# Patient Record
Sex: FEMALE | Race: WHITE | NOT HISPANIC OR LATINO | Employment: OTHER | URBAN - METROPOLITAN AREA
[De-identification: names, ages, dates, MRNs, and addresses within clinical notes are randomized per-mention and may not be internally consistent; named-entity substitution may affect disease eponyms.]

---

## 2021-07-29 RX ORDER — HYDROCHLOROTHIAZIDE 12.5 MG/1
12.5 CAPSULE ORAL DAILY
COMMUNITY
End: 2024-06-17 | Stop reason: HOSPADM

## 2021-07-29 RX ORDER — IBUPROFEN 200 MG
1 CAPSULE ORAL 2 TIMES DAILY
COMMUNITY
End: 2021-07-30 | Stop reason: ALTCHOICE

## 2021-07-29 RX ORDER — ATENOLOL 25 MG/1
12.5 TABLET ORAL DAILY
COMMUNITY
End: 2023-07-14 | Stop reason: ALTCHOICE

## 2021-07-29 RX ORDER — OMEGA-3-ACID ETHYL ESTERS 1 G/1
2 CAPSULE, LIQUID FILLED ORAL 2 TIMES DAILY
COMMUNITY
End: 2024-06-12 | Stop reason: ALTCHOICE

## 2021-07-29 RX ORDER — EZETIMIBE 10 MG/1
10 TABLET ORAL DAILY
COMMUNITY
End: 2023-07-14 | Stop reason: ALTCHOICE

## 2021-07-30 ENCOUNTER — OFFICE VISIT (OUTPATIENT)
Dept: CARDIOLOGY | Facility: CLINIC | Age: 61
End: 2021-07-30
Payer: OTHER GOVERNMENT

## 2021-07-30 ENCOUNTER — ANCILLARY PROCEDURE (OUTPATIENT)
Dept: CARDIOLOGY | Facility: CLINIC | Age: 61
End: 2021-07-30
Payer: OTHER GOVERNMENT

## 2021-07-30 VITALS
WEIGHT: 220 LBS | SYSTOLIC BLOOD PRESSURE: 140 MMHG | DIASTOLIC BLOOD PRESSURE: 86 MMHG | HEIGHT: 66 IN | BODY MASS INDEX: 35.36 KG/M2

## 2021-07-30 VITALS
DIASTOLIC BLOOD PRESSURE: 80 MMHG | WEIGHT: 223.5 LBS | HEIGHT: 66 IN | RESPIRATION RATE: 18 BRPM | HEART RATE: 46 BPM | OXYGEN SATURATION: 96 % | BODY MASS INDEX: 35.92 KG/M2 | SYSTOLIC BLOOD PRESSURE: 148 MMHG

## 2021-07-30 DIAGNOSIS — E78.00 PURE HYPERCHOLESTEROLEMIA: Primary | ICD-10-CM

## 2021-07-30 DIAGNOSIS — Q23.81 BICUSPID AORTIC VALVE: ICD-10-CM

## 2021-07-30 DIAGNOSIS — G47.33 OBSTRUCTIVE SLEEP APNEA SYNDROME: ICD-10-CM

## 2021-07-30 DIAGNOSIS — I10 ESSENTIAL HYPERTENSION: ICD-10-CM

## 2021-07-30 DIAGNOSIS — I35.0 NONRHEUMATIC AORTIC VALVE STENOSIS: ICD-10-CM

## 2021-07-30 LAB
AORTIC ARCH: 3.3 CM
ASCENDING AORTA: 3.96 CM
AV LVOT PEAK GRADIENT: 3.7 MMHG
AV MEAN GRADIENT: 10.25 MMHG
AV PEAK GRADIENT: 17.31 MMHG
BSA FOR ECHO PROCEDURE: 2.16 M2
DOP CALC AO PEAK VEL: 2.08 M/S
DOP CALC AO VTI: 51.3 CM
DOP CALC LVOT DIAMETER: 2.33 CM
DOP CALC LVOT STROKE VOLUME: 107 CM3
DOP CALC MV VTI: 37.54 CM
DOP CALCLVOT PEAK VEL VTI: 25.2 CM
E/A RATIO: 1.3
E/E' RATIO (AVERAGE): 14.2
E/E' RATIO: 16.7
EJECTION FRACTION: 63 %
ERAP: 5 MMHG
INTERVENTRICULAR SEPTUM: 1.1 CM (ref 0.6–1.1)
IVC PROX: 1.24 CM
LA AREA A4C SYSTOLE: 59.9 CM3
LEFT ATRIUM SIZE: 2.98 CM
LEFT ATRIUM VOLUME INDEX: 33 ML/M2
LEFT ATRIUM VOLUME: 68.7 CM3
LEFT INTERNAL DIMENSION IN SYSTOLE: 2.6 CM (ref 3.17–4.79)
LEFT VENTRICLE DIASTOLIC VOLUME: 110 CM3
LEFT VENTRICLE SYSTOLIC VOLUME: 41 CM3
LEFT VENTRICULAR INTERNAL DIMENSION IN DIASTOLE: 4.2 CM (ref 5.41–7.51)
LVAD-AP2: 33.9 CM2
MV DEC SLOPE: 3660 MM/S2
MV DT: 182 MS
MV MEAN GRADIENT: 1 MMHG
MV PEAK A VEL: 73 CM/S
MV PEAK E VEL: 94.3 CM/S
MV PEAK GRADIENT: 4 MMHG
MV STENOSIS PRESSURE HALF TIME: 76 MS
MV VALVE AREA P 1/2 METHOD: 2.89 CM2
MV VMAX: 96 CM/S
MVA (VTI): 2.86 CM2
POSTERIOR WALL: 1 CM (ref 0.6–1.1)
PULM VEIN S/D RATIO: 1.6
PV PEAK D VEL: 45 CM/S
PV PEAK GRADIENT: 2.56 MMHG
PV PEAK S VEL: 70 CM/S
PV VMAX: 0.8 M/S
RA AREA: 14.3 CM2
RH CV ECHO AV VALVE AREA VEL: 2 CM2
RH CV ECHO AV VALVE AREA VTI: 2.1 CM2
RH LVOT PEAK VELOCITY REST: 1 M/S
RIGHT VENTRICULAR INTERNAL DIMENSION IN DIASTOLE: 3.7 CM
RV AP4 BASE: 3 CM
S': 6.1 CM/S
SINUS: 2.9 CM
STJ: 3 CM
TDI: 5.6 CM/S
TDILATERAL: 8.1 CM/S
TR MAX PG: 16.97 MMHG
TRICUSPID ANNULAR PLANE SYSTOLIC EXCURSION: 2.6 CM
TRICUSPID VALVE PEAK REGURGITATION VELOCITY: 2.06 M/S
TV REST PULMONARY ARTERY PRESSURE: 22 MMHG
Z-SCORE OF LEFT VENTRICULAR DIMENSION IN END DIASTOLE: -4.16
Z-SCORE OF LEFT VENTRICULAR DIMENSION IN END SYSTOLE: -3.2

## 2021-07-30 PROCEDURE — 99204 OFFICE O/P NEW MOD 45 MIN: CPT | Mod: 25 | Performed by: INTERNAL MEDICINE

## 2021-07-30 PROCEDURE — 93306 TTE W/DOPPLER COMPLETE: CPT | Performed by: INTERNAL MEDICINE

## 2021-07-30 RX ORDER — CHOLECALCIFEROL (VITAMIN D3) 25 MCG
1000 TABLET ORAL DAILY
COMMUNITY
End: 2023-07-14 | Stop reason: ALTCHOICE

## 2021-07-30 ASSESSMENT — ENCOUNTER SYMPTOMS
SNORING: 0
LIGHT-HEADEDNESS: 0
IRREGULAR HEARTBEAT: 0
SHORTNESS OF BREATH: 0
DIZZINESS: 0
PALPITATIONS: 0

## 2021-07-30 ASSESSMENT — PAIN SCALES - GENERAL: PAINLEVEL: 0-NO PAIN

## 2021-07-30 NOTE — LETTER
[facility]  [address] [place] [address]  [phone]  Dept: [phone]  07/30/21      Payton Ayala DO  [address] [facility] [place] [address]        To: Payton Ayala DO      Thank you for referring your patient, Jamir Acevedo, to receive care in my office. I have enclosed a summary of the care provided to Jamir on 07/30/21.    Please contact me with any questions you may have regarding the visit.    Sincerely,         Pascual Pullima MD  [address] [place] [address]  [phone]    CC: No Recipients

## 2021-07-30 NOTE — PROGRESS NOTES
[facility]     CARDIOLOGY OUTPATIENT CONSULT NOTE       Chief Complaint   Patient presents with   • Hypertension   • Bicuspid Aortic Valve       HPI patient referred to me for bicuspid aortic valve.  Subjective    Patient ID: Jamir Acevedo is a 61 y.o. female.  Patient is a 61-year-old female with a history of bicuspid aortic valve and hypertension presents to me to establish cardiology care.  She said that she had a transesophageal echocardiogram 2 times and she was told she has a bicuspid aortic valve but nothing concerning.  Patient can do her normal activities without having any chest pain or shortness breath more than normal.  She denies any dizziness or passing out spells.  She denies any orthopnea or PND.  She denies any swelling in her lower extremities.  Patient do complains of tiredness and fatigue all day long.  As the day progresses her tiredness gets worse.  She does snore somewhat.  Patient has been regularly checking her blood pressure and is mostly less than 130/80 mmHg.    CARDIAC PROBLEM LIST:  No specialty comments available.     Past Medical History:   Diagnosis Date   • Bicuspid aortic valve    • Carpal tunnel syndrome    • Cervicalgia    • Chronic bronchitis (CMS/HCC) (HCC)    • History of palpitations    • HLD (hyperlipidemia)    • HTN (hypertension)    • Multiple thyroid nodules    • Nonalcoholic fatty liver disease      Past Surgical History:   Procedure Laterality Date   • BREAST BIOPSY Right 01/2020   • CARPAL TUNNEL RELEASE Bilateral    • COLONOSCOPY  02/2010   • HYSTERECTOMY     • TONSILLECTOMY     • UMBILICAL HERNIA REPAIR         Allergies as of 07/30/2021 - Reviewed 07/30/2021   Allergen Reaction Noted   • [place] Anaphylaxis 07/29/2021   • Neomy-polymyxinb-bacitracin-hc  07/29/2021     Current Outpatient Medications   Medication Sig Dispense Refill   • cholecalciferol, vitamin D3, (cholecalciferol) 25 mcg (1,000 unit) tablet Take 1,000 Units by  "mouth daily     • atenoloL (TENORMIN) 25 mg tablet Take 12.5 mg by mouth daily      • ezetimibe (ZETIA) 10 mg tablet Take 10 mg by mouth daily     • hydroCHLOROthiazide (MICROZIDE) 12.5 mg capsule Take 12.5 mg by mouth daily     • omega-3 acid ethyl esters (LOVAZA) 1 gram capsule Take 2 g by mouth 2 (two) times a day       No current facility-administered medications for this visit.       Family History   Problem Relation Age of Onset   • Stroke Mother    • Lung cancer Mother    • Heart disease Father    • Heart attack Father    • Heart disease Maternal Grandmother    • Cancer Maternal Grandfather    • Stroke Paternal Grandmother    • Heart attack Paternal Grandfather      Social History     Tobacco Use   • Smoking status: Never Smoker   • Smokeless tobacco: Never Used   Vaping Use   • Vaping Use: Never used   Substance Use Topics   • Alcohol use: Not Currently   • Drug use: Never       Review of Systems  Review of Systems   Cardiovascular: Positive for dyspnea on exertion. Negative for chest pain, irregular heartbeat, leg swelling/pain and palpitations.   Respiratory: Negative for shortness of breath, snoring and sleep apnea.    Neurological: Negative for dizziness and light-headedness.       Objective   Vitals:    07/30/21 1130   BP: 148/80   Pulse: 46   Resp: 18   SpO2: 96%   Height: 1.676 m (5' 6\")   Weight: 101.4 kg (223 lb 8 oz)   PainSc: 0-No pain   Patient Position: Sitting     Weight: 101.4 kg (223 lb 8 oz)    Physical Exam  General examination: Patient sitting comfortably in chair, not in any acute distress  HEENT: Atraumatic normocephalic, normal jugular venous distention  Cardiovascular: Apical impulse normally placed, S1-S2 normal, mild systolic murmur appreciated, no gallops, regular rate and rhythm  Respiratory: Bilateral breath sounds are equal and clear, no adventitious sounds heard, no wheezing and no crackles  Abdomen: No visible lumps seen, no visible pulsation, soft, nontender, bowel sounds " present, no organomegaly  Extremities: No pedal edema, no ulcers  Pulses: Radial pulses 2+ and equal in both upper extremities, dorsal pedis and posterior tibial 2+ and equal in both lower extremities, no carotid bruit  Skin: Warm and pink, no obvious lesions seen  Neurology: Patient awake alert and oriented, power equal in for all 4 extremities, no gross neurological deficit found    Data Review:   Sodium   Date Value Ref Range Status   06/23/2021 142 mmol/L Final     Potassium   Date Value Ref Range Status   06/23/2021 3.7 mmol/L Final     Chloride   Date Value Ref Range Status   06/23/2021 105 mmol/L Final     CO2   Date Value Ref Range Status   06/23/2021 28 mmol/L Final     BUN   Date Value Ref Range Status   06/23/2021 23 mg/dL Final     Creatinine   Date Value Ref Range Status   06/23/2021 1.00 mg/dL Final     Glucose   Date Value Ref Range Status   06/23/2021 96 mg/dL Final     Calcium   Date Value Ref Range Status   06/23/2021 10.6 mg/dL Final     No results found for: CKTOTAL, CKMB, CKMBINDEX, TROPONINI, BNP, POCBNP  Lipids:    Lab Results   Component Value Date    CHOL 222 05/18/2021     Lab Results   Component Value Date    HDL 46 05/18/2021     Lab Results   Component Value Date    LDLCALC 140 05/18/2021     Lab Results   Component Value Date    TRIG 104 05/18/2021        TSH: No results found for: TSH  Magnesium: No results found for: MG  Protime-INR: No results found for: PT, INR  A1c: No results found for: HGBA1C    Electrocardiogram: 7/30/2021  Sinus bradycardia with a heart rate of 44 bpm    Assessment/Plan   Diagnoses and all orders for this visit:    Pure hypercholesterolemia  -     US Carotid duplex bilateral; Future    Nonrheumatic aortic valve stenosis  -     Ambulatory referral to Cardiology    Bicuspid aortic valve  -     Ambulatory referral to Cardiology  -     ECG 12 lead -Normal, Today    Obstructive sleep apnea syndrome  -     Home sleep test; Future    Essential  hypertension      61-year-old female with a past medical history of bicuspid aortic valve, hypertension and hyperlipidemia referred to me to establish cardiology care.  He has a known history of bicuspid aortic valve.  I reviewed her echocardiogram from today and she does have a bicuspid valve with mild aortic stenosis.  Trace aortic regurgitation.  Grade 2 diastolic dysfunction.  Recommend the patient all first-degree relatives should be checked for bicuspid aortic valve.  Patient has no anginal symptoms.  No symptoms or signs of heart failure.  No syncopal events.  Her blood pressure based on what she told me is well controlled I recommend continue hydrochlorothiazide.  Patient heart rate is low and I would like to stop atenolol however patient stated that she used to have a lot of palpitations in the past and atenolol is helping her.  I will decrease atenolol to 12.5 mg daily.  Recommend keep checking the blood pressure and pulse.  Patient do have some good symptoms of obstructive sleep apnea and I recommend getting a home sleep study.  Patient cholesterol levels are elevated and she did try a statin in the past.  She states that she gained a lot of weight because of that and also she developed nonalcoholic steatohepatitis because of that she did not take anymore statin.  She is currently on Zetia and her cholesterol numbers has been better.  Recommend eating healthy diet mostly vegetables and continue Zetia patient to get a lot of diarrhea because of vegetables..  Because of risk factors we will get a carotid artery Doppler.    Follow-up in 6 months.      Sincerely,      Electronically signed by: Pascual Pulliam MD  7/30/2021  12:28 PM

## 2021-08-17 ENCOUNTER — OFFICE VISIT (OUTPATIENT)
Dept: URGENT CARE | Facility: CLINIC | Age: 61
End: 2021-08-17
Payer: OTHER GOVERNMENT

## 2021-08-17 ENCOUNTER — HOSPITAL ENCOUNTER (OUTPATIENT)
Dept: RADIOLOGY | Facility: HOSPITAL | Age: 61
Discharge: HOME | End: 2021-08-17
Payer: OTHER GOVERNMENT

## 2021-08-17 VITALS
OXYGEN SATURATION: 96 % | TEMPERATURE: 98 F | WEIGHT: 223 LBS | BODY MASS INDEX: 35.99 KG/M2 | SYSTOLIC BLOOD PRESSURE: 142 MMHG | DIASTOLIC BLOOD PRESSURE: 80 MMHG | HEART RATE: 60 BPM

## 2021-08-17 DIAGNOSIS — M79.671 FOOT PAIN, RIGHT: Primary | ICD-10-CM

## 2021-08-17 PROCEDURE — 73630 X-RAY EXAM OF FOOT: CPT | Mod: RT

## 2021-08-17 PROCEDURE — 99212 OFFICE O/P EST SF 10 MIN: CPT | Performed by: PHYSICIAN ASSISTANT

## 2021-08-17 RX ORDER — CALCIUM CARB/VITAMIN D3/VIT K1 500-500-40
1 TABLET,CHEWABLE ORAL DAILY
COMMUNITY
Start: 2020-12-31 | End: 2021-08-17 | Stop reason: ALTCHOICE

## 2021-08-17 NOTE — PROGRESS NOTES
Subjective      Jamir Acevedo is a 61 y.o. female who presents for right foot pain.    Jamir is here today with concern for right-sided toe pain. She states that ~2 weeks ago, she ran her little toe into a recliner. She states it was the tip of her toe; it ran straight in. She states that she yomaira-taped her toes for a few days. She had bruising at the base of her toe. She states that she continues to have swelling; she also has pain in her foot at the base of her toe. She has good sensation in the foot. She wonders if she has a fracture in her foot.          The following have been reviewed and updated as appropriate in this visit:         Review of Systems    Objective   /80 (BP Location: Right arm, Patient Position: Sitting, Cuff Size: Long Adult)   Pulse 60   Temp 36.7 °C (98 °F) (Temporal)   Wt 101.2 kg (223 lb)   SpO2 96%   BMI 35.99 kg/m²     Physical Exam  Vitals and nursing note reviewed.   Constitutional:       Appearance: She is well-developed.   HENT:      Head: Normocephalic and atraumatic.   Eyes:      General:         Right eye: No discharge.         Left eye: No discharge.   Pulmonary:      Effort: Pulmonary effort is normal.   Musculoskeletal:      Right foot: Normal range of motion and normal capillary refill. Swelling (at 5th MTP, proximal 5th toe) and bony tenderness present. No deformity. Normal pulse.      Left foot: Normal range of motion. No swelling or deformity.   Neurological:      Mental Status: She is alert and oriented to person, place, and time.   Psychiatric:         Speech: Speech normal.         Behavior: Behavior normal.         Thought Content: Thought content normal.         Judgment: Judgment normal.         Assessment/Plan   Diagnoses and all orders for this visit:    Foot pain, right  -     X-ray foot 3 or more views right    Jamir stubbed her right 5th toe two weeks ago with residual swelling and tenderness at her R 5th MTP joint. Her x-ray today does not show  fracture. I offered a boot and she was comfortable continuing to watch at home. I advised elevation and ice with rest for a few days and asked her to follow up if she doesn't improve; we would consider a boot and podiatry referral. She was comfortable with this plan and will follow up as needed with further questions/concerns.    ESTEBAN Huertas

## 2021-09-08 ENCOUNTER — ANCILLARY PROCEDURE (OUTPATIENT)
Dept: CARDIOLOGY | Facility: CLINIC | Age: 61
End: 2021-09-08
Payer: OTHER GOVERNMENT

## 2021-09-08 DIAGNOSIS — E78.00 PURE HYPERCHOLESTEROLEMIA: ICD-10-CM

## 2021-09-08 PROCEDURE — 93880 EXTRACRANIAL BILAT STUDY: CPT | Performed by: INTERNAL MEDICINE

## 2021-09-09 LAB
LEFT CCA DIST DIAS: 24 CM/S
LEFT CCA DIST SYS: 75 CM/S
LEFT CCA MID DIAS: 31 CM/S
LEFT CCA MID SYS: 80 CM/S
LEFT CCA PROX DIAS: 30 CM/S
LEFT CCA PROX SYS: 99 CM/S
LEFT ECA SYS: 88 CM/S
LEFT ICA DIST DIAS: 32 CM/S
LEFT ICA DIST SYS: 87 CM/S
LEFT ICA MID DIAS: 37 CM/S
LEFT ICA MID SYS: 87 CM/S
LEFT ICA PROX DIAS: 25 CM/S
LEFT ICA PROX SYS: 71 CM/S
LEFT ICA/CCA SYS: 1.2
LEFT VERTEBRAL DIAS: 16 CM/S
LEFT VERTEBRAL SYS: 55 CM/S
RH LEFT CAROTID BULB EDV: 25 CM/S
RH LEFT CAROTID BULB PSV: 58 CM/S
RH RIGHT CAROTID BULB EDV: 17
RH RIGHT CAROTID BULB PSV: 48
RIGHT CCA DIST DIAS: 27 CM/S
RIGHT CCA DIST SYS: 74 CM/S
RIGHT CCA MID DIAS: 18 CM/S
RIGHT CCA MID SYS: 69 CM/S
RIGHT CCA PROX DIAS: 27 CM/S
RIGHT CCA PROX SYS: 67 CM/S
RIGHT ECA SYS: 87 CM/S
RIGHT ICA DIST DIAS: 30 CM/S
RIGHT ICA DIST SYS: 95 CM/S
RIGHT ICA MID DIAS: 30 CM/S
RIGHT ICA MID SYS: 71 CM/S
RIGHT ICA PROX DIAS: 27 CM/S
RIGHT ICA PROX SYS: 60 CM/S
RIGHT SUBCLAVIAN SYS: 1.3
RIGHT VERTEBRAL DIAS: 19 CM/S
RIGHT VERTEBRAL SYS: 51 CM/S

## 2021-10-14 ENCOUNTER — ANCILLARY PROCEDURE (OUTPATIENT)
Dept: SLEEP MEDICINE | Facility: HOSPITAL | Age: 61
End: 2021-10-14
Payer: OTHER GOVERNMENT

## 2021-10-14 VITALS — HEIGHT: 66 IN | BODY MASS INDEX: 35.84 KG/M2 | WEIGHT: 223 LBS

## 2021-10-14 PROCEDURE — 95806 SLEEP STUDY UNATT&RESP EFFT: CPT | Mod: 26 | Performed by: PSYCHIATRY & NEUROLOGY

## 2021-10-14 PROCEDURE — 95806 SLEEP STUDY UNATT&RESP EFFT: CPT

## 2021-10-14 NOTE — PATIENT INSTRUCTIONS
Patient is here with her  to  HST equipment. Patient watched Sleep Disorders video. With assistance patient was instructed on how to apply HST equipment by self-application. Application instructions, patient morning questionnaire, and activity log sent with patient. Patient instructed to call the Sleep Center with any concerns during the study. Patient will do the study tonight and will return equipment tomorrow.    AUREA Recio 10/14/21

## 2021-10-21 DIAGNOSIS — R06.83 SNORING: Primary | ICD-10-CM

## 2021-10-25 ENCOUNTER — TRANSCRIBE ORDERS (OUTPATIENT)
Dept: CARDIOLOGY | Facility: CLINIC | Age: 61
End: 2021-10-25

## 2021-10-25 ENCOUNTER — TELEPHONE (OUTPATIENT)
Dept: SLEEP MEDICINE | Facility: HOSPITAL | Age: 61
End: 2021-10-25

## 2021-10-25 DIAGNOSIS — G47.33 OBSTRUCTIVE SLEEP APNEA SYNDROME: Primary | ICD-10-CM

## 2022-03-24 ENCOUNTER — OFFICE VISIT (OUTPATIENT)
Dept: URGENT CARE | Facility: CLINIC | Age: 62
End: 2022-03-24
Payer: OTHER GOVERNMENT

## 2022-03-24 ENCOUNTER — HOSPITAL ENCOUNTER (OUTPATIENT)
Dept: RADIOLOGY | Facility: HOSPITAL | Age: 62
Discharge: HOME | End: 2022-03-24
Payer: OTHER GOVERNMENT

## 2022-03-24 VITALS
OXYGEN SATURATION: 96 % | HEART RATE: 71 BPM | TEMPERATURE: 98.5 F | DIASTOLIC BLOOD PRESSURE: 88 MMHG | RESPIRATION RATE: 16 BRPM | SYSTOLIC BLOOD PRESSURE: 144 MMHG

## 2022-03-24 DIAGNOSIS — M79.672 LEFT FOOT PAIN: ICD-10-CM

## 2022-03-24 DIAGNOSIS — S99.922A FOOT TRAUMA, LEFT, INITIAL ENCOUNTER: Primary | ICD-10-CM

## 2022-03-24 PROCEDURE — 73630 X-RAY EXAM OF FOOT: CPT | Mod: LT

## 2022-03-24 PROCEDURE — 99212 OFFICE O/P EST SF 10 MIN: CPT | Performed by: PHYSICIAN ASSISTANT

## 2022-03-24 NOTE — PROGRESS NOTES
"Subjective      Jamir Acevedo is a 61 y.o. female who presents for left foot pain.    Jamir states that she tripped and jammed her left two ~1 hour ago. She states that she caught herself and then her head bounced on the side of the care. She doesn't desire evaluation for her head today. She denies headache, dizziness, or confusion.           The following have been reviewed and updated as appropriate in this visit:          Review of Systems    Objective   /88 (BP Location: Left arm, Patient Position: Sitting, Cuff Size: Large Long Adult)   Pulse 71   Temp 36.9 °C (98.5 °F) (Temporal)   Resp 16   SpO2 96%     Physical Exam  Vitals and nursing note reviewed.   Constitutional:       Appearance: She is well-developed.   HENT:      Head: Normocephalic and atraumatic.   Eyes:      General:         Right eye: No discharge.         Left eye: No discharge.   Pulmonary:      Effort: Pulmonary effort is normal.   Musculoskeletal:      Left foot: Normal range of motion and normal capillary refill. Bony tenderness present. No swelling.        Feet:    Feet:      Left foot:      Skin integrity: No ulcer or dry skin.      Toenail Condition: Left toenails are normal.   Neurological:      Mental Status: She is alert and oriented to person, place, and time.   Psychiatric:         Speech: Speech normal.         Behavior: Behavior normal.         Thought Content: Thought content normal.         Judgment: Judgment normal.         Assessment/Plan   Diagnoses and all orders for this visit:    Foot trauma, left, initial encounter  -     X-ray foot 3 or more views left    Left foot pain    Jamir stubbed her left toe today in her driveway. She has some mild irritation at the tip of her toe and tenderness through the MTP joint. X-ray is reassuring and shows \"mild degenerative changes without fracture.\" I advised rest, ice, tylenol, and Voltaren gel for symptomatic relief and follow-up if she fails to start improving in the next " week. Jamir was comfortable with this plan.    ESTEBAN Huertas

## 2022-05-29 ENCOUNTER — OFFICE VISIT (OUTPATIENT)
Age: 62
End: 2022-05-29

## 2022-05-29 VITALS
OXYGEN SATURATION: 97 % | HEART RATE: 64 BPM | BODY MASS INDEX: 36.64 KG/M2 | DIASTOLIC BLOOD PRESSURE: 94 MMHG | WEIGHT: 228 LBS | TEMPERATURE: 98.7 F | SYSTOLIC BLOOD PRESSURE: 138 MMHG | HEIGHT: 66 IN | RESPIRATION RATE: 14 BRPM

## 2022-05-29 DIAGNOSIS — R21 TARGET RASH: ICD-10-CM

## 2022-05-29 DIAGNOSIS — S80.862A TICK BITE OF LEFT LOWER LEG, INITIAL ENCOUNTER: Primary | ICD-10-CM
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
## 2022-05-29 RX ORDER — HYDROCHLOROTHIAZIDE 12.5 MG/1
12.5 CAPSULE, GELATIN COATED ORAL DAILY
COMMUNITY

## 2022-05-29 RX ORDER — DOXYCYCLINE 100 MG/1
100 TABLET ORAL 2 TIMES DAILY
Qty: 20 TABLET | Refills: 0 | Status: SHIPPED | OUTPATIENT
Start: 2022-05-29 | End: 2022-06-08

## 2022-05-29 NOTE — PROGRESS NOTES
[address]  [address] [facility] [address] [address]  Dept: [phone]  Dept Fax: [phone]  Loc: [phone]    Yarelis Ramon is a 60 y.o. female who presents today for her medical conditions/complaintsas noted below. Yarelis Ramon is c/o of Insect Bite (Tick bite from 3 weeks ago, a red [place] has formed on left lower leg, appeared today.  )        HPI:     HPI  Ermma presents today with red [place] rash to lower left leg that popped up today. In the same area 3 weeks ago she had a tick bite. She removed the tick and believes she removed all of it. The area is kind of itchy. Only slightly tender when touched. She has had no fever and no other areas of rash or other type of rash. Maybe a slight headache. She is from out of town. Past Medical History:   Diagnosis Date    Chronic bronchitis ([address]      Past Surgical History:   Procedure Laterality Date    HERNIA REPAIR      PARTIAL HYSTERECTOMY         No family history on file. Social History     Tobacco Use    Smoking status: Never Smoker    Smokeless tobacco: Never Used   Substance Use Topics    Alcohol use: Yes     Comment: occ      Current Outpatient Medications   Medication Sig Dispense Refill    hydroCHLOROthiazide (MICROZIDE) 12.5 MG capsule Take 12.5 mg by mouth daily      doxycycline monohydrate (ADOXA) 100 MG tablet Take 1 tablet by mouth 2 times daily for 10 days 20 tablet 0     No current facility-administered medications for this visit.      Allergies   Allergen Reactions    Bacitracin-Neomycin-Polymyxin Swelling     Lips swell        Health Maintenance   Topic Date Due    COVID-19 Vaccine (1) Never done    Depression Screen  Never done    HIV screen  Never done    Hepatitis C screen  Never done    Cervical cancer screen  Never done    Lipids  Never done    Colorectal Cancer Screen  Never done    Breast cancer screen  Never done    Shingles vaccine (1 of 2) Never done    Flu vaccine (Season Ended) 09/01/2022    DTaP/Tdap/Td vaccine (3 - Td or Tdap) 12/23/2029    Hepatitis A vaccine  Aged Out    Hepatitis B vaccine  Aged Out    Hib vaccine  Aged Out    Meningococcal (ACWY) vaccine  Aged Out    Pneumococcal 0-64 years Vaccine  Aged Out       Subjective:     Review of Systems   Constitutional: Negative for chills and fever. Skin: Positive for rash (circular \"target like\" rash lower left leg). Neurological: Positive for headaches. All other systems reviewed and are negative.      :Objective      Physical Exam  Vitals and nursing note reviewed. Constitutional:       General: She is not in acute distress. Appearance: Normal appearance. She is well-developed. She is not ill-appearing or diaphoretic. HENT:      Head: Normocephalic and atraumatic. Eyes:      Conjunctiva/sclera: Conjunctivae normal.      Pupils: Pupils are equal, round, and reactive to light. Pulmonary:      Effort: Pulmonary effort is normal. No respiratory distress. Musculoskeletal:      Cervical back: Normal range of motion. Skin:     General: Skin is warm and dry. Findings: Rash present. Neurological:      Mental Status: She is alert and oriented to person, place, and time. Psychiatric:         Mood and Affect: Mood normal.         Behavior: Behavior normal.       BP (!) 138/94   Pulse 64   Temp 98.7 °F (37.1 °C)   Resp 14   Ht 5' 6\" (1.676 m)   Wt 228 lb (103.4 kg)   SpO2 97%   BMI 36.80 kg/m²     :Assessment       Diagnosis Orders   1. Tick bite of left lower leg, initial encounter  B. Burgdorferi Ab, IgG by Western Blot    B. burgdorferi Ab, IgM by Western Blot    doxycycline monohydrate (ADOXA) 100 MG tablet   2. Target rash  B. Burgdorferi Ab, IgG by Western Blot    B. burgdorferi Ab, IgM by Western Blot       :Plan    Suspect cellulitis or skin infection cause of area of concern. Due to tick bite will test for lyme and start pt on doxy.  Pt agreeable with plan and voiced understanding. 1. Doxycyline as prescribed - be cautious and do not be in sunlight for prolonged periods of time. Cover up and wear sunscreen. 2. Blood work for lyme disease - we will call you with results   3. If patient is not improving or developing any new/worsening symptoms then return to clinic as needed or go to ER. Patient is to follow up with PCP as needed. Orders Placed This Encounter   Procedures    B. Burgdorferi Ab, IgG by Western Blot    B. burgdorferi Ab, IgM by Western Blot       No follow-ups on file. Orders Placed This Encounter   Medications    doxycycline monohydrate (ADOXA) 100 MG tablet     Sig: Take 1 tablet by mouth 2 times daily for 10 days     Dispense:  20 tablet     Refill:  0       Patient given educational materials- see patient instructions. Discussed use, benefit, and side effects of prescribedmedications. All patient questions answered. Pt voiced understanding. Patient Instructions       Patient Education        Tick Bite: Care Instructions  Overview     Ticks are small spiderlike animals. They bite to fasten themselves onto yourskin and feed on your blood. Ticks can carry diseases. But most ticks do not carry diseases, and most tickbites do not cause serious health problems. Some people may have an allergic reaction to a tick bite. This reaction may be mild, with symptoms like itching and swelling. In rare cases, a severe allergicreaction may occur. Most of the time, all you need to do for a tick bite is relieve any symptomsyou may have. Follow-up care is a key part of your treatment and safety. Be sure to make and go to all appointments, and call your doctor if you are having problems. It's also a good idea to know your test results and keep alist of the medicines you take. How can you care for yourself at home?  Put ice or a cold pack on the bite for 15 to 20 minutes once an hour. Put a thin cloth between the ice and your skin.    Try an over-the-counter medicine to relieve itching, redness, swelling, and pain. Be safe with medicines. Read and follow all instructions on the label. ? Take an antihistamine medicine to help relieve itching, redness, and swelling. ? Use a spray of local anesthetic that contains benzocaine, such as Solarcaine. It may help relieve pain. If your skin reacts to the spray, stop using it. ? Put calamine lotion on the skin. It may help relieve itching. To avoid tick bites   Avoid ticks:  ? Learn where ticks are found in your community, and stay away from those areas if possible. ? Cover as much of your body as possible when you work or play in grassy or wooded areas. ? Use insect repellents, such as products containing DEET. You can spray them on your skin. ? Take steps to control ticks on your property if you live in an area where Lyme disease occurs. Clear leaves, brush, tall grasses, woodpiles, and stone fences from around your house and the edges of your yard or garden. This may help get rid of ticks.  When you come in from outdoors, check your body for ticks, including your groin, head, and underarms. The ticks may be about the size of a sesame seed. If no one else can help you check for ticks on your scalp, comb your hair with a fine-tooth comb.  If you find a tick, remove it quickly. Use tweezers to grasp the tick as close to its mouth (the part in your skin) as possible. Slowly pull the tick straight outdo not twist or yankuntil its mouth releases from your skin. If part of the tick stays in the skin, leave it alone. It will likely come out on its own in a few days.  Ticks can come into your house on clothing, outdoor gear, and pets. These ticks can fall off and attach to you. ? Check your clothing and outdoor gear. Remove any ticks you find. Then put your clothing in a clothes dryer on high heat for about 4 minutes to kill any ticks that might remain. ?  Check your pets for ticks after they have been outdoors. When should you call for help? Call 911 anytime you think you may need emergency care. For example, call if:     You have symptoms of a severe allergic reaction. These may include:  ? Sudden raised, red areas (hives) all over your body. ? Swelling of the throat, mouth, lips, or tongue. ? Trouble breathing. ? Passing out (losing consciousness). Or you may feel very lightheaded or suddenly feel weak, confused, or restless. Call your doctor now or seek immediate medical care if:     You have signs of infection, such as:  ? Increased pain, swelling, warmth, or redness around the bite. ? Red streaks leading from the bite. ? Pus draining from the bite. ? A fever. Watch closely for changes in your health, and be sure to contact your doctor if:     You develop a new rash.      You have joint pain.      You are very tired.      You have flu-like symptoms.      You have symptoms for more than 1 week. Where can you learn more? Go to [email or web address]://[email or web address] org and sign in to your [employer] account. Enter D544 in the Transfer To box to learn more about \"Tick Bite: Care Instructions. \"     If you do not have an account, please click on the \"Sign Up Now\" link. Current as of: July 1, 2021               Content Version: 13.2  © 1201-4372 [employer]. Care instructions adapted under license by [facility] ([facility]). If you have questions about a medical condition or this instruction, always ask your healthcare professional. [place] [address] any warranty or liability for your use of this information. 1. Doxycyline as prescribed - be cautious and do not be in sunlight for prolonged periods of time. Cover up and wear sunscreen. 2. Blood work for lyme disease - we will call you with results   3. If patient is not improving or developing any new/worsening symptoms then return to clinic as needed or go to ER.   Patient is to follow up with PCP as needed.            Electronically signed by SHIRA Whitman on 5/29/2022 at 3:12 PM

## 2022-05-29 NOTE — PATIENT INSTRUCTIONS
Patient Education        Tick Bite: Care Instructions  Overview     Ticks are small spiderlike animals. They bite to fasten themselves onto yourskin and feed on your blood. Ticks can carry diseases. But most ticks do not carry diseases, and most tickbites do not cause serious health problems. Some people may have an allergic reaction to a tick bite. This reaction may be mild, with symptoms like itching and swelling. In rare cases, a severe allergicreaction may occur. Most of the time, all you need to do for a tick bite is relieve any symptomsyou may have. Follow-up care is a key part of your treatment and safety. Be sure to make and go to all appointments, and call your doctor if you are having problems. It's also a good idea to know your test results and keep alist of the medicines you take. How can you care for yourself at home?  Put ice or a cold pack on the bite for 15 to 20 minutes once an hour. Put a thin cloth between the ice and your skin.  Try an over-the-counter medicine to relieve itching, redness, swelling, and pain. Be safe with medicines. Read and follow all instructions on the label. ? Take an antihistamine medicine to help relieve itching, redness, and swelling. ? Use a spray of local anesthetic that contains benzocaine, such as Solarcaine. It may help relieve pain. If your skin reacts to the spray, stop using it. ? Put calamine lotion on the skin. It may help relieve itching. To avoid tick bites   Avoid ticks:  ? Learn where ticks are found in your community, and stay away from those areas if possible. ? Cover as much of your body as possible when you work or play in grassy or wooded areas. ? Use insect repellents, such as products containing DEET. You can spray them on your skin. ? Take steps to control ticks on your property if you live in an area where Lyme disease occurs.  Clear leaves, brush, tall grasses, woodpiles, and stone fences from around your house and the edges of your yard or garden. This may help get rid of ticks.  When you come in from outdoors, check your body for ticks, including your groin, head, and underarms. The ticks may be about the size of a sesame seed. If no one else can help you check for ticks on your scalp, comb your hair with a fine-tooth comb.  If you find a tick, remove it quickly. Use tweezers to grasp the tick as close to its mouth (the part in your skin) as possible. Slowly pull the tick straight outdo not twist or yankuntil its mouth releases from your skin. If part of the tick stays in the skin, leave it alone. It will likely come out on its own in a few days.  Ticks can come into your house on clothing, outdoor gear, and pets. These ticks can fall off and attach to you. ? Check your clothing and outdoor gear. Remove any ticks you find. Then put your clothing in a clothes dryer on high heat for about 4 minutes to kill any ticks that might remain. ? Check your pets for ticks after they have been outdoors. When should you call for help? Call 911 anytime you think you may need emergency care. For example, call if:     You have symptoms of a severe allergic reaction. These may include:  ? Sudden raised, red areas (hives) all over your body. ? Swelling of the throat, mouth, lips, or tongue. ? Trouble breathing. ? Passing out (losing consciousness). Or you may feel very lightheaded or suddenly feel weak, confused, or restless. Call your doctor now or seek immediate medical care if:     You have signs of infection, such as:  ? Increased pain, swelling, warmth, or redness around the bite. ? Red streaks leading from the bite. ? Pus draining from the bite. ? A fever. Watch closely for changes in your health, and be sure to contact your doctor if:     You develop a new rash.      You have joint pain.      You are very tired.      You have flu-like symptoms.      You have symptoms for more than 1 week. Where can you learn more?   Go to [email or web address] and sign in to your [employer] account. Enter V027 in the [facility] box to learn more about \"Tick Bite: Care Instructions. \"     If you do not have an account, please click on the \"Sign Up Now\" link. Current as of: July 1, 2021               Content Version: 13.2  © 2911-0078 Healthwise, [employer] Care instructions adapted under license by [facility] ([facility]). If you have questions about a medical condition or this instruction, always ask your healthcare professional. [place] [address] any warranty or liability for your use of this information. 1. Doxycyline as prescribed - be cautious and do not be in sunlight for prolonged periods of time. Cover up and wear sunscreen. 2. Blood work for lyme disease - we will call you with results   3. If patient is not improving or developing any new/worsening symptoms then return to clinic as needed or go to ER. Patient is to follow up with PCP as needed.

## 2022-05-31 ENCOUNTER — TELEPHONE (OUTPATIENT)
Age: 62
End: 2022-05-31

## 2022-05-31 LAB
LYME (B. BURGDORFERI) AB IGG WB: NEGATIVE
LYME AB IGM BY WB:: NEGATIVE

## 2022-06-01 NOTE — TELEPHONE ENCOUNTER
Spoke with patient and informed her to continue to take the doxycycline.   Patient verbalized understanding

## 2022-06-01 NOTE — TELEPHONE ENCOUNTER
Patient was called with test results and asked if she needed to continue to take the meds given during visit?  Please advise

## 2023-07-10 PROCEDURE — 93306 TTE W/DOPPLER COMPLETE: CPT | Mod: 26 | Performed by: INTERNAL MEDICINE

## 2023-07-12 ASSESSMENT — ENCOUNTER SYMPTOMS
UNUSUAL HAIR DISTRIBUTION: 0
LOSS OF BALANCE: 1
WEIGHT LOSS: 0
DOUBLE VISION: 0
WEIGHT GAIN: 0
PERSISTENT INFECTIONS: 0
SYNCOPE: 0
ODYNOPHAGIA: 0
VISUAL HALOS: 0
NEAR-SYNCOPE: 0
DECREASED APPETITE: 0
SEIZURES: 0
NUMBNESS: 0
DIARRHEA: 0
HEMATOCHEZIA: 0
SNORING: 0
TREMORS: 1
DYSURIA: 0
LIGHT-HEADEDNESS: 1
NAUSEA: 0
SLEEP DISTURBANCES DUE TO BREATHING: 1
SUSPICIOUS LESIONS: 0
BACK PAIN: 1
SENSORY CHANGE: 0
SPUTUM PRODUCTION: 1
HEADACHES: 1
COLOR CHANGE: 0
DEPRESSION: 0
BLACKOUTS: 0
FOCAL WEAKNESS: 0
ALTERED MENTAL STATUS: 0
HEMATURIA: 0
PALPITATIONS: 0
SHORTNESS OF BREATH: 1
TROUBLE SWALLOWING: 0
JOINT SWELLING: 0
IRREGULAR HEARTBEAT: 1
NERVOUS/ANXIOUS: 0
POLYDIPSIA: 1
DIAPHORESIS: 0
MYALGIAS: 0
DECREASED LIBIDO: 1
HEMOPTYSIS: 0
SORE THROAT: 0
PND: 0
FALLS: 0
ORTHOPNEA: 0
MEMORY LOSS: 0
FEVER: 0
VOMITING: 0
POOR WOUND HEALING: 0
VISUAL CHANGE: 0
DIZZINESS: 1
WEAKNESS: 0
NIGHT SWEATS: 0
HALLUCINATIONS: 0
BRUISES/BLEEDS EASILY: 1
CHILLS: 0
CLAUDICATION: 0

## 2023-07-12 NOTE — PROGRESS NOTES
Cardiology Outpatient Follow-up Note    Subjective    Patient ID: Jamir Acevedo is a 63 y.o. female.    Chief Complaint   Patient presents with    Hypertension    bicuspid aortic valve    Hyperlipidemia        HPI  Patient is a very pleasant 63-year-old female.  She is followed by Dr. Pulliam.  She has a history of bicuspid aortic valve, palpitations, hyperlipidemia, hypertension, as well as EMERY.    She had a echocardiogram completed on 7/7/2023.  This did demonstrate a moderately dilated ascending aorta at 4.7 cm.  Mild aortic valve stenosis and bicuspid aortic valve with trace regurgitation.  Grade 1 diastolic dysfunction with normal LVEF.  Previous measurements of ascending aorta at 3.96 in 2021.  She also had a CT completed at the same time on 7/7/2023 at [facility] for coronary calcium score.  This also demonstrated an thoracic aneurysm of approximately 4.2 cm.  Total calcium score 142, 122 of the LAD.    Reviewed blood pressure logs.  Most of her blood pressures are greater than 130, less than 150 systolic.  She started red yeast rice on Monday for her cholesterol.  She had labs on 6/27/2023.  LDL was 184.  Which was increased from prior.  She is going to try dietary changes, exercising, as well as the red yeast rice prior to going on cholesterol-lowering agents.  She does have a history of EMERY so therefore statin medication contraindicated.    Cardiology Problem List:      No specialty comments available.    Past Medical History:   Diagnosis Date    Bicuspid aortic valve     Carpal tunnel syndrome     Cervicalgia     Chronic bronchitis (CMS/HCC)     History of palpitations     HLD (hyperlipidemia)     HTN (hypertension)     Multiple thyroid nodules     Nonalcoholic fatty liver disease        Past Surgical History:   Procedure Laterality Date    BREAST BIOPSY Right 01/2020    CARPAL TUNNEL RELEASE Bilateral     COLONOSCOPY  02/2010    HYSTERECTOMY      TONSILLECTOMY      UMBILICAL HERNIA REPAIR          Allergies as of 07/14/2023 - Reviewed 07/14/2023   Allergen Reaction Noted    [place] Anaphylaxis 07/29/2021    Neomy-polymyxinb-bacitracin-hc  07/29/2021       Current Outpatient Medications   Medication Sig Dispense Refill    RED YEAST RICE ORAL Take 1 tablet by mouth 2 (two) times a day      hydroCHLOROthiazide (MICROZIDE) 12.5 mg capsule Take 1 capsule (12.5 mg total) by mouth daily      omega-3 acid ethyl esters (LOVAZA) 1 gram capsule Take 2 capsules (2 g total) by mouth 2 (two) times a day       No current facility-administered medications for this visit.       Family History   Problem Relation Age of Onset    Stroke Mother     Lung cancer Mother     Heart disease Father     Heart attack Father     Heart disease Maternal Grandmother     Cancer Maternal Grandfather     Stroke Paternal Grandmother     Heart attack Paternal Grandfather        Social History     Tobacco Use    Smoking status: Never    Smokeless tobacco: Never   Vaping Use    Vaping Use: Never used   Substance Use Topics    Alcohol use: Not Currently    Drug use: Never       The following have been reviewed and updated as appropriate in this visit  Tobacco  Allergies  Meds  Problems  Med Hx  Surg Hx  Fam Hx      .    Review of Systems   Constitutional: Negative for chills, decreased appetite, diaphoresis, fever, malaise/fatigue, night sweats, weight gain and weight loss.   HENT:  Negative for congestion, hearing loss, nosebleeds, odynophagia and sore throat.    Eyes:  Negative for double vision and visual halos.   Cardiovascular:  Positive for irregular heartbeat. Negative for chest pain, claudication, cyanosis, leg swelling, near-syncope, orthopnea, palpitations, paroxysmal nocturnal dyspnea and syncope.   Respiratory:  Positive for shortness of breath, sleep disturbances due to breathing and sputum production. Negative for hemoptysis and snoring.    Endocrine: Positive for polydipsia. Negative for polyuria.   Hematologic/Lymphatic:  Bruises/bleeds easily.   Skin:  Negative for color change, poor wound healing, rash, suspicious lesions and unusual hair distribution.   Musculoskeletal:  Positive for back pain. Negative for falls, joint pain, joint swelling, muscle weakness and myalgias.   Gastrointestinal:  Negative for diarrhea, hematochezia, melena, nausea and vomiting.   Genitourinary:  Positive for decreased libido and nocturia. Negative for dysuria, hematuria, incomplete emptying and urgency.   Neurological:  Positive for dizziness, headaches, light-headedness, loss of balance and tremors. Negative for focal weakness, numbness, seizures, sensory change and weakness.   Psychiatric/Behavioral:  Negative for altered mental status, depression, hallucinations and memory loss. The patient is not nervous/anxious.    Allergic/Immunologic: Positive for environmental allergies. Negative for HIV exposure and persistent infections.       Objective     Vitals:    07/14/23 1145   BP: 120/78   Pulse: 83   SpO2: 95%     Weight:   Weights (Current Encounter Only) (last 180 days)       Date/Time Weight    07/14/23 1145 102.5 kg (226 lb)             Physical Exam    Constitutional: Well built, nourished, no acute distress.   HEENT: Head is normocephalic and atraumatic. Sclera anicteric.   Neck: Supple. No carotid bruits.  No JVD.  Lungs: Effort normal. Breath sounds are clear without wheezes or rales. No respiratory distress.   Heart: S1-S2, Regular rate and rhythm without murmur.   Extremities: Without edema. Radial pulses 2+.  Musculoskeletal: Normal ROM.    Neurologic: No focal or gross deficits.  Psychiartic: cooperative, alert and orientated X 3.  Skin: warm, dry, intact.      Data Review:     Sodium   Date Value Ref Range Status   06/23/2021 142 mmol/L Final     Potassium   Date Value Ref Range Status   06/23/2021 3.7 mmol/L Final     Chloride   Date Value Ref Range Status   06/23/2021 105 mmol/L Final     CO2   Date Value Ref Range Status   06/23/2021  28 mmol/L Final     BUN   Date Value Ref Range Status   06/23/2021 23 mg/dL Final     Creatinine   Date Value Ref Range Status   06/23/2021 1.00 mg/dL Final     Glucose   Date Value Ref Range Status   06/23/2021 96 mg/dL Final     Calcium   Date Value Ref Range Status   06/23/2021 10.6 mg/dL Final      No results found for: CKMBINDEX, TROPHS, BNP, POCBNP   No results found for: WBC, HGB, HCT, MCV, PLT   Cholesterol   Date Value Ref Range Status   05/18/2021 222 mg/dL Final     Triglycerides   Date Value Ref Range Status   05/18/2021 104 mg/dL Final     HDL   Date Value Ref Range Status   05/18/2021 46 mg/dL Final     LDL Calculated   Date Value Ref Range Status   05/18/2021 140 mg/dL Final        Electrocardiogram: 7/14/2023  Sinus rhythm, heart rate 61 bpm.      Assessment/Plan     1. Bicuspid aortic valve  Bicuspid aortic valve with trace regurgitation, mild aortic stenosis.  No significant change from prior echocardiogram in 2021.  Continue to monitor.  NYHA class I    2. Thoracic aortic aneurysm without rupture, unspecified part (CMS/[facility])  Last measurement in 2021, 3.96.  Measurements on echocardiogram on 7/7/2023, 4.7 cm.  CT scan which will be scanned in for review, 4.2 cm.  Patient did provide me a copy of this at today's evaluation  I will discuss with primary cardiologist Dr. Pulliam on best surveillance imaging options.  Recommend aggressive blood pressure control to prevent further growth as noted below, routine cardiovascular exercise without significant weight lifting.  Light weight lifting is encouraged.  At a minimum, follow-up with primary cardiologist Dr. Pulliam in 1 year.  - Ambulatory referral to Cardiology    3. Essential hypertension  Reviewed blood pressure logs with patient.  Hydrochlorothiazide 12.5 mg daily.  I did state that the blood pressure goal for the patient should ideally be less than 120/80.  She was agreeable with this.  She will continue to work on dietary changes as well as exercise  to lower her blood pressure over the next month.  Per patient request, if a new blood pressure agent does need to be added, she would like to have a few options and know the risk, benefits, and side effects of the proposed medications.  She has been on atenolol without any side effects in the past.  - ECG 12 lead -Normal, Today       There are no Patient Instructions on file for this visit.    Return in about 1 year (around 7/14/2024) for Recheck - In Office; Dr. Pulliam.    The patient verbalized correct understanding and agreement to today's instructions and plan.  The patient verbalized that all questions have been addressed.    Tran Das CNP      A voice recognition program was used to aid in documentation of this record. Sometimes words are not printed exactly as they were spoken. While efforts were made to carefully edit and correct any inaccuracies, some errors may be present; please take these into context. Please contact the provider if errors are identified.    Answers submitted by the patient for this visit:  Cardiology ROS questionnaire (Submitted on 7/12/2023)  trouble swallowing: No  Acid reflux: No  Vision changes: No  Shortness of breath on exertion: Yes  Menopause: Yes  Sleep apnea: No  Thyroid problems: No  Are you diabetic?: No  Low blood count: No  Clotting problem: No  Major bleeding: No  blackouts: No  Open sores: No  Mood swings: No

## 2023-07-14 ENCOUNTER — OFFICE VISIT (OUTPATIENT)
Dept: CARDIOLOGY | Facility: CLINIC | Age: 63
End: 2023-07-14
Payer: OTHER GOVERNMENT

## 2023-07-14 VITALS
BODY MASS INDEX: 36.32 KG/M2 | DIASTOLIC BLOOD PRESSURE: 78 MMHG | OXYGEN SATURATION: 95 % | SYSTOLIC BLOOD PRESSURE: 120 MMHG | HEART RATE: 83 BPM | HEIGHT: 66 IN | WEIGHT: 226 LBS

## 2023-07-14 DIAGNOSIS — I71.20 THORACIC AORTIC ANEURYSM WITHOUT RUPTURE, UNSPECIFIED PART (CMS/HCC): ICD-10-CM

## 2023-07-14 DIAGNOSIS — I10 ESSENTIAL HYPERTENSION: ICD-10-CM

## 2023-07-14 DIAGNOSIS — Q23.81 BICUSPID AORTIC VALVE: Primary | ICD-10-CM

## 2023-07-14 PROCEDURE — 99214 OFFICE O/P EST MOD 30 MIN: CPT | Performed by: NURSE PRACTITIONER

## 2023-07-14 NOTE — LETTER
[facility]  [address] [place] [address]  [phone]  Dept: [phone]  07/14/23      Jacinta Sheffield MD  [address] [place] [address]        To: Jacinta Sheffield MD      Thank you for referring your patient, Jamir Acevedo, to receive care in my office. I have enclosed a summary of the care provided to Jamir on 07/14/23.    Please contact me with any questions you may have regarding the visit.    Sincerely,         Tran Das, CNP  [address] [place] [address]  [phone]    CC: No Recipients

## 2023-07-14 NOTE — Clinical Note
"Dr. Pulliam and Natali, please see attached visit note.  Dr. Pulliam, I would like recommendations of next surveillance imaging for this patient in regards to TAA. Enlargement since 2021, however, discrepancy in CT vs echocardiogram. Both done at [facility] so report only, no images.  Patient does express hesitation with Dr pamela and that is why she states she has not been back. Told her more blood pressure control is needed. She wanted your recommendation to what \"few agents\" would be recommended and then would take to PCP for evaluation. Trying diet first.  Thank you.  I told her I would call her next week."

## 2023-07-14 NOTE — LETTER
[facility]  [address] [facility] [place] [address]  [phone]  Dept: [phone]    July 14, 2023     Jacinta Sheffield MD  [address] [place] [address]    Patient: Jamir Acevedo   YOB: 1960   Date of Visit: 7/14/2023       Dear Dr. Aline Sheffield:    Thank you for referring Jamir Acevedo to me for evaluation. Below are my notes for this consultation.    If you have questions, please do not hesitate to call me. I look forward to following your patient along with you.         Sincerely,        Tran Das CNP        CC: No Tran Moya CNP  7/14/2023  3:46 PM  Sign when Signing Visit      Cardiology Outpatient Follow-up Note    Subjective   Patient ID: Jamir Acevedo is a 63 y.o. female.    Chief Complaint   Patient presents with   • Hypertension   • bicuspid aortic valve   • Hyperlipidemia        HPI  Patient is a very pleasant 63-year-old female.  She is followed by Dr. Pulliam.  She has a history of bicuspid aortic valve, palpitations, hyperlipidemia, hypertension, as well as EMERY.    She had a echocardiogram completed on 7/7/2023.  This did demonstrate a moderately dilated ascending aorta at 4.7 cm.  Mild aortic valve stenosis and bicuspid aortic valve with trace regurgitation.  Grade 1 diastolic dysfunction with normal LVEF.  Previous measurements of ascending aorta at 3.96 in 2021.  She also had a CT completed at the same time on 7/7/2023 at [facility] for coronary calcium score.  This also demonstrated an thoracic aneurysm of approximately 4.2 cm.  Total calcium score 142, 122 of the LAD.    Reviewed blood pressure logs.  Most of her blood pressures are greater than 130, less than 150 systolic.  She started red yeast rice on Monday for her cholesterol.  She had labs on 6/27/2023.  LDL was 184.  Which was increased from prior.  She is going to try dietary changes, exercising, as well as the red yeast  rice prior to going on cholesterol-lowering agents.  She does have a history of EMERY so therefore statin medication contraindicated.    Cardiology Problem List:      No specialty comments available.    Past Medical History:   Diagnosis Date   • Bicuspid aortic valve    • Carpal tunnel syndrome    • Cervicalgia    • Chronic bronchitis (CMS/HCC)    • History of palpitations    • HLD (hyperlipidemia)    • HTN (hypertension)    • Multiple thyroid nodules    • Nonalcoholic fatty liver disease        Past Surgical History:   Procedure Laterality Date   • BREAST BIOPSY Right 01/2020   • CARPAL TUNNEL RELEASE Bilateral    • COLONOSCOPY  02/2010   • HYSTERECTOMY     • TONSILLECTOMY     • UMBILICAL HERNIA REPAIR         Allergies as of 07/14/2023 - Reviewed 07/14/2023   Allergen Reaction Noted   • Wake Anaphylaxis 07/29/2021   • Neomy-polymyxinb-bacitracin-hc  07/29/2021       Current Outpatient Medications   Medication Sig Dispense Refill   • RED YEAST RICE ORAL Take 1 tablet by mouth 2 (two) times a day     • hydroCHLOROthiazide (MICROZIDE) 12.5 mg capsule Take 1 capsule (12.5 mg total) by mouth daily     • omega-3 acid ethyl esters (LOVAZA) 1 gram capsule Take 2 capsules (2 g total) by mouth 2 (two) times a day       No current facility-administered medications for this visit.       Family History   Problem Relation Age of Onset   • Stroke Mother    • Lung cancer Mother    • Heart disease Father    • Heart attack Father    • Heart disease Maternal Grandmother    • Cancer Maternal Grandfather    • Stroke Paternal Grandmother    • Heart attack Paternal Grandfather        Social History     Tobacco Use   • Smoking status: Never   • Smokeless tobacco: Never   Vaping Use   • Vaping Use: Never used   Substance Use Topics   • Alcohol use: Not Currently   • Drug use: Never       The following have been reviewed and updated as appropriate in this visit  Tobacco  Allergies  Meds  Problems  Med Hx  Surg Hx  Fam Hx       .    Review of Systems   Constitutional: Negative for chills, decreased appetite, diaphoresis, fever, malaise/fatigue, night sweats, weight gain and weight loss.   HENT:  Negative for congestion, hearing loss, nosebleeds, odynophagia and sore throat.    Eyes:  Negative for double vision and visual halos.   Cardiovascular:  Positive for irregular heartbeat. Negative for chest pain, claudication, cyanosis, leg swelling, near-syncope, orthopnea, palpitations, paroxysmal nocturnal dyspnea and syncope.   Respiratory:  Positive for shortness of breath, sleep disturbances due to breathing and sputum production. Negative for hemoptysis and snoring.    Endocrine: Positive for polydipsia. Negative for polyuria.   Hematologic/Lymphatic: Bruises/bleeds easily.   Skin:  Negative for color change, poor wound healing, rash, suspicious lesions and unusual hair distribution.   Musculoskeletal:  Positive for back pain. Negative for falls, joint pain, joint swelling, muscle weakness and myalgias.   Gastrointestinal:  Negative for diarrhea, hematochezia, melena, nausea and vomiting.   Genitourinary:  Positive for decreased libido and nocturia. Negative for dysuria, hematuria, incomplete emptying and urgency.   Neurological:  Positive for dizziness, headaches, light-headedness, loss of balance and tremors. Negative for focal weakness, numbness, seizures, sensory change and weakness.   Psychiatric/Behavioral:  Negative for altered mental status, depression, hallucinations and memory loss. The patient is not nervous/anxious.    Allergic/Immunologic: Positive for environmental allergies. Negative for HIV exposure and persistent infections.       Objective    Vitals:    07/14/23 1145   BP: 120/78   Pulse: 83   SpO2: 95%     Weight:   Weights (Current Encounter Only) (last 180 days)       Date/Time Weight    07/14/23 1145 102.5 kg (226 lb)             Physical Exam    Constitutional: Well built, nourished, no acute distress.   HEENT: Head is  normocephalic and atraumatic. Sclera anicteric.   Neck: Supple. No carotid bruits.  No JVD.  Lungs: Effort normal. Breath sounds are clear without wheezes or rales. No respiratory distress.   Heart: S1-S2, Regular rate and rhythm without murmur.   Extremities: Without edema. Radial pulses 2+.  Musculoskeletal: Normal ROM.    Neurologic: No focal or gross deficits.  Psychiartic: cooperative, alert and orientated X 3.  Skin: warm, dry, intact.      Data Review:     Sodium   Date Value Ref Range Status   06/23/2021 142 mmol/L Final     Potassium   Date Value Ref Range Status   06/23/2021 3.7 mmol/L Final     Chloride   Date Value Ref Range Status   06/23/2021 105 mmol/L Final     CO2   Date Value Ref Range Status   06/23/2021 28 mmol/L Final     BUN   Date Value Ref Range Status   06/23/2021 23 mg/dL Final     Creatinine   Date Value Ref Range Status   06/23/2021 1.00 mg/dL Final     Glucose   Date Value Ref Range Status   06/23/2021 96 mg/dL Final     Calcium   Date Value Ref Range Status   06/23/2021 10.6 mg/dL Final      No results found for: CKMBINDEX, TROPHS, BNP, POCBNP   No results found for: WBC, HGB, HCT, MCV, PLT   Cholesterol   Date Value Ref Range Status   05/18/2021 222 mg/dL Final     Triglycerides   Date Value Ref Range Status   05/18/2021 104 mg/dL Final     HDL   Date Value Ref Range Status   05/18/2021 46 mg/dL Final     LDL Calculated   Date Value Ref Range Status   05/18/2021 140 mg/dL Final        Electrocardiogram: 7/14/2023  Sinus rhythm, heart rate 61 bpm.      Assessment/Plan    1. Bicuspid aortic valve  Bicuspid aortic valve with trace regurgitation, mild aortic stenosis.  No significant change from prior echocardiogram in 2021.  Continue to monitor.  NYHA class I    2. Thoracic aortic aneurysm without rupture, unspecified part (CMS/HCC)  Last measurement in 2021, 3.96.  Measurements on echocardiogram on 7/7/2023, 4.7 cm.  CT scan which will be scanned in for review, 4.2 cm.  Patient did  provide me a copy of this at today's evaluation  I will discuss with primary cardiologist Dr. Pulliam on best surveillance imaging options.  Recommend aggressive blood pressure control to prevent further growth as noted below, routine cardiovascular exercise without significant weight lifting.  Light weight lifting is encouraged.  At a minimum, follow-up with primary cardiologist Dr. Pulliam in 1 year.  - Ambulatory referral to Cardiology    3. Essential hypertension  Reviewed blood pressure logs with patient.  Hydrochlorothiazide 12.5 mg daily.  I did state that the blood pressure goal for the patient should ideally be less than 120/80.  She was agreeable with this.  She will continue to work on dietary changes as well as exercise to lower her blood pressure over the next month.  Per patient request, if a new blood pressure agent does need to be added, she would like to have a few options and know the risk, benefits, and side effects of the proposed medications.  She has been on atenolol without any side effects in the past.  - ECG 12 lead -Normal, Today       There are no Patient Instructions on file for this visit.    Return in about 1 year (around 7/14/2024) for Recheck - In Office; Dr. Pulliam.    The patient verbalized correct understanding and agreement to today's instructions and plan.  The patient verbalized that all questions have been addressed.    Tran Das CNP      A voice recognition program was used to aid in documentation of this record. Sometimes words are not printed exactly as they were spoken. While efforts were made to carefully edit and correct any inaccuracies, some errors may be present; please take these into context. Please contact the provider if errors are identified.    Answers submitted by the patient for this visit:  Cardiology ROS questionnaire (Submitted on 7/12/2023)  trouble swallowing: No  Acid reflux: No  Vision changes: No  Shortness of breath on exertion: Yes  Menopause:  Yes  Sleep apnea: No  Thyroid problems: No  Are you diabetic?: No  Low blood count: No  Clotting problem: No  Major bleeding: No  blackouts: No  Open sores: No  Mood swings: No

## 2023-07-17 PROCEDURE — 93000 ELECTROCARDIOGRAM COMPLETE: CPT | Performed by: INTERNAL MEDICINE

## 2023-07-19 ENCOUNTER — TELEPHONE (OUTPATIENT)
Dept: CARDIOLOGY | Facility: CLINIC | Age: 63
End: 2023-07-19
Payer: OTHER GOVERNMENT

## 2023-07-19 DIAGNOSIS — I71.20 THORACIC AORTIC ANEURYSM WITHOUT RUPTURE, UNSPECIFIED PART (CMS/HCC): Primary | ICD-10-CM

## 2023-07-19 DIAGNOSIS — I10 ESSENTIAL HYPERTENSION: ICD-10-CM

## 2023-07-19 RX ORDER — ATENOLOL 25 MG/1
25 TABLET ORAL DAILY
Qty: 90 TABLET | Refills: 0 | Status: SHIPPED | OUTPATIENT
Start: 2023-07-19 | End: 2023-09-29 | Stop reason: SDUPTHER

## 2023-07-19 NOTE — TELEPHONE ENCOUNTER
"----- Message from Tran Das CNP sent at 7/17/2023 11:25 AM MDT -----  Can you please call [facility] with the recommendations?  Recommend CT chest for TTA monitoring no contrast needed in 6 months.  Recommend beta blocker therapy.  Dr. Pulliam had suggested metoprolol, a medication similar to atenolol.  Since you have been on atenolol on the past without concern, certainly would recommend restarting atenolol at 25 mg per day since we know how you tolerate that.    ThanksTran    ----- Message -----  From: Pascual Pulliam MD  Sent: 7/17/2023  11:14 AM MDT  To: Natali Roque RN; Tran Das CNP    I recommend we should patient repeat her CAT scan in 6 months.  There is discrepancy between echocardiogram and CAT scan findings but CAT scan is more reliable so we will stick with a CAT scan for now until we know the size has been stable for next 1 to 2 years.  I also recommend starting her on metoprolol if blood pressure and heart rate permits.  ----- Message -----  From: Tran Das CNP  Sent: 7/14/2023   3:46 PM MDT  To: Pascual Pulliam MD; Natali Roque, RN    Dr. Pulliam and Natali, please see attached visit note.  Dr. Pulliam, I would like recommendations of next surveillance imaging for this patient in regards to TAA. Enlargement since 2021, however, discrepancy in CT vs echocardiogram. Both done at [facility] so report only, no images.  Patient does express hesitation with Dr santiago and that is why she states she has not been back. Told her more blood pressure control is needed. She wanted your recommendation to what \"few agents\" would be recommended and then would take to PCP for evaluation. Trying diet first.  Thank you.  I told her I would call her next week.      "

## 2023-07-19 NOTE — TELEPHONE ENCOUNTER
Called patient and advised of recommendations from Tran Das CNP and Dr. Pulliam.  Patient is in agreement with this plan and change in medications.  Orders placed for repeat CT without contrast of chest to be done in 6 months placed and orders placed for patient to start atenolol 25 mg daily.  Advised patient to continue monitoring blood pressure and heart rate and call back to clinic if she has significant drop in either of these or does not feel well starting this medication.  Patient verbalized understanding and was in agreement with this plan.  All questions were answered and patient has no further questions or concerns at this time.

## 2023-09-29 DIAGNOSIS — I10 ESSENTIAL HYPERTENSION: ICD-10-CM

## 2023-09-29 DIAGNOSIS — I71.20 THORACIC AORTIC ANEURYSM WITHOUT RUPTURE, UNSPECIFIED PART (CMS/HCC): ICD-10-CM

## 2023-09-29 RX ORDER — ATENOLOL 25 MG/1
25 TABLET ORAL DAILY
Qty: 90 TABLET | Refills: 1 | Status: SHIPPED | OUTPATIENT
Start: 2023-09-29 | End: 2024-02-13 | Stop reason: ALTCHOICE

## 2023-09-29 NOTE — TELEPHONE ENCOUNTER
Patient called in and requested refill for atenolol 25mg daily.     Medication refilled per patient request.     Patient states that she is tolerating this medication well and has no concerns with that at this time.

## 2024-01-18 ENCOUNTER — PATIENT MESSAGE (OUTPATIENT)
Dept: CARDIOLOGY | Facility: CLINIC | Age: 64
End: 2024-01-18
Payer: OTHER GOVERNMENT

## 2024-01-22 ENCOUNTER — TELEPHONE (OUTPATIENT)
Dept: CARDIOLOGY | Facility: CLINIC | Age: 64
End: 2024-01-22
Payer: OTHER GOVERNMENT

## 2024-01-22 DIAGNOSIS — I71.20 THORACIC AORTIC ANEURYSM WITHOUT RUPTURE, UNSPECIFIED PART (CMS/HCC): Primary | ICD-10-CM

## 2024-01-22 NOTE — TELEPHONE ENCOUNTER
You are scheduled for CT on 1/23/2024 at 2:50 PM.  You will need to arrive at the [place] entrance at  2:35 PM.  You may not have anything to eat or drink after 10:50 AM.  Please drink extra water prior to 10:50 AM.     Jamir verbalized understanding.  She is very concerned that there may be other ingredients in her contrast.  I called Manuel in CT and he will provide her with a sheet of information.

## 2024-01-22 NOTE — TELEPHONE ENCOUNTER
Received VO from Tran Das CNP to change CT chest from without contrast to WITH contrast. This was completed and nurse scheduling team notified. MH RN

## 2024-01-23 NOTE — TELEPHONE ENCOUNTER
Due to concerns about a referral, I called her mom and rescheduled her CT chest to 2/13/2024 at 8:20 AM.  She is aware that she will need to arrive at 8:05 AM.  She is also aware that she is to have nothing to eat or drink for 4 hours prior to her CT scan.

## 2024-01-25 NOTE — TELEPHONE ENCOUNTER
Tran Das CNP 1/25/2024 1:35 PM MST    Please schedule follow-up with Dr. Pulliam for this patient after CT scan on 2/13/2024, thanks.    Tran Das CNP   ----- Message -----  From: Ludy Gonzalez MA  Sent: 1/25/2024 1:13 PM MST  To: Tran Das CNP  Subject: FW: Labs done, need CT scan appt       ----- Message -----  From: Jamir Acevedo  Sent: 1/24/2024 6:34 PM MST  To: *  Subject: Labs done, need CT scan appt     Hi, no I am not interested in these injectable medications. I've looked into what these do and the side effects. If you want to discuss further, we can do that after my CT scan on 2/13/24. I assume I will have a follow-up appointment with you or Dr. Pulliam.Thank you,  Jamir Acevedo

## 2024-02-09 ENCOUNTER — TELEPHONE (OUTPATIENT)
Dept: CARDIOLOGY | Facility: CLINIC | Age: 64
End: 2024-02-09
Payer: OTHER GOVERNMENT

## 2024-02-09 DIAGNOSIS — I71.20 THORACIC AORTIC ANEURYSM WITHOUT RUPTURE, UNSPECIFIED PART (CMS/HCC): Primary | ICD-10-CM

## 2024-02-09 NOTE — TELEPHONE ENCOUNTER
See below, order changed.     Tran Das CNP Hess, Melissa, RN Ya          Previous Messages       ----- Message -----  From: Kailee Razo RN  Sent: 2/8/2024  11:20 AM MST  To: Tran Das CNP  Subject: Change order                                    We have a CT Chest W ordered for Ermma looking at the aorta. Can we get that changed to CT Angio Chest please? The CTA order is the rads recommended scan for this diagnosis.    Above is from radiology, can I change the order please?

## 2024-02-12 ASSESSMENT — ENCOUNTER SYMPTOMS
ORTHOPNEA: 0
PERSISTENT INFECTIONS: 0
SPUTUM PRODUCTION: 0
FOCAL WEAKNESS: 0
SLEEP DISTURBANCES DUE TO BREATHING: 0
POOR WOUND HEALING: 0
BRUISES/BLEEDS EASILY: 0
ALTERED MENTAL STATUS: 0
BACK PAIN: 1
COLOR CHANGE: 0
FEVER: 0
FALLS: 0
MEMORY LOSS: 0
VISUAL CHANGE: 0
JOINT SWELLING: 0
LIGHT-HEADEDNESS: 1
HEADACHES: 1
WEIGHT GAIN: 0
DOUBLE VISION: 0
HALLUCINATIONS: 0
DYSURIA: 0
NERVOUS/ANXIOUS: 0
SENSORY CHANGE: 0
HEMATOCHEZIA: 0
ODYNOPHAGIA: 0
MYALGIAS: 0
DECREASED LIBIDO: 1
IRREGULAR HEARTBEAT: 0
NUMBNESS: 0
SHORTNESS OF BREATH: 1
POLYDIPSIA: 1
VOMITING: 0
HEMATURIA: 0
SUSPICIOUS LESIONS: 0
NAUSEA: 0
SORE THROAT: 0
CHILLS: 0
TROUBLE SWALLOWING: 0
TREMORS: 1
HEMOPTYSIS: 0
DIZZINESS: 1
WEIGHT LOSS: 0
DEPRESSION: 0
NIGHT SWEATS: 0
SYNCOPE: 0
SEIZURES: 0
CLAUDICATION: 0
SNORING: 0
PND: 0
DIARRHEA: 1
LOSS OF BALANCE: 1
BLACKOUTS: 0
VISUAL HALOS: 0
PALPITATIONS: 0
UNUSUAL HAIR DISTRIBUTION: 0
WEAKNESS: 0
DIAPHORESIS: 0
DECREASED APPETITE: 0
NEAR-SYNCOPE: 0

## 2024-02-13 ENCOUNTER — TELEPHONE (OUTPATIENT)
Dept: CARDIOLOGY | Facility: CLINIC | Age: 64
End: 2024-02-13
Payer: OTHER GOVERNMENT

## 2024-02-13 ENCOUNTER — OFFICE VISIT (OUTPATIENT)
Dept: CARDIOLOGY | Facility: CLINIC | Age: 64
End: 2024-02-13
Payer: OTHER GOVERNMENT

## 2024-02-13 ENCOUNTER — HOSPITAL ENCOUNTER (OUTPATIENT)
Dept: CT IMAGING | Facility: HOSPITAL | Age: 64
Discharge: HOME | End: 2024-02-13
Payer: OTHER GOVERNMENT

## 2024-02-13 VITALS
RESPIRATION RATE: 16 BRPM | HEART RATE: 47 BPM | HEIGHT: 66 IN | BODY MASS INDEX: 35.68 KG/M2 | DIASTOLIC BLOOD PRESSURE: 76 MMHG | SYSTOLIC BLOOD PRESSURE: 124 MMHG | WEIGHT: 222 LBS | OXYGEN SATURATION: 95 %

## 2024-02-13 DIAGNOSIS — I35.0 NONRHEUMATIC AORTIC VALVE STENOSIS: ICD-10-CM

## 2024-02-13 DIAGNOSIS — I71.20 THORACIC AORTIC ANEURYSM WITHOUT RUPTURE, UNSPECIFIED PART (CMS/HCC): ICD-10-CM

## 2024-02-13 DIAGNOSIS — E78.00 PURE HYPERCHOLESTEROLEMIA: ICD-10-CM

## 2024-02-13 DIAGNOSIS — Q23.81 BICUSPID AORTIC VALVE: ICD-10-CM

## 2024-02-13 DIAGNOSIS — I10 ESSENTIAL HYPERTENSION: Primary | ICD-10-CM

## 2024-02-13 DIAGNOSIS — I51.89 LEFT ATRIAL MASS: Primary | ICD-10-CM

## 2024-02-13 PROCEDURE — 2550000100 HC RX 255: Mod: JZ | Performed by: NURSE PRACTITIONER

## 2024-02-13 PROCEDURE — 71275 CT ANGIOGRAPHY CHEST: CPT

## 2024-02-13 PROCEDURE — 99214 OFFICE O/P EST MOD 30 MIN: CPT | Performed by: INTERNAL MEDICINE

## 2024-02-13 PROCEDURE — 93000 ELECTROCARDIOGRAM COMPLETE: CPT | Performed by: INTERNAL MEDICINE

## 2024-02-13 RX ORDER — IOPAMIDOL 755 MG/ML
80 INJECTION, SOLUTION INTRAVASCULAR ONCE
Status: COMPLETED | OUTPATIENT
Start: 2024-02-13 | End: 2024-02-13

## 2024-02-13 RX ORDER — ASPIRIN 81 MG/1
81 TABLET ORAL DAILY
COMMUNITY

## 2024-02-13 RX ADMIN — IOPAMIDOL 80 ML: 755 INJECTION, SOLUTION INTRAVENOUS at 08:45

## 2024-02-13 ASSESSMENT — ENCOUNTER SYMPTOMS
ODYNOPHAGIA: 0
BRUISES/BLEEDS EASILY: 0
HEMOPTYSIS: 0
FOCAL WEAKNESS: 0
SYNCOPE: 0
NAUSEA: 0
NEAR-SYNCOPE: 0
NIGHT SWEATS: 0
UNUSUAL HAIR DISTRIBUTION: 0
SLEEP DISTURBANCES DUE TO BREATHING: 0
SHORTNESS OF BREATH: 1
PERSISTENT INFECTIONS: 0
DECREASED APPETITE: 0
DECREASED LIBIDO: 1
DIARRHEA: 1
NERVOUS/ANXIOUS: 0
DOUBLE VISION: 0
HEADACHES: 1
HEMATURIA: 0
DIAPHORESIS: 0
LIGHT-HEADEDNESS: 1
MEMORY LOSS: 0
BLACKOUTS: 0
NUMBNESS: 0
ALTERED MENTAL STATUS: 0
WEIGHT GAIN: 0
HEMATOCHEZIA: 0
DIZZINESS: 1
BACK PAIN: 1
COLOR CHANGE: 0
PND: 0
HYPERTENSION: 1
SEIZURES: 0
VOMITING: 0
CHILLS: 0
SORE THROAT: 0
VISUAL HALOS: 0
JOINT SWELLING: 0
TROUBLE SWALLOWING: 0
WEIGHT LOSS: 0
SUSPICIOUS LESIONS: 0
POOR WOUND HEALING: 0
SNORING: 0
SENSORY CHANGE: 0
ORTHOPNEA: 0
IRREGULAR HEARTBEAT: 0
VISUAL CHANGE: 0
WEAKNESS: 0
CLAUDICATION: 0
TREMORS: 1
MYALGIAS: 0
FALLS: 0
FEVER: 0
HALLUCINATIONS: 0
DYSURIA: 0
LOSS OF BALANCE: 1
SPUTUM PRODUCTION: 0
DEPRESSION: 0
POLYDIPSIA: 1
PALPITATIONS: 0

## 2024-02-13 NOTE — PROGRESS NOTES
[facility]     CARDIOLOGY OUTPATIENT CONSULT NOTE       Chief Complaint   Patient presents with    Hypertension    Hyperlipidemia    Valve Disorder    Thoracic aortic aneurysm       Hypertension  Associated symptoms include headaches, malaise/fatigue and shortness of breath (chronic bronchitis). Pertinent negatives include no chest pain, orthopnea, palpitations or PND. There is no history of sleep apnea or a thyroid problem.   Hyperlipidemia  Associated symptoms include shortness of breath (chronic bronchitis). Pertinent negatives include no chest pain, focal weakness or myalgias.     Subjective patient presents to me for follow-up   Patient ID: Jamir Acevedo is a 63 y.o. female.  63-year-old female with a past medical history of bicuspid aortic valve, mild aortic stenosis, ascending aortic aneurysm of size 4.5 cm, hypertension and hyperlipidemia present for follow-up.  Patient states that she just feels very tired and fatigued.  She thinks this is since the atenolol was started.  She can do her normal day-to-day activities without having any chest pain or shortness of breath more than normal.  No passing out spells.  No orthopnea or PND.  She is having gallbladder surgery and she is going to go for the surgery in the near future.  She was started for sleep apnea about 2 years back that came out to be negative.    CARDIAC PROBLEM LIST:  No specialty comments available.     Past Medical History:   Diagnosis Date    Allergic 1987 - seasonal    Back pain 9/6/2021    Bicuspid aortic valve     Carpal tunnel syndrome     Cervicalgia     Chronic bronchitis (CMS/HCC)     COPD (chronic obstructive pulmonary disease) (CMS/HCC) 2015 started ?    Gallstones     History of palpitations     HLD (hyperlipidemia)     HTN (hypertension)     Multiple thyroid nodules     Nonalcoholic fatty liver disease     Obesity 2008 to current    Pneumonia 1992 - 1996    Varicella 1966     Past Surgical  History:   Procedure Laterality Date    BI STEREOTACTIC BREAST BIOPSY RIGHT Right 07/18/2023    BI STEREOTACTIC BREAST BIOPSY RIGHT 7/18/2023 TORSTEN RAD EXT FILM    BREAST BIOPSY Right 01/2020    CARPAL TUNNEL RELEASE Bilateral     COLONOSCOPY  02/2010    HYSTERECTOMY      TONSILLECTOMY      TONSILLECTOMY  1967    TYMPANOSTOMY      UMBILICAL HERNIA REPAIR         Allergies as of 02/13/2024 - Reviewed 02/13/2024   Allergen Reaction Noted    [place] Anaphylaxis 07/29/2021    Neomy-polymyxinb-bacitracin-hc  07/29/2021     Current Outpatient Medications   Medication Sig Dispense Refill    aspirin 81 mg EC tablet Take 1 tablet (81 mg total) by mouth daily      RED YEAST RICE ORAL Take 1 tablet by mouth 2 (two) times a day      hydroCHLOROthiazide (MICROZIDE) 12.5 mg capsule Take 1 capsule (12.5 mg total) by mouth daily      omega-3 acid ethyl esters (LOVAZA) 1 gram capsule Take 2 capsules (2 g total) by mouth 2 (two) times a day       No current facility-administered medications for this visit.       Family History   Problem Relation Age of Onset    Stroke Mother     Lung cancer Mother     Cancer Mother     COPD Mother     Heart disease Father     Heart attack Father     Heart disease Maternal Grandmother     Cancer Maternal Grandfather     Diabetes Maternal Grandfather     Stroke Paternal Grandmother     Heart attack Paternal Grandfather     Heart disease Paternal Grandfather      Social History     Tobacco Use    Smoking status: Never    Smokeless tobacco: Never   Vaping Use    Vaping Use: Never used   Substance Use Topics    Alcohol use: Not Currently     Comment: Maybe 1 per month of either wine or beer, very seldom    Drug use: Never       Review of Systems  Review of Systems   Constitutional: Positive for malaise/fatigue. Negative for chills, decreased appetite, diaphoresis, fever, night sweats, weight gain and weight loss.   HENT:  Negative for congestion, hearing loss, nosebleeds, odynophagia, sore throat and  "trouble swallowing.    Eyes:  Negative for double vision, visual halos and visual changes.   Cardiovascular:  Positive for dyspnea on exertion. Negative for chest pain, claudication, cyanosis, irregular heartbeat, leg swelling/pain, near-syncope, orthopnea, palpitations, PND and syncope/fainting.   Respiratory:  Positive for shortness of breath (chronic bronchitis). Negative for hemoptysis-coughing up blood, sleep disturbances due to breathing, snoring, sputum production and sleep apnea.    Endocrine: Positive for polydipsia. Negative for diabetic, polyuria and thyroid problem.   Hematologic/Lymphatic: Negative for clotting problem, major bleeding and cytopenia. Does not bruise/bleed easily.   Skin:  Negative for color change, poor wound healing, rash, suspicious lesions, unusual hair distribution and open sores.   Musculoskeletal:  Positive for back pain and muscle weakness. Negative for falls, joint pain, joint swelling and myalgias/muscle aches.   Gastrointestinal:  Positive for diarrhea. Negative for trouble swallowing, hematochezia, nausea, vomiting, acid reflux and melena.   Genitourinary:  Positive for decreased libido, nocturia and menopause. Negative for dysuria, hematuria, incomplete emptying and urgency.   Neurological:  Positive for dizziness (increased since atenolol), headaches, light-headedness, loss of balance and tremors. Negative for focal weakness, numbness, seizures, sensory change, weakness and blackouts.   Psychiatric/Behavioral:  Negative for altered mental status, depression, hallucinations, memory loss and mood swings. The patient is not nervous/anxious.    Allergic/Immunologic: Positive for environmental allergies. Negative for HIV exposure and persistent infections.         Objective   Vitals:    02/13/24 1039 02/13/24 1043 02/13/24 1047   Orthostatic BP:  120/60 120/58   BP: 124/76     Pulse: 47     Resp: 16     SpO2: 95%     Height: 1.676 m (5' 6\")     Weight: 100.7 kg (222 lb)   " "  Orthostatic Pulse:  49 48   Patient Position: Sitting Sitting Standing     Weight: 100.7 kg (222 lb)    Physical Exam  General examination: Patient sitting comfortably in chair, not in any acute distress  HEENT: Atraumatic normocephalic, normal jugular venous distention  Cardiovascular: Apical impulse normally placed, S1-S2 normal, aortic click heard systolic murmur appreciated, no gallops, regular rate and rhythm  Respiratory: Bilateral breath sounds are equal and clear, no adventitious sounds heard, no wheezing and no crackles  Extremities: No pedal edema, no ulcers  Pulses: Radial pulses 2+ and equal in both upper extremities, dorsal pedis and posterior tibial 2+ and equal in both lower extremities, no carotid bruit  Skin: Warm and pink, no obvious lesions seen  Neurology: Patient awake alert and oriented, power equal in for all 4 extremities, no gross neurological deficit found    Data Review:   Sodium   Date Value Ref Range Status   06/23/2021 142 mmol/L Final     Potassium   Date Value Ref Range Status   06/23/2021 3.7 mmol/L Final     Chloride   Date Value Ref Range Status   06/23/2021 105 mmol/L Final     CO2   Date Value Ref Range Status   06/23/2021 28 mmol/L Final     BUN   Date Value Ref Range Status   06/23/2021 23 mg/dL Final     Creatinine   Date Value Ref Range Status   06/23/2021 1.00 mg/dL Final     Glucose   Date Value Ref Range Status   06/23/2021 96 mg/dL Final     Calcium   Date Value Ref Range Status   06/23/2021 10.6 mg/dL Final     No results found for: \"CKTOTAL\", \"CKMB\", \"CKMBINDEX\", \"TROPONINI\", \"BNP\", \"POCBNP\"  Lipids:    Lab Results   Component Value Date    CHOL 222 05/18/2021     Lab Results   Component Value Date    HDL 46 05/18/2021     Lab Results   Component Value Date    LDLCALC 140 05/18/2021     Lab Results   Component Value Date    TRIG 104 05/18/2021        TSH: No results found for: \"TSH\"  Magnesium: No results found for: \"MG\"  Protime-INR: No results found for: \"PT\", " "\"INR\"  A1c: No results found for: \"HGBA1C\"    Electrocardiogram: 2/13/2024  Sinus bradycardia with a heart rate of 47 bpm    Assessment/Plan   Diagnoses and all orders for this visit:    Essential hypertension  -     ECG 12 lead -Normal, Today    Bicuspid aortic valve    Nonrheumatic aortic valve stenosis    Pure hypercholesterolemia    Thoracic aortic aneurysm without rupture, unspecified part (CMS/[facility])      63-year-old male with past medical history of hypertension, hyperlipidemia, bicuspid aortic valve with mild aortic stenosis and ascending aortic aneurysm present for follow-up.  Patient main complaint is tiredness and fatigue.  She was tested negative for sleep apnea.  She thinks her symptoms got worse after starting the atenolol.  She is also pretty bradycardic with a heart rate of 47 bpm.  I recommend stopping the atenolol and see if her symptoms improve.  Also her blood   Patient has bicuspid aortic valve and she has mild aortic stenosis.  Her last echo was in July 2023.  She also has 4.5 cm ascending aortic aneurysm.  Ascending aorta was 3.96 cm in 2021 which has significantly increased.CAT scan also showed an echodensity in the left atrium near the septum.  It could be just atrial septal aneurysm versus a mass.  I recommend getting a transesophageal echocardiogram to further evaluate the echodensity.  Patient needs a repeat CAT scan in 6 months to assess the size of ascending aortic aneurysm.  Discussed the patient about getting a transesophageal echocardiogram to evaluate left atrial mass.  Patient agreed for the procedure.  She did have this procedure 10 years back as well.  Follow-up in 6 months.      Sincerely,      Electronically signed by: Pascual Pulliam MD  2/13/2024  11:15 AM    "

## 2024-02-13 NOTE — TELEPHONE ENCOUNTER
Date of 320 10 AM provider time to time for KENDRICK with team.  She is available, sent message to scheduling to arrange with cardiac services.  Patient is aware will be calling back with instructions unfinalized time.  Message sent to ortho team as patient has

## 2024-02-13 NOTE — H&P (VIEW-ONLY)
[facility] Heart and Vascular [facility]     CARDIOLOGY OUTPATIENT CONSULT NOTE       Chief Complaint   Patient presents with    Hypertension    Hyperlipidemia    Valve Disorder    Thoracic aortic aneurysm       Hypertension  Associated symptoms include headaches, malaise/fatigue and shortness of breath (chronic bronchitis). Pertinent negatives include no chest pain, orthopnea, palpitations or PND. There is no history of sleep apnea or a thyroid problem.   Hyperlipidemia  Associated symptoms include shortness of breath (chronic bronchitis). Pertinent negatives include no chest pain, focal weakness or myalgias.     Subjective patient presents to me for follow-up   Patient ID: Jamir Acevedo is a 63 y.o. female.  63-year-old female with a past medical history of bicuspid aortic valve, mild aortic stenosis, ascending aortic aneurysm of size 4.5 cm, hypertension and hyperlipidemia present for follow-up.  Patient states that she just feels very tired and fatigued.  She thinks this is since the atenolol was started.  She can do her normal day-to-day activities without having any chest pain or shortness of breath more than normal.  No passing out spells.  No orthopnea or PND.  She is having gallbladder surgery and she is going to go for the surgery in the near future.  She was started for sleep apnea about 2 years back that came out to be negative.    CARDIAC PROBLEM LIST:  No specialty comments available.     Past Medical History:   Diagnosis Date    Allergic 1987 - seasonal    Back pain 9/6/2021    Bicuspid aortic valve     Carpal tunnel syndrome     Cervicalgia     Chronic bronchitis (CMS/HCC)     COPD (chronic obstructive pulmonary disease) (CMS/HCC) 2015 started ?    Gallstones     History of palpitations     HLD (hyperlipidemia)     HTN (hypertension)     Multiple thyroid nodules     Nonalcoholic fatty liver disease     Obesity 2008 to current    Pneumonia 1992 - 1996    Varicella 1966     Past Surgical  History:   Procedure Laterality Date    BI STEREOTACTIC BREAST BIOPSY RIGHT Right 07/18/2023    BI STEREOTACTIC BREAST BIOPSY RIGHT 7/18/2023 TORSTEN RAD EXT FILM    BREAST BIOPSY Right 01/2020    CARPAL TUNNEL RELEASE Bilateral     COLONOSCOPY  02/2010    HYSTERECTOMY      TONSILLECTOMY      TONSILLECTOMY  1967    TYMPANOSTOMY      UMBILICAL HERNIA REPAIR         Allergies as of 02/13/2024 - Reviewed 02/13/2024   Allergen Reaction Noted    [place] Anaphylaxis 07/29/2021    Neomy-polymyxinb-bacitracin-hc  07/29/2021     Current Outpatient Medications   Medication Sig Dispense Refill    aspirin 81 mg EC tablet Take 1 tablet (81 mg total) by mouth daily      RED YEAST RICE ORAL Take 1 tablet by mouth 2 (two) times a day      hydroCHLOROthiazide (MICROZIDE) 12.5 mg capsule Take 1 capsule (12.5 mg total) by mouth daily      omega-3 acid ethyl esters (LOVAZA) 1 gram capsule Take 2 capsules (2 g total) by mouth 2 (two) times a day       No current facility-administered medications for this visit.       Family History   Problem Relation Age of Onset    Stroke Mother     Lung cancer Mother     Cancer Mother     COPD Mother     Heart disease Father     Heart attack Father     Heart disease Maternal Grandmother     Cancer Maternal Grandfather     Diabetes Maternal Grandfather     Stroke Paternal Grandmother     Heart attack Paternal Grandfather     Heart disease Paternal Grandfather      Social History     Tobacco Use    Smoking status: Never    Smokeless tobacco: Never   Vaping Use    Vaping Use: Never used   Substance Use Topics    Alcohol use: Not Currently     Comment: Maybe 1 per month of either wine or beer, very seldom    Drug use: Never       Review of Systems  Review of Systems   Constitutional: Positive for malaise/fatigue. Negative for chills, decreased appetite, diaphoresis, fever, night sweats, weight gain and weight loss.   HENT:  Negative for congestion, hearing loss, nosebleeds, odynophagia, sore throat and  "trouble swallowing.    Eyes:  Negative for double vision, visual halos and visual changes.   Cardiovascular:  Positive for dyspnea on exertion. Negative for chest pain, claudication, cyanosis, irregular heartbeat, leg swelling/pain, near-syncope, orthopnea, palpitations, PND and syncope/fainting.   Respiratory:  Positive for shortness of breath (chronic bronchitis). Negative for hemoptysis-coughing up blood, sleep disturbances due to breathing, snoring, sputum production and sleep apnea.    Endocrine: Positive for polydipsia. Negative for diabetic, polyuria and thyroid problem.   Hematologic/Lymphatic: Negative for clotting problem, major bleeding and cytopenia. Does not bruise/bleed easily.   Skin:  Negative for color change, poor wound healing, rash, suspicious lesions, unusual hair distribution and open sores.   Musculoskeletal:  Positive for back pain and muscle weakness. Negative for falls, joint pain, joint swelling and myalgias/muscle aches.   Gastrointestinal:  Positive for diarrhea. Negative for trouble swallowing, hematochezia, nausea, vomiting, acid reflux and melena.   Genitourinary:  Positive for decreased libido, nocturia and menopause. Negative for dysuria, hematuria, incomplete emptying and urgency.   Neurological:  Positive for dizziness (increased since atenolol), headaches, light-headedness, loss of balance and tremors. Negative for focal weakness, numbness, seizures, sensory change, weakness and blackouts.   Psychiatric/Behavioral:  Negative for altered mental status, depression, hallucinations, memory loss and mood swings. The patient is not nervous/anxious.    Allergic/Immunologic: Positive for environmental allergies. Negative for HIV exposure and persistent infections.         Objective   Vitals:    02/13/24 1039 02/13/24 1043 02/13/24 1047   Orthostatic BP:  120/60 120/58   BP: 124/76     Pulse: 47     Resp: 16     SpO2: 95%     Height: 1.676 m (5' 6\")     Weight: 100.7 kg (222 lb)   " "  Orthostatic Pulse:  49 48   Patient Position: Sitting Sitting Standing     Weight: 100.7 kg (222 lb)    Physical Exam  General examination: Patient sitting comfortably in chair, not in any acute distress  HEENT: Atraumatic normocephalic, normal jugular venous distention  Cardiovascular: Apical impulse normally placed, S1-S2 normal, aortic click heard systolic murmur appreciated, no gallops, regular rate and rhythm  Respiratory: Bilateral breath sounds are equal and clear, no adventitious sounds heard, no wheezing and no crackles  Extremities: No pedal edema, no ulcers  Pulses: Radial pulses 2+ and equal in both upper extremities, dorsal pedis and posterior tibial 2+ and equal in both lower extremities, no carotid bruit  Skin: Warm and pink, no obvious lesions seen  Neurology: Patient awake alert and oriented, power equal in for all 4 extremities, no gross neurological deficit found    Data Review:   Sodium   Date Value Ref Range Status   06/23/2021 142 mmol/L Final     Potassium   Date Value Ref Range Status   06/23/2021 3.7 mmol/L Final     Chloride   Date Value Ref Range Status   06/23/2021 105 mmol/L Final     CO2   Date Value Ref Range Status   06/23/2021 28 mmol/L Final     BUN   Date Value Ref Range Status   06/23/2021 23 mg/dL Final     Creatinine   Date Value Ref Range Status   06/23/2021 1.00 mg/dL Final     Glucose   Date Value Ref Range Status   06/23/2021 96 mg/dL Final     Calcium   Date Value Ref Range Status   06/23/2021 10.6 mg/dL Final     No results found for: \"CKTOTAL\", \"CKMB\", \"CKMBINDEX\", \"TROPONINI\", \"BNP\", \"POCBNP\"  Lipids:    Lab Results   Component Value Date    CHOL 222 05/18/2021     Lab Results   Component Value Date    HDL 46 05/18/2021     Lab Results   Component Value Date    LDLCALC 140 05/18/2021     Lab Results   Component Value Date    TRIG 104 05/18/2021        TSH: No results found for: \"TSH\"  Magnesium: No results found for: \"MG\"  Protime-INR: No results found for: \"PT\", " "\"INR\"  A1c: No results found for: \"HGBA1C\"    Electrocardiogram: 2/13/2024  Sinus bradycardia with a heart rate of 47 bpm    Assessment/Plan   Diagnoses and all orders for this visit:    Essential hypertension  -     ECG 12 lead -Normal, Today    Bicuspid aortic valve    Nonrheumatic aortic valve stenosis    Pure hypercholesterolemia    Thoracic aortic aneurysm without rupture, unspecified part (CMS/[facility])      63-year-old male with past medical history of hypertension, hyperlipidemia, bicuspid aortic valve with mild aortic stenosis and ascending aortic aneurysm present for follow-up.  Patient main complaint is tiredness and fatigue.  She was tested negative for sleep apnea.  She thinks her symptoms got worse after starting the atenolol.  She is also pretty bradycardic with a heart rate of 47 bpm.  I recommend stopping the atenolol and see if her symptoms improve.  Also her blood   Patient has bicuspid aortic valve and she has mild aortic stenosis.  Her last echo was in July 2023.  She also has 4.5 cm ascending aortic aneurysm.  Ascending aorta was 3.96 cm in 2021 which has significantly increased.CAT scan also showed an echodensity in the left atrium near the septum.  It could be just atrial septal aneurysm versus a mass.  I recommend getting a transesophageal echocardiogram to further evaluate the echodensity.  Patient needs a repeat CAT scan in 6 months to assess the size of ascending aortic aneurysm.  Discussed the patient about getting a transesophageal echocardiogram to evaluate left atrial mass.  Patient agreed for the procedure.  She did have this procedure 10 years back as well.  Follow-up in 6 months.      Sincerely,      Electronically signed by: Pascual Pulliam MD  2/13/2024  11:15 AM    "

## 2024-02-14 NOTE — TELEPHONE ENCOUNTER
You are scheduled to have a transesophageal echocardiogram to be done on 3/14/2024 at 8 AM by Dr. Pulliam. At [facility].    Please arrive at the [facility] entrance and check in at the  of [facility] at 6:30 AM on 3/14/2024.  Please bring a complete, current and accurate list of all your medications, dosages and instructions.      [facility] currently has a two-visitor policy with visiting hours from 7:30 AM to 7:30 PM.  It is not required but it is recommended that you wear a mask while you are in the hospital.     Please do not eat or drink anything after midnight the day of your procedure.  You may have clear liquids until 4:30 AM or two hours prior to your arrival time on the day of the procedure.  The procedure may be delayed if these guidelines are not followed.     You may take your morning medications with a sip of water with the exception of any vitamins, supplements or any diuretics.  Cardiac services will call you for further information    We do have  parking from 5:00 AM-4:30 PM for your convenience. This is a free service available to you and your support person.    Do not make any plans for the remainder of the day. You will need a  and someone to stay with you when you are discharged after your procedure.    If you are diagnosed with COVID or have any COVID symptoms between now and the time of your scheduled procedure, please contact me.    Patient verbalizes understanding of all instructions and denies further questions.

## 2024-03-12 ENCOUNTER — PRE-ADMISSION TESTING (OUTPATIENT)
Dept: PREADMISSION TESTING | Facility: HOSPITAL | Age: 64
End: 2024-03-12
Payer: OTHER GOVERNMENT

## 2024-03-12 VITALS
DIASTOLIC BLOOD PRESSURE: 98 MMHG | WEIGHT: 222.4 LBS | SYSTOLIC BLOOD PRESSURE: 163 MMHG | TEMPERATURE: 96.8 F | HEIGHT: 66 IN | HEART RATE: 72 BPM | OXYGEN SATURATION: 95 % | BODY MASS INDEX: 35.74 KG/M2

## 2024-03-12 NOTE — PRE-PROCEDURE INSTRUCTIONS
Pre-Surgery Instructions: Per Anesthesia Guidelines   Medication Instructions    aspirin 81 mg EC tablet Continue as prescribed do not hold    RED YEAST RICE ORAL Stop 1 week prior to surgery/procedure    hydroCHLOROthiazide (MICROZIDE) 12.5 mg capsule Continue as prescribed do not hold    omega-3 acid ethyl esters (LOVAZA) 1 gram capsule Stop 1 week prior to surgery/procedure     Please hold all vitamins/supplements day of procedure, unless otherwise directed by surgical provider. Hold all herbals for one week prior to procedure.     Please avoid all NSAID's (ibuprofen, aleve, celexicob, etodolac, indomethacin, meloxicam, nabumetone, naproxen) for 7 days prior to procedure.     Acetaminophen (tylenol) is okay to take for discomfort, even on the morning of surgery, if needed.        Please check in at Admissions on 3/18 at 10:15 am.    Admissions is on the south side of the building.  Access is from the [address] entrance. [employer] parking is available from 5:00 am to 5:00 pm Monday through Friday.  The [employer] service is free of charge. f you use the [employer] service, please make arrangements to retrieve your vehicle prior to 5 pm.  After 5 pm, security must be called to retrieve your keys and may delay your departure.    Please bring a valid photo ID and your insurance card with you.    Your surgery / procedure is scheduled to start at 12:30 pm. Procedure will last approximately 2 hours with 1-2 hours in recovery.     Two people may accompany you when you check in and remain with you until you go for your surgery / procedure. At that time your  may wait in the surgery waiting room or exit the building. We will make sure to have their phone number so they can receive periodic updates and so the doctor can visit with them when the surgery / procedure is finished.       DIETARY CONSIDERATIONS:      To prevent any delays or cancellations of your surgery, Please do not have anything to eat or any milk products  after midnight the night before surgery.  Sips of water up until 8:15am.        +++++++++++++++++++++++++++++++++++++++++++++++++++++++++++++++++++    SHOWER    Please shower the night before surgery and/or the morning of surgery. Dry off with a clean towel. Use [employer] wipes as/if instructed.     If your physician has provided special instructions for showering, follow the instructions that were given to you.     After your shower, please do not use any lotions, sprays, powder, deodorant or makeup on your skin.    Please brush your teeth the morning of surgery.    +++++++++++++++++++++++++++++++++++++++++++++++++++++++++++++++++++    If you are having an outpatient surgery or procedure, you MUST have someone drive you home.  We recommend that someone stay with you for the first 24 hours after your surgery/procedure.    If you are staying overnight in the hospital, visitation is limited to include two (2) visitors in a 24 hour period between the hours of 7:30 am and 7:30 pm.    You will be transferred to your room after approximately 1-2 hours in the recovery room.        Check the [employer] web site ([email or web address]) for the latest visitor policy.       NEED TO KNOW    Leave all jewelry, money, credit cards, and valuables at home.     Please bring cases for your glasses or contacts, dentures, &/or hearing aids.    If you get a cough, cold, fever, etc before your surgery / procedure, notify your doctor's office as soon as possible.    If you'd like, you may bring your Advance Directive (Living Will, Power of  for healthcare). We can scan it into your chart, then return the original back to you.     For any other questions or concerns, please call the Surgery Pre-Admissions department at [phone].  Our office hours are Monday-Friday 8 am - 4:30 pm.  Messages will be returned.

## 2024-03-14 ENCOUNTER — ANESTHESIA (OUTPATIENT)
Dept: CARDIOLOGY | Facility: HOSPITAL | Age: 64
End: 2024-03-14
Payer: OTHER GOVERNMENT

## 2024-03-14 ENCOUNTER — ANESTHESIA EVENT (OUTPATIENT)
Dept: CARDIOLOGY | Facility: HOSPITAL | Age: 64
End: 2024-03-14
Payer: OTHER GOVERNMENT

## 2024-03-14 ENCOUNTER — HOSPITAL ENCOUNTER (OUTPATIENT)
Dept: CARDIOLOGY | Facility: HOSPITAL | Age: 64
Discharge: HOME | End: 2024-03-14
Payer: OTHER GOVERNMENT

## 2024-03-14 ENCOUNTER — LAB (OUTPATIENT)
Dept: LAB | Facility: HOSPITAL | Age: 64
End: 2024-03-14
Payer: OTHER GOVERNMENT

## 2024-03-14 VITALS — HEART RATE: 66 BPM

## 2024-03-14 VITALS
TEMPERATURE: 97.6 F | WEIGHT: 217 LBS | HEART RATE: 55 BPM | HEIGHT: 66 IN | BODY MASS INDEX: 34.87 KG/M2 | OXYGEN SATURATION: 98 % | SYSTOLIC BLOOD PRESSURE: 152 MMHG | RESPIRATION RATE: 20 BRPM | DIASTOLIC BLOOD PRESSURE: 91 MMHG

## 2024-03-14 DIAGNOSIS — I51.89 LEFT ATRIAL MASS: ICD-10-CM

## 2024-03-14 DIAGNOSIS — I51.89 CARDIAC MASS: Primary | ICD-10-CM

## 2024-03-14 LAB
ANION GAP SERPL CALC-SCNC: 8 MMOL/L (ref 3–11)
ASCENDING AORTA: 4.06 CM
AV MEAN GRADIENT: 4.48 MMHG
AV PEAK GRADIENT: 9.61 MMHG
BASOPHILS # BLD AUTO: 0.1 10*3/UL
BASOPHILS NFR BLD AUTO: 1.2 % (ref 0–2)
BSA FOR ECHO PROCEDURE: 2.14 M2
BUN SERPL-MCNC: 22 MG/DL (ref 7–25)
CALCIUM SERPL-MCNC: 10 MG/DL (ref 8.6–10.3)
CHLORIDE SERPL-SCNC: 108 MMOL/L (ref 98–107)
CO2 SERPL-SCNC: 28 MMOL/L (ref 21–32)
CREAT SERPL-MCNC: 0.88 MG/DL (ref 0.6–1.1)
DOP CALC AO PEAK VEL: 1.55 M/S
DOP CALC AO VTI: 30.6 CM
EGFRCR SERPLBLD CKD-EPI 2021: 73 ML/MIN/1.73M*2
EMPTYVELLAA: 94 CM/S
EOSINOPHIL # BLD AUTO: 0.2 10*3/UL
EOSINOPHIL NFR BLD AUTO: 4.4 % (ref 0–3)
ERYTHROCYTE [DISTWIDTH] IN BLOOD BY AUTOMATED COUNT: 14 % (ref 11.5–14)
GLUCOSE SERPL-MCNC: 109 MG/DL (ref 70–105)
HCT VFR BLD AUTO: 41.8 % (ref 34–45)
HGB BLD-MCNC: 14.2 G/DL (ref 11.5–15.5)
LYMPHOCYTES # BLD AUTO: 1.7 10*3/UL
LYMPHOCYTES NFR BLD AUTO: 36.8 % (ref 11–47)
MCH RBC QN AUTO: 30.4 PG (ref 28–33)
MCHC RBC AUTO-ENTMCNC: 34 G/DL (ref 32–36)
MCV RBC AUTO: 89.5 FL (ref 81–97)
MONOCYTES # BLD AUTO: 0.4 10*3/UL
MONOCYTES NFR BLD AUTO: 7.9 % (ref 3–11)
NEUTROPHILS # BLD AUTO: 2.3 10*3/UL
NEUTROPHILS NFR BLD AUTO: 49.7 % (ref 41–81)
NUM OF PULM VEINS: 2
PLATELET # BLD AUTO: 264 10*3/UL (ref 140–350)
PMV BLD AUTO: 8.4 FL (ref 6.9–10.8)
POTASSIUM SERPL-SCNC: 3.8 MMOL/L (ref 3.5–5.1)
RBC # BLD AUTO: 4.67 10*6/ΜL (ref 3.7–5.3)
SODIUM SERPL-SCNC: 144 MMOL/L (ref 135–145)
TR MAX PG: 11.42 MMHG
TRICUSPID VALVE PEAK REGURGITATION VELOCITY: 1.69 M/S
WBC # BLD AUTO: 4.6 10*3/UL (ref 4.5–10.5)

## 2024-03-14 PROCEDURE — (BLANK) HC MAC ANESTHESIA FACILITY CHARGE 1ST 15 MIN: Performed by: PHYSICIAN ASSISTANT

## 2024-03-14 PROCEDURE — 93321 DOPPLER ECHO F-UP/LMTD STD: CPT | Mod: 26 | Performed by: INTERNAL MEDICINE

## 2024-03-14 PROCEDURE — (BLANK) HC RECOVERY PHASE-2 1ST 1/2 HOUR ACUITY LEVEL 2: Performed by: PHYSICIAN ASSISTANT

## 2024-03-14 PROCEDURE — 2580000300 HC RX 258

## 2024-03-14 PROCEDURE — 6360000200 HC RX 636 W HCPCS (ALT 250 FOR IP)

## 2024-03-14 PROCEDURE — (BLANK) HC MAC ANESTHESIA FACILITY CHARGE EACH ADDITIONAL MIN: Performed by: PHYSICIAN ASSISTANT

## 2024-03-14 PROCEDURE — 93312 ECHO TRANSESOPHAGEAL: CPT | Mod: 26 | Performed by: INTERNAL MEDICINE

## 2024-03-14 PROCEDURE — 80048 BASIC METABOLIC PNL TOTAL CA: CPT

## 2024-03-14 PROCEDURE — 93325 DOPPLER ECHO COLOR FLOW MAPG: CPT | Performed by: INTERNAL MEDICINE

## 2024-03-14 PROCEDURE — 36415 COLL VENOUS BLD VENIPUNCTURE: CPT

## 2024-03-14 PROCEDURE — 2500000200 HC RX 250 WO HCPCS: Performed by: INTERNAL MEDICINE

## 2024-03-14 PROCEDURE — 85025 COMPLETE CBC W/AUTO DIFF WBC: CPT

## 2024-03-14 PROCEDURE — 93325 DOPPLER ECHO COLOR FLOW MAPG: CPT | Mod: 26 | Performed by: INTERNAL MEDICINE

## 2024-03-14 PROCEDURE — 01922 ANES N-INVAS IMG/RADJ THER: CPT

## 2024-03-14 RX ORDER — LIDOCAINE HYDROCHLORIDE 20 MG/ML
INJECTION, SOLUTION EPIDURAL; INFILTRATION; INTRACAUDAL; PERINEURAL AS NEEDED
Status: DISCONTINUED | OUTPATIENT
Start: 2024-03-14 | End: 2024-03-14 | Stop reason: SURG

## 2024-03-14 RX ORDER — SODIUM CHLORIDE 9 MG/ML
INJECTION, SOLUTION INTRAVENOUS CONTINUOUS PRN
Status: DISCONTINUED | OUTPATIENT
Start: 2024-03-14 | End: 2024-03-14 | Stop reason: SURG

## 2024-03-14 RX ORDER — LIDOCAINE HYDROCHLORIDE 20 MG/ML
SOLUTION OROPHARYNGEAL CODE/TRAUMA/SEDATION MEDICATION
Status: COMPLETED | OUTPATIENT
Start: 2024-03-14 | End: 2024-03-14

## 2024-03-14 RX ORDER — PROPOFOL 10 MG/ML
INJECTION, EMULSION INTRAVENOUS AS NEEDED
Status: DISCONTINUED | OUTPATIENT
Start: 2024-03-14 | End: 2024-03-14 | Stop reason: SURG

## 2024-03-14 RX ADMIN — SODIUM CHLORIDE: 9 INJECTION, SOLUTION INTRAVENOUS at 08:16

## 2024-03-14 RX ADMIN — LIDOCAINE HYDROCHLORIDE 15 ML: 20 SOLUTION ORAL; TOPICAL at 07:53

## 2024-03-14 RX ADMIN — PROPOFOL 50 MG: 10 INJECTION, EMULSION INTRAVENOUS at 08:25

## 2024-03-14 RX ADMIN — PROPOFOL 50 MG: 10 INJECTION, EMULSION INTRAVENOUS at 08:30

## 2024-03-14 RX ADMIN — PROPOFOL 100 MG: 10 INJECTION, EMULSION INTRAVENOUS at 08:19

## 2024-03-14 RX ADMIN — LIDOCAINE HYDROCHLORIDE 100 MG: 20 INJECTION, SOLUTION EPIDURAL; INFILTRATION; INTRACAUDAL; PERINEURAL at 08:19

## 2024-03-14 NOTE — INTERVAL H&P NOTE
Patient name: Jamir Acevedo  MRN: [id number]  Admit date: 3/14/2024    Patient has a H&P from less than 30 days ago by a member of our [facility] Provider team. This history was reviewed by me for accuracy and was last updated on 03/14/24 by ESTEBAN WALLS.    Patient seen and evaluated today and there are no significant changes from the previous evaluation.       NPO at: Last night  Medications Held: Yes  History of reaction to anesthetics:  No  Contrast Allergy: No  Upcoming Surgery: No  Mallampati class: II (hard and soft palate, upper portion of tonsils anduvula visible)   ASA class: ASA 2 - Patient with mild systemic disease with no functional limitations      Results from last 4 days   Lab Units 03/14/24  0632   WBC AUTO 10*3/uL 4.6   RBC AUTO 10*6/µL 4.67   HEMOGLOBIN g/dL 14.2   HEMATOCRIT % 41.8   PLATELETS AUTO 10*3/uL 264       PHYSICAL EXAM   Temp:  [36.4 °C (97.6 °F)] 36.4 °C (97.6 °F)  Heart Rate:  [59] 59  Resp:  [18] 18  SpO2:  [92 %] 92 %  BP: (145)/(86) 145/86  General: Comfortable not in any acute distress  HEENT: Normocephalic atraumatic  Neck: No significant jugular venous distention   Chest: Clear to auscultation. No rales, wheezes, or rhonchi.  CVS: S1-S2, regular rate and rhythm, no rubs or gallops. No murmurs.   Abdomen: Soft, nontender nondistended, normoactive bowel sounds present  Extremities: No edema   Vascular: Peripheral pulses intact.                        No data recorded       Assessment:  Left atrial mass seen on CT     Plan:  Transesophageal echocardiogram for evaluation      This patient seen in conjunction with ESTEBAN Retana  3/14/2024

## 2024-03-14 NOTE — ANESTHESIA POSTPROCEDURE EVALUATION
Patient: Jamir Acevedo    Procedure Summary       Date: 03/14/24 Room / Location: [facility] Cardiovascular Diagn[facility]    Anesthesia Start: 0816 Anesthesia Stop: 0837    Procedure: US KENDRICK COMPLETE Diagnosis: Left atrial mass    Scheduled Providers: Pascual Pulliam MD; Alison Garrido CRNA; KATY Hussein MD Responsible Provider: KATY Hussein MD    Anesthesia Type: Not recorded ASA Status: Not recorded            Anesthesia Type: No value filed.    Last vitals  Vitals Value Taken Time   /91 03/14/24 0910   Temp     Pulse 55 03/14/24 0910   Resp 20 03/14/24 0910   SpO2 98 % 03/14/24 0910   Pain Score         Anesthesia Post Evaluation    Patient location during evaluation: PACU  Patient participation: complete - patient participated  Level of consciousness: awake and alert  Pain management: adequate  Airway patency: patent  Anesthetic complications: no  Cardiovascular status: acceptable  Respiratory status: acceptable  Hydration status: acceptable  May dismiss recovered patient based on consultation with the appropriate physicians and/or meeting appropriate discharge criteria    Cosmetic?  This procedure is not cosmetic.

## 2024-03-14 NOTE — ANESTHESIA PREPROCEDURE EVALUATION
"Pre-Procedure Assessment    Patient: Jamir Acevedo, female, 63 y.o.    Ht Readings from Last 1 Encounters:   03/14/24 1.676 m (5' 6\")     Wt Readings from Last 1 Encounters:   03/14/24 98.4 kg (217 lb)       Last Vitals  BP      Temp      Pulse     Resp      SpO2      Pain Score         Problem list reviewed and Medical history reviewed    History of anesthetic complications:          Airway   Mallampati: II  TM distance: >3 FB  Neck ROM: full      Dental      Pulmonary     breath sounds clear to auscultation  (+) pneumonia, chronic bronchitis  Cardiovascular   (+) hypertension, valvular problems/murmurs    Rhythm: regular  Rate: normal    Mental Status/Neuro/Psych    Pt is alert.      (+) peripheral neuropathy    GI/Hepatic/Renal    (+) hiatal hernia, liver disease    Endo/Other - negative ROS   Abdominal           Social History     Tobacco Use   • Smoking status: Never   • Smokeless tobacco: Never   Substance Use Topics   • Alcohol use: Not Currently     Comment: Maybe 1 per month even less then that now      Hematology   WBC   Date Value Ref Range Status   03/14/2024 4.6 4.5 - 10.5 10*3/uL Final     RBC   Date Value Ref Range Status   03/14/2024 4.67 3.70 - 5.30 10*6/µL Final     MCV   Date Value Ref Range Status   03/14/2024 89.5 81.0 - 97.0 fL Final     Hemoglobin   Date Value Ref Range Status   03/14/2024 14.2 11.5 - 15.5 g/dL Final     Hematocrit   Date Value Ref Range Status   03/14/2024 41.8 34.0 - 45.0 % Final     Platelets   Date Value Ref Range Status   03/14/2024 264 140 - 350 10*3/uL Final      Coagulation No results found for: \"PT\", \"APTT\", \"INR\"   General Chemistry   Calcium   Date Value Ref Range Status   03/14/2024 10.0 8.6 - 10.3 mg/dL Final     BUN   Date Value Ref Range Status   03/14/2024 22 7 - 25 mg/dL Final     Creatinine   Date Value Ref Range Status   03/14/2024 0.88 0.60 - 1.10 mg/dL Final     Glucose   Date Value Ref Range Status   03/14/2024 109 (H) 70 - 105 mg/dL Final     Sodium "   Date Value Ref Range Status   03/14/2024 144 135 - 145 mmol/L Final     Potassium   Date Value Ref Range Status   03/14/2024 3.8 3.5 - 5.1 MMOL/L Final     CO2   Date Value Ref Range Status   03/14/2024 28 21 - 32 mmol/L Final     Chloride   Date Value Ref Range Status   03/14/2024 108 (H) 98 - 107 mmol/L Final     Anesthesia Plan    ASA 3   NPO status reviewed: > 6 hours    MAC         Induction: intravenous                 Anesthetic plan and risks discussed with patient.  Use of blood products discussed with patient who consented to blood products.

## 2024-03-14 NOTE — ANESTHESIA POSTPROCEDURE EVALUATION
Patient: Jamir Acevedo    Procedure Summary       Date: 03/14/24 Room / Location: [facility] Cardiovascular Diagnostics    Anesthesia Start: 0816 Anesthesia Stop: 0837    Procedure: US KENDRICK COMPLETE Diagnosis: Left atrial mass    Scheduled Providers: Pascual Pulliam MD; Alison Garrido CRNA; KATY Hussein MD Responsible Provider: KATY Hussein MD    Anesthesia Type: MAC ASA Status: 3            Anesthesia Type: MAC    Last vitals  Vitals Value Taken Time   /91 03/14/24 0910   Temp     Pulse 55 03/14/24 0910   Resp 20 03/14/24 0910   SpO2 98 % 03/14/24 0910   Pain Score         Anesthesia Post Evaluation    Patient location during evaluation: PACU  Patient participation: complete - patient participated  Level of consciousness: awake and alert  Pain management: adequate  Airway patency: patent  Anesthetic complications: no  Cardiovascular status: acceptable  Respiratory status: acceptable  Hydration status: acceptable  May dismiss recovered patient based on consultation with the appropriate physicians and/or meeting appropriate discharge criteria    Cosmetic?  This procedure is not cosmetic.

## 2024-03-14 NOTE — DISCHARGE INSTRUCTIONS
YOU MAY EAT AND DRINK AT 10:00 AM.  NO DRIVING FOR 24 HOURS.  YOU CAN TREAT A SORE THROAT WITH OVER THE COUNTER REMEDIES.  [facility] will be reaching out to you for a cardiac MRI to be scheduled.  Then you will have a referral to the [facility] [place] with Dr. Huynh in the future.      MYXOMA

## 2024-03-15 DIAGNOSIS — D15.1 ATRIAL MYXOMA: Primary | ICD-10-CM

## 2024-03-18 ENCOUNTER — ANESTHESIA EVENT (OUTPATIENT)
Dept: OPERATING ROOM | Facility: HOSPITAL | Age: 64
End: 2024-03-18
Payer: OTHER GOVERNMENT

## 2024-03-18 ENCOUNTER — ANESTHESIA (OUTPATIENT)
Dept: OPERATING ROOM | Facility: HOSPITAL | Age: 64
End: 2024-03-18
Payer: OTHER GOVERNMENT

## 2024-03-18 ENCOUNTER — HOSPITAL ENCOUNTER (OUTPATIENT)
Facility: HOSPITAL | Age: 64
Setting detail: OUTPATIENT SURGERY
Discharge: HOME | End: 2024-03-18
Attending: SURGERY | Admitting: SURGERY
Payer: OTHER GOVERNMENT

## 2024-03-18 VITALS
OXYGEN SATURATION: 98 % | BODY MASS INDEX: 34.9 KG/M2 | TEMPERATURE: 97.1 F | WEIGHT: 217.15 LBS | RESPIRATION RATE: 16 BRPM | SYSTOLIC BLOOD PRESSURE: 161 MMHG | HEIGHT: 66 IN | HEART RATE: 52 BPM | DIASTOLIC BLOOD PRESSURE: 98 MMHG

## 2024-03-18 DIAGNOSIS — K80.20 GALLSTONES: Primary | ICD-10-CM

## 2024-03-18 PROCEDURE — 88304 TISSUE EXAM BY PATHOLOGIST: CPT

## 2024-03-18 PROCEDURE — 6360000200 HC RX 636 W HCPCS (ALT 250 FOR IP): Performed by: STUDENT IN AN ORGANIZED HEALTH CARE EDUCATION/TRAINING PROGRAM

## 2024-03-18 PROCEDURE — (BLANK) HC RECOVERY PHASE-2 EACH ADDITIONAL 1/2 HOUR ACUITY LEVEL 2: Performed by: SURGERY

## 2024-03-18 PROCEDURE — 2500000200 HC RX 250 WO HCPCS: Performed by: STUDENT IN AN ORGANIZED HEALTH CARE EDUCATION/TRAINING PROGRAM

## 2024-03-18 PROCEDURE — (BLANK) HC GENERAL ANESTHESIA FACILITY CHARGE EACH ADDITIONAL MIN: Performed by: SURGERY

## 2024-03-18 PROCEDURE — (BLANK) HC OR LEVEL 4 PROC 1ST 15MIN: Performed by: SURGERY

## 2024-03-18 PROCEDURE — (BLANK) HC RECOVERY PHASE-1 1ST  HOUR ACUITY LEVEL 3: Performed by: SURGERY

## 2024-03-18 PROCEDURE — 2720000000 HC SUPP 272 WO HCPCS: Performed by: SURGERY

## 2024-03-18 PROCEDURE — 6360000200 HC RX 636 W HCPCS (ALT 250 FOR IP): Performed by: SURGERY

## 2024-03-18 PROCEDURE — 2580000300 HC RX 258: Performed by: SURGERY

## 2024-03-18 PROCEDURE — (BLANK) HC OR LEVEL 4 PROC EACH ADDITIONAL MIN: Performed by: SURGERY

## 2024-03-18 PROCEDURE — (BLANK) HC GENERAL ANESTHESIA FACILITY CHARGE 1ST 15 MIN: Performed by: SURGERY

## 2024-03-18 PROCEDURE — 47562 LAPAROSCOPIC CHOLECYSTECTOMY: CPT | Performed by: SURGERY

## 2024-03-18 PROCEDURE — (BLANK) HC RECOVERY PHASE-2 1ST 1/2 HOUR ACUITY LEVEL 2: Performed by: SURGERY

## 2024-03-18 PROCEDURE — 2580000300 HC RX 258: Performed by: STUDENT IN AN ORGANIZED HEALTH CARE EDUCATION/TRAINING PROGRAM

## 2024-03-18 PROCEDURE — 2500000200 HC RX 250 WO HCPCS: Performed by: SURGERY

## 2024-03-18 PROCEDURE — 00790 ANES IPER UPR ABD NOS: CPT

## 2024-03-18 RX ORDER — FAMOTIDINE 20 MG/1
20 TABLET, FILM COATED ORAL 2 TIMES DAILY
Status: DISCONTINUED | OUTPATIENT
Start: 2024-03-18 | End: 2024-03-18 | Stop reason: HOSPADM

## 2024-03-18 RX ORDER — PROMETHAZINE HYDROCHLORIDE 25 MG/ML
12.5 INJECTION, SOLUTION INTRAMUSCULAR; INTRAVENOUS ONCE
Status: COMPLETED | OUTPATIENT
Start: 2024-03-18 | End: 2024-03-18

## 2024-03-18 RX ORDER — PROPOFOL 10 MG/ML
INJECTION, EMULSION INTRAVENOUS CONTINUOUS PRN
Status: DISCONTINUED | OUTPATIENT
Start: 2024-03-18 | End: 2024-03-18 | Stop reason: SURG

## 2024-03-18 RX ORDER — PROMETHAZINE HYDROCHLORIDE 25 MG/1
25 SUPPOSITORY RECTAL EVERY 6 HOURS PRN
Status: DISCONTINUED | OUTPATIENT
Start: 2024-03-18 | End: 2024-03-18 | Stop reason: HOSPADM

## 2024-03-18 RX ORDER — OXYCODONE HYDROCHLORIDE 5 MG/1
10 TABLET ORAL EVERY 4 HOURS PRN
Status: DISCONTINUED | OUTPATIENT
Start: 2024-03-18 | End: 2024-03-18 | Stop reason: HOSPADM

## 2024-03-18 RX ORDER — HYDROMORPHONE HYDROCHLORIDE 1 MG/ML
0.2 INJECTION, SOLUTION INTRAMUSCULAR; INTRAVENOUS; SUBCUTANEOUS
Status: DISCONTINUED | OUTPATIENT
Start: 2024-03-18 | End: 2024-03-18 | Stop reason: HOSPADM

## 2024-03-18 RX ORDER — HYDROMORPHONE HYDROCHLORIDE 1 MG/ML
0.5 INJECTION, SOLUTION INTRAMUSCULAR; INTRAVENOUS; SUBCUTANEOUS EVERY 5 MIN PRN
Status: DISCONTINUED | OUTPATIENT
Start: 2024-03-18 | End: 2024-03-18 | Stop reason: HOSPADM

## 2024-03-18 RX ORDER — ROCURONIUM BROMIDE 10 MG/ML
INJECTION, SOLUTION INTRAVENOUS AS NEEDED
Status: DISCONTINUED | OUTPATIENT
Start: 2024-03-18 | End: 2024-03-18 | Stop reason: SURG

## 2024-03-18 RX ORDER — DIPHENHYDRAMINE HYDROCHLORIDE 50 MG/ML
25 INJECTION INTRAMUSCULAR; INTRAVENOUS ONCE AS NEEDED
Status: DISCONTINUED | OUTPATIENT
Start: 2024-03-18 | End: 2024-03-18 | Stop reason: HOSPADM

## 2024-03-18 RX ORDER — SODIUM CHLORIDE 0.9 G/100ML
INJECTION, SOLUTION IRRIGATION AS NEEDED
Status: DISCONTINUED | OUTPATIENT
Start: 2024-03-18 | End: 2024-03-18 | Stop reason: HOSPADM

## 2024-03-18 RX ORDER — SODIUM CHLORIDE 0.9 % (FLUSH) 0.9 %
2 SYRINGE (ML) INJECTION AS NEEDED
Status: DISCONTINUED | OUTPATIENT
Start: 2024-03-18 | End: 2024-03-18 | Stop reason: HOSPADM

## 2024-03-18 RX ORDER — ACETAMINOPHEN 325 MG/1
650 TABLET ORAL EVERY 4 HOURS PRN
Qty: 50 TABLET | Refills: 1 | Status: SHIPPED | OUTPATIENT
Start: 2024-03-18 | End: 2024-04-29

## 2024-03-18 RX ORDER — ESMOLOL HYDROCHLORIDE 10 MG/ML
INJECTION INTRAVENOUS AS NEEDED
Status: DISCONTINUED | OUTPATIENT
Start: 2024-03-18 | End: 2024-03-18

## 2024-03-18 RX ORDER — DEXAMETHASONE SODIUM PHOSPHATE 4 MG/ML
4 INJECTION, SOLUTION INTRA-ARTICULAR; INTRALESIONAL; INTRAMUSCULAR; INTRAVENOUS; SOFT TISSUE ONCE AS NEEDED
Status: COMPLETED | OUTPATIENT
Start: 2024-03-18 | End: 2024-03-18

## 2024-03-18 RX ORDER — ONDANSETRON HYDROCHLORIDE 2 MG/ML
4 INJECTION, SOLUTION INTRAVENOUS ONCE
Status: COMPLETED | OUTPATIENT
Start: 2024-03-18 | End: 2024-03-18

## 2024-03-18 RX ORDER — ACETAMINOPHEN 500 MG
1000 TABLET ORAL ONCE
Status: COMPLETED | OUTPATIENT
Start: 2024-03-18 | End: 2024-03-18

## 2024-03-18 RX ORDER — ADHESIVE BANDAGE
30 BANDAGE TOPICAL 2 TIMES DAILY PRN
Qty: 120 ML | Refills: 1 | Status: SHIPPED | OUTPATIENT
Start: 2024-03-18 | End: 2024-04-29 | Stop reason: ALTCHOICE

## 2024-03-18 RX ORDER — PROPOFOL 10 MG/ML
INJECTION, EMULSION INTRAVENOUS AS NEEDED
Status: DISCONTINUED | OUTPATIENT
Start: 2024-03-18 | End: 2024-03-18 | Stop reason: SURG

## 2024-03-18 RX ORDER — METOPROLOL TARTRATE 1 MG/ML
1 INJECTION, SOLUTION INTRAVENOUS EVERY 5 MIN PRN
Status: DISCONTINUED | OUTPATIENT
Start: 2024-03-18 | End: 2024-03-18 | Stop reason: HOSPADM

## 2024-03-18 RX ORDER — OXYCODONE HYDROCHLORIDE 5 MG/1
5 TABLET ORAL EVERY 4 HOURS PRN
Qty: 7 TABLET | Refills: 0 | Status: SHIPPED | OUTPATIENT
Start: 2024-03-18 | End: 2024-03-21

## 2024-03-18 RX ORDER — FENTANYL CITRATE/PF 50 MCG/ML
PLASTIC BAG, INJECTION (ML) INTRAVENOUS AS NEEDED
Status: DISCONTINUED | OUTPATIENT
Start: 2024-03-18 | End: 2024-03-18 | Stop reason: SURG

## 2024-03-18 RX ORDER — ESMOLOL HYDROCHLORIDE 10 MG/ML
INJECTION INTRAVENOUS AS NEEDED
Status: DISCONTINUED | OUTPATIENT
Start: 2024-03-18 | End: 2024-03-18 | Stop reason: SURG

## 2024-03-18 RX ORDER — SODIUM CHLORIDE, SODIUM LACTATE, POTASSIUM CHLORIDE, CALCIUM CHLORIDE 600; 310; 30; 20 MG/100ML; MG/100ML; MG/100ML; MG/100ML
100 INJECTION, SOLUTION INTRAVENOUS CONTINUOUS
Status: DISCONTINUED | OUTPATIENT
Start: 2024-03-18 | End: 2024-03-18 | Stop reason: HOSPADM

## 2024-03-18 RX ORDER — MIDAZOLAM HYDROCHLORIDE 1 MG/ML
1 INJECTION INTRAMUSCULAR; INTRAVENOUS EVERY 5 MIN PRN
Status: DISCONTINUED | OUTPATIENT
Start: 2024-03-18 | End: 2024-03-18 | Stop reason: HOSPADM

## 2024-03-18 RX ORDER — ADHESIVE BANDAGE
30 BANDAGE TOPICAL 2 TIMES DAILY PRN
Status: DISCONTINUED | OUTPATIENT
Start: 2024-03-18 | End: 2024-03-18 | Stop reason: HOSPADM

## 2024-03-18 RX ORDER — OXYCODONE HYDROCHLORIDE 5 MG/1
5 TABLET ORAL EVERY 4 HOURS PRN
Status: DISCONTINUED | OUTPATIENT
Start: 2024-03-18 | End: 2024-03-18 | Stop reason: HOSPADM

## 2024-03-18 RX ORDER — LIDOCAINE HYDROCHLORIDE 20 MG/ML
INJECTION, SOLUTION EPIDURAL; INFILTRATION; INTRACAUDAL; PERINEURAL AS NEEDED
Status: DISCONTINUED | OUTPATIENT
Start: 2024-03-18 | End: 2024-03-18 | Stop reason: SURG

## 2024-03-18 RX ORDER — SODIUM CHLORIDE 0.9 % (FLUSH) 0.9 %
2 SYRINGE (ML) INJECTION EVERY 8 HOURS SCHEDULED
Status: DISCONTINUED | OUTPATIENT
Start: 2024-03-18 | End: 2024-03-18 | Stop reason: HOSPADM

## 2024-03-18 RX ORDER — DIPHENHYDRAMINE HYDROCHLORIDE 50 MG/ML
12.5 INJECTION INTRAMUSCULAR; INTRAVENOUS EVERY 6 HOURS PRN
Status: DISCONTINUED | OUTPATIENT
Start: 2024-03-18 | End: 2024-03-18 | Stop reason: HOSPADM

## 2024-03-18 RX ORDER — BUPIVACAINE HYDROCHLORIDE 5 MG/ML
INJECTION, SOLUTION EPIDURAL; INTRACAUDAL AS NEEDED
Status: DISCONTINUED | OUTPATIENT
Start: 2024-03-18 | End: 2024-03-18 | Stop reason: HOSPADM

## 2024-03-18 RX ORDER — IBUPROFEN 200 MG
400 TABLET ORAL EVERY 4 HOURS PRN
Status: DISCONTINUED | OUTPATIENT
Start: 2024-03-18 | End: 2024-03-18 | Stop reason: HOSPADM

## 2024-03-18 RX ORDER — DEXAMETHASONE SODIUM PHOSPHATE 4 MG/ML
4 INJECTION, SOLUTION INTRA-ARTICULAR; INTRALESIONAL; INTRAMUSCULAR; INTRAVENOUS; SOFT TISSUE ONCE
Status: COMPLETED | OUTPATIENT
Start: 2024-03-18 | End: 2024-03-18

## 2024-03-18 RX ORDER — DOCUSATE SODIUM 100 MG/1
100 CAPSULE, LIQUID FILLED ORAL 2 TIMES DAILY
Qty: 50 CAPSULE | Refills: 1 | Status: SHIPPED | OUTPATIENT
Start: 2024-03-18 | End: 2024-04-29 | Stop reason: ALTCHOICE

## 2024-03-18 RX ORDER — DOCUSATE SODIUM 100 MG/1
100 CAPSULE, LIQUID FILLED ORAL 2 TIMES DAILY
Status: DISCONTINUED | OUTPATIENT
Start: 2024-03-18 | End: 2024-03-18 | Stop reason: HOSPADM

## 2024-03-18 RX ORDER — ONDANSETRON 4 MG/1
4 TABLET, ORALLY DISINTEGRATING ORAL EVERY 6 HOURS PRN
Status: DISCONTINUED | OUTPATIENT
Start: 2024-03-18 | End: 2024-03-18 | Stop reason: HOSPADM

## 2024-03-18 RX ORDER — ONDANSETRON 4 MG/1
4 TABLET, ORALLY DISINTEGRATING ORAL EVERY 6 HOURS PRN
Qty: 3 TABLET | Refills: 2 | Status: SHIPPED | OUTPATIENT
Start: 2024-03-18 | End: 2024-04-29 | Stop reason: ALTCHOICE

## 2024-03-18 RX ORDER — TALC
3 POWDER (GRAM) TOPICAL NIGHTLY PRN
Status: DISCONTINUED | OUTPATIENT
Start: 2024-03-18 | End: 2024-03-18 | Stop reason: HOSPADM

## 2024-03-18 RX ORDER — DEXTROSE MONOHYDRATE, SODIUM CHLORIDE, AND POTASSIUM CHLORIDE 50; 1.49; 4.5 G/1000ML; G/1000ML; G/1000ML
75 INJECTION, SOLUTION INTRAVENOUS CONTINUOUS
Status: DISCONTINUED | OUTPATIENT
Start: 2024-03-18 | End: 2024-03-18 | Stop reason: HOSPADM

## 2024-03-18 RX ORDER — SODIUM CHLORIDE 9 MG/ML
10 INJECTION, SOLUTION INTRAVENOUS CONTINUOUS
Status: DISCONTINUED | OUTPATIENT
Start: 2024-03-18 | End: 2024-03-18 | Stop reason: HOSPADM

## 2024-03-18 RX ORDER — ONDANSETRON HYDROCHLORIDE 2 MG/ML
4 INJECTION, SOLUTION INTRAVENOUS EVERY 6 HOURS PRN
Status: DISCONTINUED | OUTPATIENT
Start: 2024-03-18 | End: 2024-03-18 | Stop reason: HOSPADM

## 2024-03-18 RX ORDER — METOCLOPRAMIDE HYDROCHLORIDE 5 MG/ML
10 INJECTION INTRAMUSCULAR; INTRAVENOUS EVERY 6 HOURS PRN
Status: DISCONTINUED | OUTPATIENT
Start: 2024-03-18 | End: 2024-03-18 | Stop reason: HOSPADM

## 2024-03-18 RX ORDER — SIMETHICONE 80 MG
160 TABLET,CHEWABLE ORAL EVERY 4 HOURS PRN
Status: DISCONTINUED | OUTPATIENT
Start: 2024-03-18 | End: 2024-03-18 | Stop reason: HOSPADM

## 2024-03-18 RX ORDER — FAMOTIDINE 10 MG/ML
20 INJECTION INTRAVENOUS 2 TIMES DAILY
Status: DISCONTINUED | OUTPATIENT
Start: 2024-03-18 | End: 2024-03-18 | Stop reason: HOSPADM

## 2024-03-18 RX ORDER — IBUPROFEN 200 MG
400 TABLET ORAL EVERY 4 HOURS PRN
Qty: 50 TABLET | Refills: 0 | Status: SHIPPED | OUTPATIENT
Start: 2024-03-18 | End: 2024-03-28

## 2024-03-18 RX ORDER — ONDANSETRON HYDROCHLORIDE 2 MG/ML
4 INJECTION, SOLUTION INTRAVENOUS ONCE AS NEEDED
Status: COMPLETED | OUTPATIENT
Start: 2024-03-18 | End: 2024-03-18

## 2024-03-18 RX ORDER — ACETAMINOPHEN 325 MG/1
650 TABLET ORAL EVERY 4 HOURS PRN
Status: DISCONTINUED | OUTPATIENT
Start: 2024-03-18 | End: 2024-03-18 | Stop reason: HOSPADM

## 2024-03-18 RX ORDER — FENTANYL CITRATE/PF 50 MCG/ML
50 PLASTIC BAG, INJECTION (ML) INTRAVENOUS EVERY 5 MIN PRN
Status: DISCONTINUED | OUTPATIENT
Start: 2024-03-18 | End: 2024-03-18 | Stop reason: HOSPADM

## 2024-03-18 RX ADMIN — LIDOCAINE HYDROCHLORIDE 60 MG: 20 INJECTION, SOLUTION EPIDURAL; INFILTRATION; INTRACAUDAL; PERINEURAL at 12:28

## 2024-03-18 RX ADMIN — PROPOFOL 150 MG: 10 INJECTION, EMULSION INTRAVENOUS at 12:28

## 2024-03-18 RX ADMIN — ROCURONIUM BROMIDE 10 MG: 10 INJECTION INTRAVENOUS at 12:41

## 2024-03-18 RX ADMIN — FENTANYL CITRATE 50 MCG: 50 INJECTION, SOLUTION INTRAMUSCULAR; INTRAVENOUS at 12:28

## 2024-03-18 RX ADMIN — ONDANSETRON 4 MG: 2 INJECTION INTRAMUSCULAR; INTRAVENOUS at 14:20

## 2024-03-18 RX ADMIN — ROCURONIUM BROMIDE 50 MG: 10 INJECTION INTRAVENOUS at 12:28

## 2024-03-18 RX ADMIN — PROMETHAZINE HYDROCHLORIDE 12.5 MG: 25 INJECTION INTRAMUSCULAR; INTRAVENOUS at 15:26

## 2024-03-18 RX ADMIN — SODIUM CHLORIDE, POTASSIUM CHLORIDE, SODIUM LACTATE AND CALCIUM CHLORIDE: 600; 310; 30; 20 INJECTION, SOLUTION INTRAVENOUS at 12:23

## 2024-03-18 RX ADMIN — HYDROMORPHONE HYDROCHLORIDE 0.5 MG: 1 INJECTION, SOLUTION INTRAMUSCULAR; INTRAVENOUS; SUBCUTANEOUS at 14:05

## 2024-03-18 RX ADMIN — PROPOFOL 20 MG: 10 INJECTION, EMULSION INTRAVENOUS at 12:50

## 2024-03-18 RX ADMIN — HYDROMORPHONE HYDROCHLORIDE 0.5 MG: 1 INJECTION, SOLUTION INTRAMUSCULAR; INTRAVENOUS; SUBCUTANEOUS at 14:17

## 2024-03-18 RX ADMIN — ESMOLOL HYDROCHLORIDE 10 MG: 10 INJECTION, SOLUTION INTRAVENOUS at 13:04

## 2024-03-18 RX ADMIN — CEFAZOLIN 2000 MG: 2 INJECTION, POWDER, FOR SOLUTION INTRAMUSCULAR; INTRAVENOUS at 12:32

## 2024-03-18 RX ADMIN — FENTANYL CITRATE 50 MCG: 50 INJECTION, SOLUTION INTRAMUSCULAR; INTRAVENOUS at 12:54

## 2024-03-18 RX ADMIN — PROPOFOL 30 MG: 10 INJECTION, EMULSION INTRAVENOUS at 12:44

## 2024-03-18 RX ADMIN — FENTANYL CITRATE 25 MCG: 50 INJECTION, SOLUTION INTRAMUSCULAR; INTRAVENOUS at 12:46

## 2024-03-18 RX ADMIN — SUGAMMADEX 200 MG: 100 INJECTION, SOLUTION INTRAVENOUS at 13:28

## 2024-03-18 RX ADMIN — PROPOFOL 50 MG: 10 INJECTION, EMULSION INTRAVENOUS at 13:01

## 2024-03-18 RX ADMIN — DEXAMETHASONE SODIUM PHOSPHATE 4 MG: 4 INJECTION, SOLUTION INTRA-ARTICULAR; INTRALESIONAL; INTRAMUSCULAR; INTRAVENOUS; SOFT TISSUE at 14:22

## 2024-03-18 RX ADMIN — FENTANYL CITRATE 25 MCG: 50 INJECTION, SOLUTION INTRAMUSCULAR; INTRAVENOUS at 12:41

## 2024-03-18 RX ADMIN — DEXAMETHASONE SODIUM PHOSPHATE 4 MG: 4 INJECTION, SOLUTION INTRA-ARTICULAR; INTRALESIONAL; INTRAMUSCULAR; INTRAVENOUS; SOFT TISSUE at 11:18

## 2024-03-18 RX ADMIN — PROPOFOL 30 MCG/KG/MIN: 10 INJECTION, EMULSION INTRAVENOUS at 12:33

## 2024-03-18 RX ADMIN — Medication 1000 MG: at 11:06

## 2024-03-18 RX ADMIN — ONDANSETRON 4 MG: 2 INJECTION INTRAMUSCULAR; INTRAVENOUS at 11:18

## 2024-03-18 RX ADMIN — FENTANYL CITRATE 25 MCG: 50 INJECTION, SOLUTION INTRAMUSCULAR; INTRAVENOUS at 13:04

## 2024-03-18 NOTE — ANESTHESIA PREPROCEDURE EVALUATION
"Pre-Procedure Assessment    Patient: Jamir Acevedo, female, 63 y.o.    Ht Readings from Last 1 Encounters:   03/18/24 1.676 m (5' 6\")     Wt Readings from Last 1 Encounters:   03/18/24 98.5 kg (217 lb 2.5 oz)       Last Vitals  /92 (03/18/24 1049)    Temp 37 °C (98.6 °F) (03/18/24 1049)    Pulse 57 (03/18/24 1049)   Resp 16 (03/18/24 1049)    SpO2 97 % (03/18/24 1049)    Pain Score 0-No pain (03/18/24 1049)       Problem list reviewed and Medical history reviewed    History of anesthetic complications: PONV   No family history of anesthetic complications:      Airway   Mallampati: II  TM distance: >3 FB  Neck ROM: full      Dental - normal exam     Pulmonary     breath sounds clear to auscultation  (+) pneumonia, chronic bronchitis  Cardiovascular   (+) hypertension, valvular problems/murmurs AS    Rhythm: regular  Rate: normal  ROS comment: · Transesophageal echocardiograms performed to assess left atrial mass.  · Left ventricle is normal in size.  Normal systolic function with visual ejection fraction between 55 to 60%.  Normal wall motion.  · Right ventricle is normal in size with normal systolic function.  · Biatria normal in size.  · There is presence of 1.4 X 1.86 cm oval circumscribed mass with circumference area of 1.83 cm2 attached to intra-atrial septum near fossa ovalis.  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  · Aortic valve is bicuspid.  Mild aortic stenosis based on transthoracic echocardiogram.  Trace aortic regurgitation.  · Ascending aorta mildly dilated with max temperature of 4.06 cm.  · Trace mitral and tricuspid regurgitation.  · No evidence of pericardial effusion.      Mental Status/Neuro/Psych - negative ROS   Pt is alert.      (+) peripheral neuropathy    GI/Hepatic/Renal    (+) hiatal hernia, liver disease    Endo/Other - negative ROS   Abdominal             Social History     Tobacco Use    Smoking status: Never    Smokeless tobacco: Never   Substance Use Topics    Alcohol use: Not Currently " "    Comment: Maybe 1 per month even less then that now      Hematology   WBC   Date Value Ref Range Status   03/14/2024 4.6 4.5 - 10.5 10*3/uL Final     RBC   Date Value Ref Range Status   03/14/2024 4.67 3.70 - 5.30 10*6/µL Final     MCV   Date Value Ref Range Status   03/14/2024 89.5 81.0 - 97.0 fL Final     Hemoglobin   Date Value Ref Range Status   03/14/2024 14.2 11.5 - 15.5 g/dL Final     Hematocrit   Date Value Ref Range Status   03/14/2024 41.8 34.0 - 45.0 % Final     Platelets   Date Value Ref Range Status   03/14/2024 264 140 - 350 10*3/uL Final      Coagulation No results found for: \"PT\", \"APTT\", \"INR\"   General Chemistry   Calcium   Date Value Ref Range Status   03/14/2024 10.0 8.6 - 10.3 mg/dL Final     BUN   Date Value Ref Range Status   03/14/2024 22 7 - 25 mg/dL Final     Creatinine   Date Value Ref Range Status   03/14/2024 0.88 0.60 - 1.10 mg/dL Final     Glucose   Date Value Ref Range Status   03/14/2024 109 (H) 70 - 105 mg/dL Final     Sodium   Date Value Ref Range Status   03/14/2024 144 135 - 145 mmol/L Final     Potassium   Date Value Ref Range Status   03/14/2024 3.8 3.5 - 5.1 MMOL/L Final     CO2   Date Value Ref Range Status   03/14/2024 28 21 - 32 mmol/L Final     Chloride   Date Value Ref Range Status   03/14/2024 108 (H) 98 - 107 mmol/L Final     Anesthesia Plan    ASA 3   NPO status reviewed: > 8 hours    General         Induction: intravenous   Airway Planning: oral ET tube          Plan for postoperative opioid use.    Anesthetic plan and risks discussed with patient.      Plan discussed with CRNA.                  "

## 2024-03-18 NOTE — OP NOTE
General Surgery Operative Note    03/18/24    Jamir Acevedo 63 y.o. female    PREOPERATIVE DIAGNOSIS:  Pre-op Diagnosis     * Biliary calculus of other site without obstruction [K80.80]    POSTOPERATIVE DIAGNOSIS: Same    PROCEDURES: Laparoscopic cholecystectomy    SURGEON: PAULA YEPEZ    ASSISTANT: Juli Godinez CFA      DESCRIPTION OF PROCEDURE:  A full timeout was done and antibiotics were confirmed.  The patient was prepped and draped in the usual sterile manner.  The 3 trochars were placed in the routine fashion.  Pneumoperitoneum created without difficulty and all superficial laparoscopy was normal initially and at the completion.  There were no adhesions from the prior umbilical hernia repair.  Superficial laparoscopy look grossly normal.  Liver functions were normal preoperatively and bile duct size was normal preoperatively.  The gallbladder was elevated up and out of the field.  Adhesions were taken down intermittently from the length of the gallbladder which were of minimal capillary significance.  The cystic duct was carefully dissected and the critical view was obtained.  The cystic duct was triply clipped proximally and singly distally and transected.  The cystic artery was treated similarly.  It was a small cystic artery hook cautery was used to remove the gallbladder from the subhepatic space with fairly good hemostasis.  There was an acute inflammatory plane where this peeled off more than it could be cauterized up but at least we did not spill bile or get into the bag.  We did extensive cautery on a higher fulguration current of the entire gallbladder bed.  In the center gallbladder bed there was a deep possibly small venous branch subcapsular which was EXTR cauterized and extra charred to the point where there was no further bleeding.  We checked and rechecked this we irrigated it and manipulated it.  No further bleeding.  We used half of a Surgicel sheet in the gallbladder bed and  packed the omentum up underneath that to hold it in place for further minor hemostasis if needed postoperatively.  No Toradol is given.  The gallbladder was removed with an Endo Catch bag.  Minor spreading of the fascia.  There was external bile spill but not into the wound or into the abdomen.  Gloves were changed minor irrigation and bleeding was suctioned free.  Hemostasis was complete.  The epigastric port site was closed with 0 Vicryl sutures x 2 with the cone closure device and 3-0 Vicryl in the subcu there.  Skin was closed with 4-0 Vicryl subcuticular sutures.  Routinely Dermabond seal of all skin incisions.  Minimal blood loss.  No complications appreciated.  Outpatient discharge is anticipated.    DRAINS: None - hemostasis was totally complete.  See above and below    BLOOD LOSS:  Less than 50 cc.  No evidence of ongoing bleeding.  Surgicel sheet left in gallbladder bed that was well hemostased.    Copy: Dr. Jacinta Sheffield, [place] office      LYNNETTE THOMPSON [facility]  3/18/2024  1:34 PM

## 2024-03-18 NOTE — ANESTHESIA POSTPROCEDURE EVALUATION
Patient: Jamir Acevedo    Procedure Summary       Date: 03/18/24 Room / Location: [facility] OR 04 / [facility] OR    Anesthesia Start: 1223 Anesthesia Stop: 1350    Procedure: LAPAROSCOPIC CHOLECYSTECTOMY (Abdomen) Diagnosis:       Biliary calculus of other site without obstruction      (Biliary calculus of other site without obstruction [K80.80])    Surgeons: Michel Beltran MD Responsible Provider: Luis Pulliam MD    Anesthesia Type: general ASA Status: 3            Anesthesia Type: general    Last vitals  Vitals Value Taken Time   /98 03/18/24 1445   Temp 36.2 °C (97.1 °F) 03/18/24 1445   Pulse 52 03/18/24 1445   Resp 16 03/18/24 1445   SpO2 98 % 03/18/24 1445   Pain Score 7 03/18/24 1445       Anesthesia Post Evaluation    Patient location during evaluation: PACU  Patient participation: complete - patient participated  Level of consciousness: awake and alert  Pain management: adequate  Airway patency: patent  Anesthetic complications: no  Cardiovascular status: acceptable  Respiratory status: acceptable  Hydration status: acceptable  May dismiss recovered patient based on consultation with the appropriate physicians and/or meeting appropriate discharge criteria      Cosmetic?  This procedure is not cosmetic.

## 2024-03-18 NOTE — PERIOPERATIVE NURSING NOTE
Patient and family was given written and verbal discharge instructions before discharge and verbalized understanding. All questions answered at this time.

## 2024-03-19 ENCOUNTER — TELEPHONE (OUTPATIENT)
Dept: CARDIOLOGY | Facility: CLINIC | Age: 64
End: 2024-03-19
Payer: OTHER GOVERNMENT

## 2024-03-19 ENCOUNTER — TELEPHONE (OUTPATIENT)
Dept: CARDIOTHORACIC SURGERY | Facility: CLINIC | Age: 64
End: 2024-03-19
Payer: OTHER GOVERNMENT

## 2024-03-19 DIAGNOSIS — R06.02 SOB (SHORTNESS OF BREATH): ICD-10-CM

## 2024-03-19 DIAGNOSIS — I35.9 AORTIC VALVE DEFECT: Primary | ICD-10-CM

## 2024-03-19 NOTE — TELEPHONE ENCOUNTER
Nurse from CT surgery called stated patient will need a LHC prior to seeing Dr. Huynh as she has a bicuspid valve, need to see if she may need a CABG as well.  Could this be ordered?

## 2024-03-19 NOTE — TELEPHONE ENCOUNTER
Received referral for patient to see Dr. Huynh for Atrial Myxoma.  Received orders from Cher Gillis CNP.    Called and spoke with patient.  She is informed that we are wanting to have her come to the clinic and see Dr. Huynh before she has the MR Cardiac Morphology.  She is informed of the other testing that he is requesting.  She agrees to the testing and an appointment to see Dr. Huynh on 4/1/24, at 2:15.  She is informed that I will have the orders for the tests sent through our authorization team so that they are approved by the insurance.  She voices understanding.  She states that on 4/1/24, her medicare starts & her  becomes  for Life.  She is informed that I will let our authorization team know.

## 2024-03-20 ENCOUNTER — TELEPHONE (OUTPATIENT)
Dept: CARDIOTHORACIC SURGERY | Facility: CLINIC | Age: 64
End: 2024-03-20
Payer: OTHER GOVERNMENT

## 2024-03-20 ENCOUNTER — TELEPHONE (OUTPATIENT)
Dept: ADMINISTRATIVE | Facility: HOSPITAL | Age: 64
End: 2024-03-20
Payer: OTHER GOVERNMENT

## 2024-03-20 ENCOUNTER — TELEPHONE (OUTPATIENT)
Dept: CARDIOLOGY | Facility: CLINIC | Age: 64
End: 2024-03-20
Payer: OTHER GOVERNMENT

## 2024-03-20 NOTE — TELEPHONE ENCOUNTER
Received message that patient would like a call, she had some questions.    Called and spoke with patient.  She inquired about her tests, and authorizations.  She inquired about having family members donate blood for her surgery.  Her questions are answered.  She has no further questions at this time.

## 2024-03-20 NOTE — TELEPHONE ENCOUNTER
You are scheduled for a  CTA abdomen/pelvis with IV contrast as well as carotid and lower extremity vein mapping ultrasounds.     Arrive at the main entrance of [facility] on 3/27/24 at 2:15 PM. Please use the south entrance off of [address].    Your scan will begin at 2:30 PM.    Nothing to eat or drink 4 hours prior to procedure to time. You may take your BP or heart medications with sips of water.     Please drink  a couple of extra glasses of water the day before and day of your scan to help your kidneys filter the contrast dye used for the scan.    When the scan is complete you will have carotid and lower extremity vein mapping ultrasounds.    You are scheduled for a pulmonary function test on 4/5/24.    Arrive at the main entrance of [facility] on 4/5/24 at 1:45 PM. Please enter the south entrance off of [address].    No smoking, inhaler use, or nebulizer treatments for 8 hours prior to the test.      Patient stated understanding and repeated back instructions.

## 2024-03-20 NOTE — LETTER
[facility] [id number]  Dept: [phone]  March 20, 2024    Jamir Acevedo  [address] [place] [address]      Dear Ms. Acevedo:    You are scheduled for a  CTA abdomen/pelvis with IV contrast as well as carotid and lower extremity vein mapping ultrasounds.     Arrive at the main entrance of [facility] on 3/27/24 at 2:15 PM. Please use the south entrance off of [address].    Your scan will begin at 2:30 PM.    Nothing to eat or drink 4 hours prior to procedure to time. You may take your BP or heart medications with sips of water.     Please drink  a couple of extra glasses of water the day before and day of your scan to help your kidneys filter the contrast dye used for the scan.    When the scan is complete you will have carotid and lower extremity vein mapping ultrasounds.      You are scheduled for a pulmonary function test on 4/5/24.    Arrive at the main entrance of [facility] on 4/5/24 at 1:45 PM. Please enter the south entrance off of [address]. You do not need to fast for this test.    No smoking, inhaler use, or nebulizer treatments for 8 hours prior to the test.    If you have any questions or concerns, please don't hesitate to call me at [phone].        Sincerely,      Audrey Blackwell RN    CC: No Recipients

## 2024-03-25 ENCOUNTER — PREP FOR CASE (OUTPATIENT)
Dept: CARDIOLOGY | Facility: CLINIC | Age: 64
End: 2024-03-25
Payer: OTHER GOVERNMENT

## 2024-03-25 DIAGNOSIS — I35.0 NONRHEUMATIC AORTIC VALVE STENOSIS: Primary | ICD-10-CM

## 2024-03-25 DIAGNOSIS — Q23.81 AORTIC REGURGITATION DUE TO BICUSPID AORTIC VALVE: ICD-10-CM

## 2024-03-25 DIAGNOSIS — Q23.81 BICUSPID AORTIC VALVE: ICD-10-CM

## 2024-03-25 DIAGNOSIS — Q23.1 AORTIC REGURGITATION DUE TO BICUSPID AORTIC VALVE: ICD-10-CM

## 2024-03-27 ENCOUNTER — HOSPITAL ENCOUNTER (OUTPATIENT)
Dept: ULTRASOUND IMAGING | Facility: HOSPITAL | Age: 64
Discharge: HOME | End: 2024-03-27
Payer: OTHER GOVERNMENT

## 2024-03-27 ENCOUNTER — HOSPITAL ENCOUNTER (OUTPATIENT)
Dept: CT IMAGING | Facility: HOSPITAL | Age: 64
Discharge: HOME | End: 2024-03-27
Payer: OTHER GOVERNMENT

## 2024-03-27 DIAGNOSIS — I35.9 AORTIC VALVE DEFECT: ICD-10-CM

## 2024-03-27 PROCEDURE — 93880 EXTRACRANIAL BILAT STUDY: CPT

## 2024-03-27 PROCEDURE — 74174 CTA ABD&PLVS W/CONTRAST: CPT

## 2024-03-27 PROCEDURE — 93971 EXTREMITY STUDY: CPT

## 2024-03-27 PROCEDURE — 2550000100 HC RX 255: Mod: JZ | Performed by: THORACIC SURGERY (CARDIOTHORACIC VASCULAR SURGERY)

## 2024-03-27 RX ORDER — IOPAMIDOL 755 MG/ML
80 INJECTION, SOLUTION INTRAVASCULAR ONCE
Status: COMPLETED | OUTPATIENT
Start: 2024-03-27 | End: 2024-03-27

## 2024-03-27 RX ADMIN — IOPAMIDOL 80 ML: 755 INJECTION, SOLUTION INTRAVENOUS at 14:30

## 2024-04-01 NOTE — PROGRESS NOTES
[facility]   [address] [place] [id number]                                           CardioThoracic & Vascular Surgery Outpatient Consult Note      HPI:    Ms. Acevedo is a 64-year-old female with PMHx nonalcoholic fatty liver disease, thyroid nodules, hiatal hernia, chronic bronchitis, HTN, HLD, ascending aortic aneurysm (4.5 cm), bicuspid aortic valve, and intra-septal mass who presents for surgical recommendations by Dr. Huynh.  Referred from general cardiology Dr. Pulliam.  Patient was having some increased fatigue in January.  She recently started on atenolol, so  stopped this and ordered follow-up KENDRICK to reevaluate her bicuspid aortic valve/mild aortic stenosis and ascending aortic aneurysm.  3/14/24 KENDRICK demonstrated a new 1.4 x 1.6 cm well-circumscribed mass attached to the intra-atrial septum near the fossa ovalis.  Bicuspid aortic valve continues to only have mild stenosis with trace regurgitation with aortic root measuring 4.06 cm.  Follow-up carotid with mild, not hemodynamically significant stenosis in bilateral carotids, CTA imaging demonstrated mild atherosclerotic calcifications and 4.5 cm ascending thoracic aortic aneurysm with no evidence of dissection.  Aneurysm is 3.96 cm in 2021, then in July 2023 was 4.2 cm. Left heart cath scheduled for 4/12/24.  Patient has had breast biopsies but no large thoracic surgeries or radiation therapy.    Patient is active around the house with gardening and yard work.  Non-smoker.  Occasional alcohol use.  No illicit drug use.  Will occasionally get dizzy/lightheaded and increased dyspnea with walking uphill.  However no edema, orthopnea, PND, or chest pain.  Patient does note that she does not want a mechanical valve as she was not want to be on warfarin.  Patient also does not want stock blood due to possibility of COVID vaccination.  Has family that can donate and are same blood type.    Past Medical History:   Diagnosis Date     Allergic 1987 - seasonal    Back pain 9/6/2021    Bicuspid aortic valve     Carpal tunnel syndrome     surgery repaired    Cervicalgia     Chronic bronchitis (CMS/HCC)     Complication of anesthesia     Gallstones     Hiatal hernia     History of palpitations     HLD (hyperlipidemia)     HTN (hypertension)     Multiple thyroid nodules     Nonalcoholic fatty liver disease     Obesity 2008 to current    Pneumonia 1992 - 1996    PONV (postoperative nausea and vomiting)     Varicella 1966       Past Surgical History:   Procedure Laterality Date    BI STEREOTACTIC BREAST BIOPSY RIGHT Right 07/18/2023    BI STEREOTACTIC BREAST BIOPSY RIGHT 7/18/2023 TORSTEN RAD EXT FILM    BREAST BIOPSY Right 01/2020    CARPAL TUNNEL RELEASE Bilateral     CHOLECYSTECTOMY N/A 3/18/2024    Procedure: LAPAROSCOPIC CHOLECYSTECTOMY;  Surgeon: Michel Beltran MD;  Location: [facility] OR;  Service: General;  Laterality: N/A;    COLONOSCOPY  02/2010    HYSTERECTOMY      TONSILLECTOMY  1967    TYMPANOSTOMY      UMBILICAL HERNIA REPAIR         Allergies as of 04/03/2024 - Reviewed 04/03/2024   Allergen Reaction Noted    [place] Anaphylaxis 07/29/2021    Neomy-polymyxinb-bacitracin-hc  07/29/2021    Ibuprofen Rash 04/03/2024       Current Outpatient Medications   Medication Sig Dispense Refill    docusate sodium (Colace) 100 mg capsule Take 1 capsule (100 mg total) by mouth 2 (two) times a day To Avoid constipation.  Hold if loose BM's. 50 capsule 1    magnesium hydroxide (MILK OF MAGNESIA) 400 mg/5 mL suspension Take 30 mL by mouth 2 (two) times a day as needed (Constipation) 30 ml. MOM 2x/day as needed for constipation 120 mL 1    ondansetron ODT (ZOFRAN-ODT) 4 mg disintegrating tablet Dissolve 1 tablet (4 mg total) under the tongue every 6 (six) hours as needed for nausea or vomiting 3 tablet 2    aspirin 81 mg EC tablet Take 1 tablet (81 mg total) by mouth daily      RED YEAST RICE ORAL Take 1 tablet by mouth 2 (two) times a day      hydroCHLOROthiazide  (MICROZIDE) 12.5 mg capsule Take 1 capsule (12.5 mg total) by mouth daily      omega-3 acid ethyl esters (LOVAZA) 1 gram capsule Take 2 capsules (2 g total) by mouth 2 (two) times a day      acetaminophen (TYLENOL) 325 mg tablet Take 2 tablets (650 mg total) by mouth every 4 (four) hours as needed for pain scale 1-3/10 50 tablet 1     No current facility-administered medications for this visit.       Family History   Problem Relation Age of Onset    Stroke Mother     Lung cancer Mother     Cancer Mother     COPD Mother     Heart disease Father     Heart attack Father     Heart disease Maternal Grandmother     Cancer Maternal Grandfather     Diabetes Maternal Grandfather     Stroke Paternal Grandmother     Heart attack Paternal Grandfather     Heart disease Paternal Grandfather        Social History     Socioeconomic History    Marital status:    Tobacco Use    Smoking status: Never    Smokeless tobacco: Never   Vaping Use    Vaping Use: Never used   Substance and Sexual Activity    Alcohol use: Not Currently     Comment: Maybe 1 per month even less then that now    Drug use: Never    Sexual activity: Not Currently     Partners: Male     Birth control/protection: None     Answers submitted by the patient for this visit:  Cardiology ROS questionnaire (Submitted on 4/2/2024)  Loss of appetite: No  chills: No  diaphoresis: No  fever: No  malaise/fatigue: No  Night sweats: No  Weight gain: No  weight loss: Yes  congestion: No  hearing loss: No  Nosebleeds: No  Painful swallowing: No  sore throat: No  diarrhea: No  trouble swallowing: No  hematochezia: No  melena: No  nausea: No  Acid reflux: No  vomiting: No  Double vision: No  Vision changes: No  Vision halos: No  chest pain: No  claudication: No  Skin discoloration (cyanosis): No  Shortness of breath on exertion: Yes  Irregular heartbeat: No  leg swelling: No  near-syncope: No  orthopnea: No  palpitations: No  PND: No  syncope: No  Decreased libido:  "Yes  dysuria: No  hematuria: No  Incomplete emptying: No  Waking up at night to urinate: Yes  Menopause: Yes  urgency: No  hemoptysis: No  shortness of breath: Yes  Sleep disturbances: No  Sleep apnea: No  Snoring: No  sputum production: Yes  polydipsia: No  polyuria: No  Thyroid problems: No  Are you diabetic?: No  Bruises/bleeds easily: No  Low blood count: No  Clotting problem: No  Major bleeding: No  blackouts: No  dizziness: Yes  focal weakness: No  Generalized weakness: No  headaches: Yes  light-headedness: Yes  loss of balance: Yes  numbness: No  seizures: No  Sensory changes: No  tremors: No  Skin discoloration: No  Poor wound healing: No  rash: No  Open sores: No  Suspicious lesions: No  Unusual hair distribution: No  back pain: No  Falls: No  joint pain: No  Joint swelling: No  myalgias: No  Muscle weakness: No  altered mental status: No  Depression: No  Hallucinations: No  memory loss: No  Mood swings: No  nervous/anxious: No  Environmental allergies: Yes  HIV exposure: No  Persistent infections: No      A 10 point review of Systems as been completed and is grossly unremarkable except those that may have been mentions in the history of previous illness above.      Objective     Vitals:    04/03/24 1435   BP: 142/77   BP Location: Right arm   Patient Position: Sitting   Cuff Size: Long Adult   Pulse: 74   Resp: 18   SpO2: 97%   Weight: 98.9 kg (218 lb)   Height: 1.676 m (5' 6\")     Weight: 98.9 kg (218 lb)    Physical exam:  Pleasant white female in no acute distress but somewhat emotional which is understandable  Moves all extremities well with no focal deficits  Regular rate and rhythm with no murmurs  No carotid bruits or cervical lymphadenopathy  Breath sounds bilaterally  Soft, nontender nondistended  No clubbing cyanosis or edema    Data Review:     CBC:  Lab Results   Component Value Date    WBC 4.6 03/14/2024    HGB 14.2 03/14/2024    HCT 41.8 03/14/2024    MCV 89.5 03/14/2024    MCH 30.4 " 03/14/2024    MCHC 34.0 03/14/2024    RDW 14.0 03/14/2024     03/14/2024    MPV 8.4 03/14/2024        CMP:  Lab Results   Component Value Date     03/14/2024    K 3.8 03/14/2024     (H) 03/14/2024    CO2 28 03/14/2024    ANIONGAP 8 03/14/2024    BUN 22 03/14/2024    CREATININE 0.88 03/14/2024    GLUCOSE 109 (H) 03/14/2024    AST 34 05/18/2021    ALT 47 05/18/2021    ALKPHOS 39 05/18/2021    PROT 6.8 05/18/2021    ALBUMIN 4.2 05/18/2021    BILITOT 0.70 05/18/2021    EGFR 73 03/14/2024      Radiology:    3/27/24 CT angiogram abdomen pelvis with contrast    IMPRESSION: 1.  Mild atherosclerotic calcifications. 2.  Otherwise normal aorta and arterial system of the abdomen. 3.  Recent cholecystectomy. There is a fluid collection in the gallbladder fossa which is likely a seroma. Correlate for clinical signs of infection or bile leak.    3/27/24 US Carotid duplex bilateral    IMPRESSION: 1.  Mild atherosclerosis without flow significant stenosis. 2.  Antegrade flow noted in both vertebral arteries. Internal Carotid artery measurements are obtained as follows: Rt 77 c/s with ratio 1.5, LT 89.7 c/s with ratio 1.0.    3/27/24 US Venous Mapping Lower Extremity Bilateral    Findings: On the right the greater saphenous vein is patent and measures as follows: Proximal thigh: 4.0 mm Mid thigh: 3.4 mm        Distal thigh: 3.0 mm      Knee: 3.7 mm              Proximal calf: 2.9 mm Mid calf: 1.7 mm Distal calf: 1.3 mm        On the left the greater saphenous vein is patent and measures as follows: Proximal thigh: 4.4 mm Mid thigh: 2.7 mm Distal thigh: 3.2 mm Knee: 3.4 mm Proximal calf: 2.5 mm Mid calf: 1.9 mm Distal calf: 1.8 mm     3/14/24 US KENDRICK complete    Narrative: Transesophageal echocardiograms performed to assess left atrial mass. Left ventricle is normal in size.  Normal systolic function with visual ejection fraction between 55 to 60%.  Normal wall motion. Right ventricle is normal in size with normal  systolic function. Biatria normal in size. There is presence of 1.4 X 1.86 cm oval circumscribed mass with circumference area of 1.83 cm2 attached to intra-atrial septum near fossa ovalis.  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 Aortic valve is bicuspid.  Mild aortic stenosis based on transthoracic echocardiogram.  Trace aortic regurgitation. Ascending aorta mildly dilated with max temperature of 4.06 cm. Trace mitral and tricuspid regurgitation. No evidence of pericardial effusion.     2/13/2024 CT angiogram chest with IV contrast    IMPRESSION:  Aneurysmal dilatation of the ascending thoracic aorta measuring up to 4.5 cm.    Assessment/plan:  Bicuspid aortic valve with mild stenosis/trace regurgitation  Ascending aortic aneurysm 4.6 cm  1.4x1.86 cm myxoma intra-atrial septum near fossa ovalis  Nonalcoholic fatty liver disease  Secondary nodules  Hiatal hernia  Hypertension  Hyperlipidemia  Chronic bronchitis    Recommend resection of atrial myxoma with concomitant ascending aorta replacement under deep hypothermic circulatory arrest and retrograde cerebral perfusion with possible aortic valve replacement.  Discussed all risk including but not limited to bleeding, infection, stroke perioperative MI, postcardiotomy shock requiring mechanical circulatory support, respiratory failure requiring long-term mechanical ventilation.  Acute renal failure, mesenteric and/or peripheral ischemia, arrhythmias, heart block requiring PPM, HIT, imponderables and even death.  Recorded mortality of 5 to 7%.  Discussed benefits mainly preventing major stroke or sudden cardiac death as well as acute aortic syndrome with its high morbidity and mortality.  Patient understands and would like to proceed with surgery.  However patient does not want any blood products from the blood bank she does not want to have anyone who has been previously vaccinated with COVID.  So they have made arrangements with [employer] to have her  and other family  members donate blood and supplemental blood products for the surgery.  This obviously will take some time to coordinate.  She will be having an MRI of the heart to further delineate the atrial myxoma as well as cardiac catheterization for preoperative planning.    Thank you for allowing me to participate in the care of this very nice patient.

## 2024-04-01 NOTE — TELEPHONE ENCOUNTER
Requested to check on the authorization for this procedure as she changed to  for life today and Medicare. called authorization department, they stated that Medicare now becomes primary and  for life as a supplement, Medicare only needs authorization for vein procedures do not know pre-CERT is required.  Called and let Jamir know, she is satisfied with this information.  She also asked to verify that she is to take aspirin on the day of the procedure verified that yes she may take aspirin on the day of the procedure and the days prior.

## 2024-04-01 NOTE — TELEPHONE ENCOUNTER
You are scheduled to have a left heart catheterization to be done on 4/12/2024 at 8:30 AM by Dr. Pulliam at [facility].    Please arrive at the [place] entrance and check in at the  of [facility] at 7 AM on 4/12/2024 for your lab appointment at 7:10 AM.  Please bring a complete, current and accurate list of all your medications, dosages and instructions.      [facility] currently has a two-visitor policy with visiting hours from 7:30 AM to 7:30 PM.  It is not required but it is recommended that you wear a mask while you are in the hospital.     Please do not eat anything after midnight the day of your procedure.  You may have sips of clear liquids until 5 AM or two hours prior to your arrival time on the day of the procedure.  The procedure may be delayed if these guidelines are not followed.     You may take your morning medications with a sip of water with the exception of any vitamins, supplements or any diuretics.  Please hold your hydrochlorothiazide the morning of your procedure.    We do have  parking from 5:00 AM-4:30 PM for your convenience. This is a free service available to you and your support person.    Please plan for a possible overnight stay. There will be a four to six hour recovery following your procedure to minimize bleeding complications. Do not make any plans for the remainder of the day. You will need a  and someone to stay with you when you are discharged after your procedure.    If you are diagnosed with COVID or have any COVID symptoms between now and the time of your scheduled procedure, please contact me.    Patient verbalizes understanding of all instructions and denies further questions.

## 2024-04-02 ASSESSMENT — ENCOUNTER SYMPTOMS
BLACKOUTS: 0
VISUAL CHANGE: 0
MEMORY LOSS: 0
VISUAL HALOS: 0
SPUTUM PRODUCTION: 1
SEIZURES: 0
PERSISTENT INFECTIONS: 0
DOUBLE VISION: 0
TREMORS: 0
SLEEP DISTURBANCES DUE TO BREATHING: 0
PND: 0
BACK PAIN: 0
DYSURIA: 0
POLYDIPSIA: 0
TROUBLE SWALLOWING: 0
FOCAL WEAKNESS: 0
NEAR-SYNCOPE: 0
DECREASED APPETITE: 0
SYNCOPE: 0
DECREASED LIBIDO: 1
DIZZINESS: 1
PALPITATIONS: 0
HEADACHES: 1
SNORING: 0
DIARRHEA: 0
CHILLS: 0
COLOR CHANGE: 0
SHORTNESS OF BREATH: 1
DEPRESSION: 0
JOINT SWELLING: 0
ORTHOPNEA: 0
WEIGHT LOSS: 1
DIAPHORESIS: 0
IRREGULAR HEARTBEAT: 0
ALTERED MENTAL STATUS: 0
UNUSUAL HAIR DISTRIBUTION: 0
WEIGHT GAIN: 0
NIGHT SWEATS: 0
NAUSEA: 0
HEMATURIA: 0
WEAKNESS: 0
BRUISES/BLEEDS EASILY: 0
SENSORY CHANGE: 0
POOR WOUND HEALING: 0
LIGHT-HEADEDNESS: 1
LOSS OF BALANCE: 1
SUSPICIOUS LESIONS: 0
MYALGIAS: 0
SORE THROAT: 0
HEMOPTYSIS: 0
CLAUDICATION: 0
HEMATOCHEZIA: 0
FALLS: 0
VOMITING: 0
NERVOUS/ANXIOUS: 0
ODYNOPHAGIA: 0
NUMBNESS: 0
HALLUCINATIONS: 0
FEVER: 0

## 2024-04-03 ENCOUNTER — OFFICE VISIT (OUTPATIENT)
Dept: CARDIOTHORACIC SURGERY | Facility: CLINIC | Age: 64
End: 2024-04-03
Payer: MEDICARE

## 2024-04-03 VITALS
SYSTOLIC BLOOD PRESSURE: 142 MMHG | OXYGEN SATURATION: 97 % | HEIGHT: 66 IN | DIASTOLIC BLOOD PRESSURE: 77 MMHG | BODY MASS INDEX: 35.03 KG/M2 | WEIGHT: 218 LBS | RESPIRATION RATE: 18 BRPM | HEART RATE: 74 BPM

## 2024-04-03 DIAGNOSIS — Q23.81 BICUSPID AORTIC VALVE: Primary | ICD-10-CM

## 2024-04-03 DIAGNOSIS — D15.1 MYXOMA OF HEART: ICD-10-CM

## 2024-04-03 DIAGNOSIS — I71.21 ANEURYSM OF ASCENDING AORTA WITHOUT RUPTURE (CMS/HCC): ICD-10-CM

## 2024-04-03 PROCEDURE — 99214 OFFICE O/P EST MOD 30 MIN: CPT | Performed by: THORACIC SURGERY (CARDIOTHORACIC VASCULAR SURGERY)

## 2024-04-03 PROCEDURE — G0463 HOSPITAL OUTPT CLINIC VISIT: HCPCS | Performed by: THORACIC SURGERY (CARDIOTHORACIC VASCULAR SURGERY)

## 2024-04-03 ASSESSMENT — PAIN SCALES - GENERAL: PAINLEVEL: 0-NO PAIN

## 2024-04-05 ENCOUNTER — HOSPITAL ENCOUNTER (OUTPATIENT)
Dept: RESPIRATORY THERAPY | Facility: HOSPITAL | Age: 64
Discharge: HOME | End: 2024-04-05
Payer: MEDICARE

## 2024-04-05 DIAGNOSIS — R06.02 SOB (SHORTNESS OF BREATH): ICD-10-CM

## 2024-04-05 DIAGNOSIS — I35.9 AORTIC VALVE DEFECT: ICD-10-CM

## 2024-04-05 PROCEDURE — 94060 EVALUATION OF WHEEZING: CPT | Mod: 26 | Performed by: INTERNAL MEDICINE

## 2024-04-05 PROCEDURE — 94060 EVALUATION OF WHEEZING: CPT

## 2024-04-05 PROCEDURE — 94726 PLETHYSMOGRAPHY LUNG VOLUMES: CPT | Mod: 26 | Performed by: INTERNAL MEDICINE

## 2024-04-05 PROCEDURE — 94729 DIFFUSING CAPACITY: CPT | Mod: 26 | Performed by: INTERNAL MEDICINE

## 2024-04-12 ENCOUNTER — HOSPITAL ENCOUNTER (OUTPATIENT)
Facility: HOSPITAL | Age: 64
Setting detail: OUTPATIENT SURGERY
Discharge: HOME | End: 2024-04-12
Attending: INTERNAL MEDICINE | Admitting: INTERNAL MEDICINE
Payer: MEDICARE

## 2024-04-12 ENCOUNTER — LAB (OUTPATIENT)
Dept: LAB | Facility: HOSPITAL | Age: 64
End: 2024-04-12
Payer: MEDICARE

## 2024-04-12 VITALS
OXYGEN SATURATION: 93 % | HEART RATE: 63 BPM | RESPIRATION RATE: 10 BRPM | WEIGHT: 214 LBS | SYSTOLIC BLOOD PRESSURE: 139 MMHG | TEMPERATURE: 98.1 F | BODY MASS INDEX: 34.54 KG/M2 | DIASTOLIC BLOOD PRESSURE: 84 MMHG

## 2024-04-12 DIAGNOSIS — I35.0 NONRHEUMATIC AORTIC VALVE STENOSIS: ICD-10-CM

## 2024-04-12 DIAGNOSIS — Q23.1 AORTIC REGURGITATION DUE TO BICUSPID AORTIC VALVE: ICD-10-CM

## 2024-04-12 DIAGNOSIS — I25.10 CORONARY ARTERY DISEASE INVOLVING NATIVE CORONARY ARTERY OF NATIVE HEART WITHOUT ANGINA PECTORIS: Primary | ICD-10-CM

## 2024-04-12 DIAGNOSIS — Q23.81 AORTIC REGURGITATION DUE TO BICUSPID AORTIC VALVE: ICD-10-CM

## 2024-04-12 DIAGNOSIS — Q23.81 BICUSPID AORTIC VALVE: ICD-10-CM

## 2024-04-12 LAB
ANION GAP SERPL CALC-SCNC: 8 MMOL/L (ref 3–11)
BUN SERPL-MCNC: 19 MG/DL (ref 7–25)
CALCIUM SERPL-MCNC: 10.4 MG/DL (ref 8.6–10.3)
CHLORIDE SERPL-SCNC: 108 MMOL/L (ref 98–107)
CO2 SERPL-SCNC: 27 MMOL/L (ref 21–32)
CREAT SERPL-MCNC: 0.85 MG/DL (ref 0.6–1.1)
EGFRCR SERPLBLD CKD-EPI 2021: 76 ML/MIN/1.73M*2
ERYTHROCYTE [DISTWIDTH] IN BLOOD BY AUTOMATED COUNT: 13.9 % (ref 11.5–14)
GLUCOSE SERPL-MCNC: 111 MG/DL (ref 70–105)
HCT VFR BLD AUTO: 42 % (ref 34–45)
HGB BLD-MCNC: 14.4 G/DL (ref 11.5–15.5)
MCH RBC QN AUTO: 30.9 PG (ref 28–33)
MCHC RBC AUTO-ENTMCNC: 34.2 G/DL (ref 32–36)
MCV RBC AUTO: 90.2 FL (ref 81–97)
PLATELET # BLD AUTO: 249 10*3/UL (ref 140–350)
PMV BLD AUTO: 8 FL (ref 6.9–10.8)
POTASSIUM SERPL-SCNC: 4.9 MMOL/L (ref 3.5–5.1)
RBC # BLD AUTO: 4.66 10*6/ΜL (ref 3.7–5.3)
SODIUM SERPL-SCNC: 143 MMOL/L (ref 135–145)
WBC # BLD AUTO: 4.8 10*3/UL (ref 4.5–10.5)

## 2024-04-12 PROCEDURE — 2550000100 HC RX 255: Mod: JZ | Performed by: INTERNAL MEDICINE

## 2024-04-12 PROCEDURE — C1751 CATH, INF, PER/CENT/MIDLINE: HCPCS | Performed by: INTERNAL MEDICINE

## 2024-04-12 PROCEDURE — 80048 BASIC METABOLIC PNL TOTAL CA: CPT

## 2024-04-12 PROCEDURE — C1894 INTRO/SHEATH, NON-LASER: HCPCS | Performed by: INTERNAL MEDICINE

## 2024-04-12 PROCEDURE — 99152 MOD SED SAME PHYS/QHP 5/>YRS: CPT | Performed by: INTERNAL MEDICINE

## 2024-04-12 PROCEDURE — (BLANK) HC RECOVERY PHASE-2 EACH ADDITIONAL 1/2 HOUR ACUITY LEVEL 2: Performed by: INTERNAL MEDICINE

## 2024-04-12 PROCEDURE — 6360000200 HC RX 636 W HCPCS (ALT 250 FOR IP): Performed by: INTERNAL MEDICINE

## 2024-04-12 PROCEDURE — 2720000000 HC SUPP 272 WO HCPCS: Performed by: INTERNAL MEDICINE

## 2024-04-12 PROCEDURE — (BLANK) HC CATH LAB LEVEL 1 EACH ADDITIONAL MIN: Performed by: INTERNAL MEDICINE

## 2024-04-12 PROCEDURE — 2580000300 HC RX 258: Performed by: INTERNAL MEDICINE

## 2024-04-12 PROCEDURE — 36415 COLL VENOUS BLD VENIPUNCTURE: CPT

## 2024-04-12 PROCEDURE — 2500000200 HC RX 250 WO HCPCS: Performed by: INTERNAL MEDICINE

## 2024-04-12 PROCEDURE — (BLANK) HC RECOVERY PHASE-2 1ST 1/2 HOUR ACUITY LEVEL 2: Performed by: INTERNAL MEDICINE

## 2024-04-12 PROCEDURE — 85027 COMPLETE CBC AUTOMATED: CPT

## 2024-04-12 PROCEDURE — C1769 GUIDE WIRE: HCPCS | Performed by: INTERNAL MEDICINE

## 2024-04-12 PROCEDURE — 93454 CORONARY ARTERY ANGIO S&I: CPT | Mod: 26 | Performed by: INTERNAL MEDICINE

## 2024-04-12 PROCEDURE — 99153 MOD SED SAME PHYS/QHP EA: CPT | Performed by: INTERNAL MEDICINE

## 2024-04-12 PROCEDURE — (BLANK) HC CATH LAB LEVEL 1 FIRST 15 MIN: Performed by: INTERNAL MEDICINE

## 2024-04-12 RX ORDER — MIDAZOLAM HYDROCHLORIDE 1 MG/ML
INJECTION INTRAMUSCULAR; INTRAVENOUS CODE/TRAUMA/SEDATION MEDICATION
Status: DISCONTINUED | OUTPATIENT
Start: 2024-04-12 | End: 2024-04-12 | Stop reason: HOSPADM

## 2024-04-12 RX ORDER — ONDANSETRON HYDROCHLORIDE 2 MG/ML
4 INJECTION, SOLUTION INTRAVENOUS EVERY 6 HOURS PRN
Status: DISCONTINUED | OUTPATIENT
Start: 2024-04-12 | End: 2024-04-12 | Stop reason: HOSPADM

## 2024-04-12 RX ORDER — ATORVASTATIN CALCIUM 40 MG/1
40 TABLET, FILM COATED ORAL DAILY
Qty: 90 TABLET | Refills: 2 | Status: SHIPPED | OUTPATIENT
Start: 2024-04-12 | End: 2024-04-29 | Stop reason: ALTCHOICE

## 2024-04-12 RX ORDER — ATROPINE SULFATE 0.1 MG/ML
.5-1 INJECTION INTRAVENOUS ONCE AS NEEDED
Status: DISCONTINUED | OUTPATIENT
Start: 2024-04-12 | End: 2024-04-12 | Stop reason: HOSPADM

## 2024-04-12 RX ORDER — LIDOCAINE HYDROCHLORIDE 10 MG/ML
INJECTION, SOLUTION EPIDURAL; INFILTRATION; INTRACAUDAL; PERINEURAL CODE/TRAUMA/SEDATION MEDICATION
Status: DISCONTINUED | OUTPATIENT
Start: 2024-04-12 | End: 2024-04-12 | Stop reason: HOSPADM

## 2024-04-12 RX ORDER — SODIUM CHLORIDE 9 MG/ML
125 INJECTION, SOLUTION INTRAVENOUS CONTINUOUS
Status: ACTIVE | OUTPATIENT
Start: 2024-04-12 | End: 2024-04-12

## 2024-04-12 RX ORDER — HEPARIN SODIUM 1000 [USP'U]/ML
INJECTION, SOLUTION INTRAVENOUS; SUBCUTANEOUS CODE/TRAUMA/SEDATION MEDICATION
Status: DISCONTINUED | OUTPATIENT
Start: 2024-04-12 | End: 2024-04-12 | Stop reason: HOSPADM

## 2024-04-12 RX ORDER — NITROGLYCERIN 0.4 MG/1
0.4 TABLET SUBLINGUAL EVERY 5 MIN PRN
Status: DISCONTINUED | OUTPATIENT
Start: 2024-04-12 | End: 2024-04-12 | Stop reason: HOSPADM

## 2024-04-12 RX ADMIN — SODIUM CHLORIDE 125 ML/HR: 9 INJECTION, SOLUTION INTRAVENOUS at 09:39

## 2024-04-12 ASSESSMENT — ENCOUNTER SYMPTOMS
DYSPNEA ON EXERTION: 1
DIZZINESS: 1
PND: 0
NEAR-SYNCOPE: 0
SYNCOPE: 0
IRREGULAR HEARTBEAT: 0
PALPITATIONS: 0
ORTHOPNEA: 0

## 2024-04-12 NOTE — H&P
Cardiology History and Physical Note    Patient name: Jamir Acevedo  MRN: [id number]  Admit date: 4/12/2024    Subjective    Patient ID: Jamir Acevedo is a 63 y.o. female.    Chief Complaint: Intraseptal mass    The patient presents to the [facility] for a scheduled left heart cath with Dr. Pulliam.  They have not had an evaluation/ H&P performed within the last 30 days prior and one will be performed today.    This is a patient with a history of nonalcoholic fatty liver disease, thyroid nodules, hiatal hernia, chronic bronchitis, hypertension, hyperlipidemia, ascending aortic aneurysm, bicuspid aortic valve and intraseptal mass.  She was seen by Dr. Huynh 4/3/2024 for surgical recommendations for management of intraseptal mass.  She had a KENDRICK for evaluation of a bicuspid aortic valve 3/14/2024 which showed a new 1.4 x 1.6 cm [place] circumscribed mass attached to the intra-atrial septum near the fossa ovalis.  Bicuspid valve showed mild stenosis with trace regurgitation and aortic root measuring 4.06 cm.  She was subsequently scheduled for left heart cath today.      Patient is here today with her house.  She denies any cardiac symptoms today including reports of chest discomfort, shortness of breath, palpitation, lightheadedness, dizziness, orthopnea/PND, lower extremity edema, claudication, excessive bleeding, TIA or stroke-like symptoms.  She does experience some shortness of breath with exertion.  She states that she has not been very active recently as she had her gallbladder surgery recently.  She is unsure if she has any symptoms with exertion.  She does not smoke or drink alcohol.  She is having some anxiety this morning as she is a history of severe nausea with anesthesia.  Looking back through previous medical history it appears that it was likely fentanyl that caused her nausea.  Dr. Pulliam has been made aware of this and will take this into consideration.   "    LAST ECHO EF : 63%  Last NPO at: See nursing documentation see nursing documentation  Medications taken this morning: See nursing documentation    The patient denies any previous adverse reaction to IV contrast.    Sedation Plan  Mallampati class: III (soft and hard palate and base of uvula visible)  Full ROM of neck Yes  ASA class: ASA 3 - Patient with moderate systemic disease with functional limitations  Anesthesia Type: Concious Sedation  The patient denies any previous adverse reaction to anesthesia or sedation.  Risks, benefits, and alternatives discussed with patient.    Lab Results   Component Value Date    WBC 4.6 03/14/2024    HGB 14.2 03/14/2024    HCT 41.8 03/14/2024    MCV 89.5 03/14/2024     03/14/2024     Lab Results   Component Value Date    CREATININE 0.88 03/14/2024    BUN 22 03/14/2024     03/14/2024    K 3.8 03/14/2024     (H) 03/14/2024    CO2 28 03/14/2024     No results found for: \"INR\", \"PT\"    Past Medical History:   Diagnosis Date    Allergic 1987 - seasonal    Back pain 9/6/2021    Bicuspid aortic valve     Carpal tunnel syndrome     surgery repaired    Cervicalgia     Chronic bronchitis (CMS/HCC)     Complication of anesthesia     Gallstones     Hiatal hernia     History of palpitations     HLD (hyperlipidemia)     HTN (hypertension)     Multiple thyroid nodules     Nonalcoholic fatty liver disease     Obesity 2008 to current    Pneumonia 1992 - 1996    PONV (postoperative nausea and vomiting)     Varicella 1966     Past Surgical History:   Procedure Laterality Date    BI STEREOTACTIC BREAST BIOPSY RIGHT Right 07/18/2023    BI STEREOTACTIC BREAST BIOPSY RIGHT 7/18/2023 TORSTEN RAD EXT FILM    BREAST BIOPSY Right 01/2020    CARPAL TUNNEL RELEASE Bilateral     CHOLECYSTECTOMY N/A 3/18/2024    Procedure: LAPAROSCOPIC CHOLECYSTECTOMY;  Surgeon: Michel Beltran MD;  Location: [facility];  Service: General;  Laterality: N/A;    COLONOSCOPY  02/2010    HYSTERECTOMY      " "TONSILLECTOMY  1967    TYMPANOSTOMY      UMBILICAL HERNIA REPAIR       Family History   Problem Relation Age of Onset    Stroke Mother     Lung cancer Mother     Cancer Mother     COPD Mother     Heart disease Father     Heart attack Father     Heart disease Maternal Grandmother     Cancer Maternal Grandfather     Diabetes Maternal Grandfather     Stroke Paternal Grandmother     Heart attack Paternal Grandfather     Heart disease Paternal Grandfather      Social History     Tobacco Use    Smoking status: Never    Smokeless tobacco: Never   Vaping Use    Vaping Use: Never used   Substance Use Topics    Alcohol use: Not Currently     Comment: Maybe 1 per month even less then that now    Drug use: Never       Allergies   Allergen Reactions    [place] Anaphylaxis    Neomy-Polymyxinb-Bacitracin-Hc     Ibuprofen Rash     \"Motrin\"       Medications Prior to Admission   Medication Sig Dispense Refill Last Dose    acetaminophen (TYLENOL) 325 mg tablet Take 2 tablets (650 mg total) by mouth every 4 (four) hours as needed for pain scale 1-3/10 50 tablet 1     docusate sodium (Colace) 100 mg capsule Take 1 capsule (100 mg total) by mouth 2 (two) times a day To Avoid constipation.  Hold if loose BM's. 50 capsule 1     magnesium hydroxide (MILK OF MAGNESIA) 400 mg/5 mL suspension Take 30 mL by mouth 2 (two) times a day as needed (Constipation) 30 ml. MOM 2x/day as needed for constipation 120 mL 1     ondansetron ODT (ZOFRAN-ODT) 4 mg disintegrating tablet Dissolve 1 tablet (4 mg total) under the tongue every 6 (six) hours as needed for nausea or vomiting 3 tablet 2     aspirin 81 mg EC tablet Take 1 tablet (81 mg total) by mouth daily       RED YEAST RICE ORAL Take 1 tablet by mouth 2 (two) times a day       hydroCHLOROthiazide (MICROZIDE) 12.5 mg capsule Take 1 capsule (12.5 mg total) by mouth daily       omega-3 acid ethyl esters (LOVAZA) 1 gram capsule Take 2 capsules (2 g total) by mouth 2 (two) times a day          Review of " Systems  Review of Systems   Cardiovascular:  Positive for dyspnea on exertion. Negative for chest pain, irregular heartbeat, leg swelling, near-syncope, orthopnea, palpitations, paroxysmal nocturnal dyspnea and syncope.   Neurological:  Positive for dizziness.   All other systems reviewed and are negative.      Physical Exam     General: Comfortable not in any acute distress, alert awake and oriented x3  HEENT: Normocephalic atraumatic  Neck: No jugular venous distention or carotid bruit  Chest: Clear to auscultation, no adventitious breath sounds  CVS: S1-S2, regular rate rhythm, no murmurs rubs or gallops  Abdomen: Soft, nontender nondistended, normoactive bowel sounds present  Extremities: No edema cyanosis or clubbing  Vascular: 2+ radial pulsation bilaterally, 2+ dorsalis pedis and posterior tibial pulsation  Neurologic evaluation: No focal deficits, motor symmetric  Skin: No ecchymosis bruises or rashes  Mood: Euthymic    Assessment and Plan    Active Problems:    Nonrheumatic aortic valve stenosis    Bicuspid aortic valve    Aortic regurgitation due to bicuspid aortic valve      Plan: Left heart cath today with Dr. Pulliam    CONSENT  The patient states to myself that they have had an in-depth discussion with the medical provider performing their procedure today prior to arrival.  At that time they were given the opportunity to hear the benefits, risks, alternatives and possible complications associated with their procedure that will be performed today. They deny any additional questions for the provider.     This patient seen and evaluated in conjunction with Dr. Pulliam    Some sections of this report may have been generated using a voice to text program.  Every effort is made to correct errors.  If mistakes are found they should be taken in context.    Electronically signed by: Margie Carter CNP  4/12/2024  7:07 AM

## 2024-04-12 NOTE — DISCHARGE INSTRUCTIONS
Coronary Angioplasty, After-Care     Medications   You will be discharged on aspirin 81 mg daily and either Plavix, Brilinta, or Effient that helps prevent clotting. These two drugs are continued for 6 to 12 months and sometimes longer.  These medications should NOT be stopped without approval of your cardiologist.   Do not take any additional nonsteroidal anti-inflammatory agents, such as ibuprofen or naproxen, unless directed by a physician.  This can increase bleeding risk.   If you are on Metformin, restart this medication 48 hours after procedure.     General Instructions   Do not lift greater than 10 lbs (4.5 kg) for 5 days after your procedure.   Do not drive for 24 hours since you were given a sedative during your procedure.   If you had a radial site, do no push or pull heavy objects with the affected arm or operate machinery or power tools for 5 days post-procedure.   If you had a femoral site, avoid climbing stairs as much as possible for the first 2-3 days after your procedure.  Do not squat for 3 days post-procedure.   If you have not already been set up at discharge, you will be contacted by cardiac rehab to complete outpatient cardiac therapy in approximately one week.   Follow-up with your primary cardiologist or [facility] in approximately 4-6 weeks. This may be scheduled for you at the time of discharge.     Bathing   You may shower 24-48 hours after the procedure.   Do not take baths, swim, or use a hot tub for one week.     Insertion Site Care   Gently wash the site with plain soap and water. Use a clean towel to pat the area dry.  Do not rub the site, because this may cause bleeding.  Do not apply powder or lotion to the site.   Check your insertion site every day for signs of infection. Check for more redness, swelling, or pain, more fluid or blood, warmth, and/or pus or a bad smell.   It is common to have bruising and tenderness at the catheter insertion area. This usually will fade in 1-2 weeks.       Contact [facility] at [phone] if:   You have a fever (greater than 100.4° F) or chills.   You have increased bleeding from the insertion site. Hold pressure on the site.     Get help right away if:   You develop chest pain or shortness of breath, feel faint, or pass out.   You have unusual pain at the insertion site.   You have redness, warmth, or swelling at the insertion site.   You have drainage (other than a small amount of blood on the dressing) from the insertion site.   The insertion site is bleeding, and the bleeding does not stop after 30 minutes of holding steady pressure on the site.   You develop bleeding from any other place, such as from the rectum. There may be bright red blood in your urine or stool, or it may appear as black, tarry stool.      [facility] can be reached at [phone]. We had a pleasure taking care of you during your stay at [facility]!

## 2024-04-12 NOTE — Clinical Note
Prepped: groin and right radial. The site was clipped. Prepped with: ChloraPrep. The patient was draped  in the usual sterile fashion.

## 2024-04-15 ENCOUNTER — LAB (OUTPATIENT)
Dept: LAB | Facility: HOSPITAL | Age: 64
End: 2024-04-15
Payer: MEDICARE

## 2024-04-15 DIAGNOSIS — I25.10 CORONARY ARTERY DISEASE INVOLVING NATIVE CORONARY ARTERY OF NATIVE HEART WITHOUT ANGINA PECTORIS: ICD-10-CM

## 2024-04-15 LAB
ANION GAP SERPL CALC-SCNC: 8 MMOL/L (ref 3–11)
BUN SERPL-MCNC: 17 MG/DL (ref 7–25)
CALCIUM SERPL-MCNC: 9.9 MG/DL (ref 8.6–10.3)
CHLORIDE SERPL-SCNC: 107 MMOL/L (ref 98–107)
CO2 SERPL-SCNC: 26 MMOL/L (ref 21–32)
CREAT SERPL-MCNC: 0.81 MG/DL (ref 0.6–1.1)
EGFRCR SERPLBLD CKD-EPI 2021: 81 ML/MIN/1.73M*2
GLUCOSE SERPL-MCNC: 96 MG/DL (ref 70–105)
POTASSIUM SERPL-SCNC: 3.7 MMOL/L (ref 3.5–5.1)
SODIUM SERPL-SCNC: 141 MMOL/L (ref 135–145)

## 2024-04-15 PROCEDURE — 36415 COLL VENOUS BLD VENIPUNCTURE: CPT

## 2024-04-15 PROCEDURE — 80048 BASIC METABOLIC PNL TOTAL CA: CPT

## 2024-04-23 ENCOUNTER — HOSPITAL ENCOUNTER (OUTPATIENT)
Dept: MRI IMAGING | Facility: HOSPITAL | Age: 64
Discharge: HOME | End: 2024-04-23
Payer: MEDICARE

## 2024-04-23 DIAGNOSIS — I51.89 CARDIAC MASS: ICD-10-CM

## 2024-04-23 PROCEDURE — 2550000100 HC RX 255: Performed by: INTERNAL MEDICINE

## 2024-04-23 PROCEDURE — 75561 CARDIAC MRI FOR MORPH W/DYE: CPT

## 2024-04-23 PROCEDURE — A9577 INJ MULTIHANCE: HCPCS | Performed by: INTERNAL MEDICINE

## 2024-04-23 RX ADMIN — GADOBENATE DIMEGLUMINE 19 ML: 529 INJECTION, SOLUTION INTRAVENOUS at 11:13

## 2024-04-24 NOTE — RESULT ENCOUNTER NOTE
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 Thanks

## 2024-04-24 NOTE — PROGRESS NOTES
[facility]   [address] [place] [id number]                                           CardioThoracic & Vascular Surgery Outpatient Follow-up Note      HPI:    Ms. Acevedo is a 64-year-old female with PMHx nonalcoholic fatty liver disease, thyroid nodules, hiatal hernia, chronic bronchitis, HTN, HLD, ascending aortic aneurysm (4.5 cm), bicuspid aortic valve, and intra-septal mass who presents for imaging follow-up with Dr. Huynh.  Referred from general cardiology Dr. Pulliam.      Patient was having some increased fatigue in January.  3/14/24 KENDRICK demonstrated a new 1.4 x 1.6 cm well-circumscribed mass attached to the intra-atrial septum near the fossa ovalis.  Bicuspid aortic valve continues to only have mild stenosis with trace regurgitation with aortic root measuring 4.06 cm.  Follow-up carotid with mild, not hemodynamically significant stenosis in bilateral carotids, CTA imaging demonstrated mild atherosclerotic calcifications and 4.5 cm ascending thoracic aortic aneurysm with no evidence of dissection.  Aneurysm is 3.96 cm in 2021, then in July 2023 was 4.2 cm. Left heart cath demonstrated 70% LAD ostia stenosis and mid LAD 80% stenosis along with mid RCA 30% stenosis. Thoracic surgical history of left breast biopsies but no large surgeries or radiation therapy.     Patient is active around the house with gardening and yard work.  Non-smoker.  Occasional alcohol use.  No illicit drug use.  Will occasionally get dizzy/lightheaded and increased dyspnea with walking uphill.  However no edema, orthopnea, PND, or chest pain.  Patient does note that she does not want a mechanical valve as she was not want to be on warfarin.      Patient has returned today to discuss results of coronary angiogram.  Also had long discussion with [facility] blood supply and found the process to be exhausted and unlikely to have enough donors for what is necessary.  Patient and family decided to accept generic donor  blood to speed up process.  Continues to deny chest pain, edema, or new palpitations       Past Medical History:   Diagnosis Date    Allergic 1987 - seasonal    Back pain 9/6/2021    Bicuspid aortic valve     Carpal tunnel syndrome     surgery repaired    Cervicalgia     Chronic bronchitis (CMS/HCC)     Complication of anesthesia     Gallstones     Hiatal hernia     History of palpitations     HLD (hyperlipidemia)     HTN (hypertension)     Multiple thyroid nodules     Nonalcoholic fatty liver disease     Obesity 2008 to current    Pneumonia 1992 - 1996    PONV (postoperative nausea and vomiting)     Varicella 1966       Past Surgical History:   Procedure Laterality Date    BI STEREOTACTIC BREAST BIOPSY RIGHT Right 07/18/2023    BI STEREOTACTIC BREAST BIOPSY RIGHT 7/18/2023 TORSTEN RAD EXT FILM    BREAST BIOPSY Right 01/2020    CARPAL TUNNEL RELEASE Bilateral     CHOLECYSTECTOMY N/A 3/18/2024    Procedure: LAPAROSCOPIC CHOLECYSTECTOMY;  Surgeon: Michel Beltran MD;  Location: [facility] OR;  Service: General;  Laterality: N/A;    COLONOSCOPY  02/2010    HYSTERECTOMY      LEFT HEART CATH Left 4/12/2024    Procedure: Left heart cath;  Surgeon: Pascual Pulliam MD;  Location: [facility] Cath Lab;  Service: Cardiovascular;  Laterality: Left;    TONSILLECTOMY  1967    TYMPANOSTOMY      UMBILICAL HERNIA REPAIR         Allergies as of 04/29/2024 - Reviewed 04/29/2024   Allergen Reaction Noted    [place] Anaphylaxis 07/29/2021    Neomy-polymyxinb-bacitracin-hc  07/29/2021    Ibuprofen Rash 04/03/2024       Current Outpatient Medications   Medication Sig Dispense Refill    aspirin 81 mg EC tablet Take 1 tablet (81 mg total) by mouth daily      omega-3 acid ethyl esters (LOVAZA) 1 gram capsule Take 2 capsules (2 g total) by mouth 2 (two) times a day      atorvastatin (LIPITOR) 40 mg tablet Take 1 tablet (40 mg total) by mouth daily (Patient not taking: Reported on 4/29/2024) 90 tablet 2    hydroCHLOROthiazide (MICROZIDE) 12.5 mg capsule Take 1  capsule (12.5 mg total) by mouth daily       No current facility-administered medications for this visit.       Family History   Problem Relation Age of Onset    Stroke Mother     Lung cancer Mother     Cancer Mother     COPD Mother     Heart disease Father     Heart attack Father     Heart disease Maternal Grandmother     Cancer Maternal Grandfather     Diabetes Maternal Grandfather     Stroke Paternal Grandmother     Heart attack Paternal Grandfather     Heart disease Paternal Grandfather        Social History     Socioeconomic History    Marital status:    Tobacco Use    Smoking status: Never    Smokeless tobacco: Never   Vaping Use    Vaping Use: Never used   Substance and Sexual Activity    Alcohol use: Not Currently     Comment: Maybe 1 per month even less then that now    Drug use: Never    Sexual activity: Not Currently     Partners: Male     Birth control/protection: None     Answers submitted by the patient for this visit:  Cardiology ROS questionnaire (Submitted on 4/29/2024)  Loss of appetite: No  chills: No  diaphoresis: No  fever: No  malaise/fatigue: No  Night sweats: No  Weight gain: No  weight loss: No  congestion: No  hearing loss: No  Nosebleeds: No  Painful swallowing: No  sore throat: No  diarrhea: No  trouble swallowing: No  hematochezia: No  melena: No  nausea: No  Acid reflux: No  vomiting: No  Double vision: No  Vision changes: No  Vision halos: No  chest pain: No  claudication: No  Skin discoloration (cyanosis): No  Shortness of breath on exertion: No  Irregular heartbeat: No  leg swelling: No  near-syncope: No  orthopnea: No  palpitations: No  PND: No  syncope: No  Decreased libido: Yes  dysuria: No  hematuria: No  Incomplete emptying: No  Waking up at night to urinate: Yes  Menopause: No  urgency: No  hemoptysis: No  shortness of breath: No  Sleep disturbances: No  Sleep apnea: No  Snoring: No  sputum production: No  polydipsia: No  polyuria: No  Thyroid problems: No  Are you  "diabetic?: No  Bruises/bleeds easily: No  Low blood count: No  Clotting problem: No  Major bleeding: No  blackouts: No  dizziness: Yes  focal weakness: No  Generalized weakness: No  headaches: No  light-headedness: No  loss of balance: Yes  numbness: No  seizures: No  Sensory changes: No  tremors: Yes  Skin discoloration: No  Poor wound healing: No  rash: No  Open sores: No  Suspicious lesions: No  Unusual hair distribution: No  back pain: No  Falls: No  joint pain: No  Joint swelling: No  myalgias: No  Muscle weakness: No  altered mental status: No  Depression: No  Hallucinations: No  memory loss: No  Mood swings: No  nervous/anxious: No  Environmental allergies: No  HIV exposure: No  Persistent infections: No      A 10 point review of Systems as been completed and is grossly unremarkable except those that may have been mentions in the history of previous illness above.      Objective     Vitals:    04/29/24 1435   BP: 144/94   BP Location: Right arm   Patient Position: Sitting   Cuff Size: Long Adult   Pulse: 71   Resp: 18   SpO2: 98%   Weight: 97.5 kg (215 lb)   Height: 1.676 m (5' 6\")     Weight: 97.5 kg (215 lb)    Physical exam    General: Mildly emotional, alert, awake and oriented x3  HEENT: Normocephalic atraumatic  Neck: No jugular venous distention or carotid bruit  Chest: Clear to auscultation, no adventitious breath sounds  CVS: S1-S2, regular rate rhythm, systolic murmur  Abdomen: Soft, nontender, nondistended, NABS  Extremities: No edema, cyanosis, or clubbing.  BL toes with brisk cap refill  Vascular: 2+ bilateral radial pulse,   Neurologic evaluation: No focal deficits, motor symmetric  Skin: No ecchymosis bruises or rashes, no ulcers  Mood: Euthymic      Data Review:     CBC:  Lab Results   Component Value Date    WBC 4.8 04/12/2024    HGB 14.4 04/12/2024    HCT 42.0 04/12/2024    MCV 90.2 04/12/2024    MCH 30.9 04/12/2024    MCHC 34.2 04/12/2024    RDW 13.9 04/12/2024     04/12/2024    MPV " 8.0 04/12/2024        CMP:  Lab Results   Component Value Date     04/15/2024    K 3.7 04/15/2024     04/15/2024    CO2 26 04/15/2024    ANIONGAP 8 04/15/2024    BUN 17 04/15/2024    CREATININE 0.81 04/15/2024    GLUCOSE 96 04/15/2024    AST 34 05/18/2021    ALT 47 05/18/2021    ALKPHOS 39 05/18/2021    PROT 6.8 05/18/2021    ALBUMIN 4.2 05/18/2021    BILITOT 0.70 05/18/2021    EGFR 81 04/15/2024        Radiology:    4/23/24 MR cardiac morphology with and without contrast    IMPRESSION: 2.5 cm mass is identified at the left atrium, which attaches to the fossa ovalis. 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 There is a broad attachment to the fossa ovalis. Preserved left ventricular systolic function. 0% left ventricular scar. Normal systolic function and motion of the right ventricle. Normal bilateral atria.     4/12/24 Cardiac catheterization    Final Impression: Distal left main has 70% stenosis which involves ostial left into descending artery. Ostial left anterior descending artery has 80% stenosis. Left circumflex artery is patent. Proximal right coronary artery has 30% stenosis.    4/5/24 PFT complete study    Narrative: Patient complete PFTs performed for evaluation of shortness of breath.  The patient gave good effort and met ATS criteria throughout. FVC 3.47 or 116% of predicted.  FEV1 2.23 101% of predicted.  FEV1 to FVC ratio 69 with a lower limit of normal of 67 and a Z-score of -1.42.  The patient did not have a significant response of bronchodilators.  Lung volumes are normal.  Diffusion capacity is normal at 99% of predicted.  Flow volume loop is normal. Overall, the patient has normal complete PFTs without prior on file for comparison.         3/27/24 CT angiogram abdomen pelvis with contrast     IMPRESSION: 1.  Mild atherosclerotic calcifications. 2.  Otherwise normal aorta and arterial system of the abdomen. 3.  Recent cholecystectomy. There is a fluid collection in the  gallbladder fossa which is likely a seroma. Correlate for clinical signs of infection or bile leak.     3/27/24 US Carotid duplex bilateral     IMPRESSION: 1.  Mild atherosclerosis without flow significant stenosis. 2.  Antegrade flow noted in both vertebral arteries. Internal Carotid artery measurements are obtained as follows: Rt 77 c/s with ratio 1.5, LT 89.7 c/s with ratio 1.0.     3/27/24 US Venous Mapping Lower Extremity Bilateral     Findings: On the right the greater saphenous vein is patent and measures as follows: Proximal thigh: 4.0 mm Mid thigh: 3.4 mm        Distal thigh: 3.0 mm      Knee: 3.7 mm              Proximal calf: 2.9 mm Mid calf: 1.7 mm Distal calf: 1.3 mm        On the left the greater saphenous vein is patent and measures as follows: Proximal thigh: 4.4 mm Mid thigh: 2.7 mm Distal thigh: 3.2 mm Knee: 3.4 mm Proximal calf: 2.5 mm Mid calf: 1.9 mm Distal calf: 1.8 mm      3/14/24 US KENDRICK complete     Narrative: Transesophageal echocardiograms performed to assess left atrial mass. Left ventricle is normal in size.  Normal systolic function with visual ejection fraction between 55 to 60%.  Normal wall motion. Right ventricle is normal in size with normal systolic function. Biatria normal in size. There is presence of 1.4 X 1.86 cm oval circumscribed mass with circumference area of 1.83 cm2 attached to intra-atrial septum near fossa ovalis.  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 Aortic valve is bicuspid.  Mild aortic stenosis based on transthoracic echocardiogram.  Trace aortic regurgitation. Ascending aorta mildly dilated with max temperature of 4.06 cm. Trace mitral and tricuspid regurgitation. No evidence of pericardial effusion.      2/13/2024 CT angiogram chest with IV contrast     IMPRESSION:  Aneurysmal dilatation of the ascending thoracic aorta measuring up to 4.5 cm.     STS    Procedure Type: CABG + AVR   PERIOPERATIVE OUTCOME ESTIMATE %   Operative Mortality 3.24%   Morbidity & Mortality 14.3%    Stroke 2.49%   Renal Failure 2.9%   Reoperation 4%   Prolonged Ventilation 9.76%   Deep Sternal Wound Infection 0.262%   Long Hospital Stay (>14 days) 11%   Short Hospital Stay (<6 days)* 26.6%     I have discussed the risks and details of the planned procedure with the  patient and family, including the STS Risk Stratification per the STS Risk Calculator.  STS is limited in this case as patient will also need resection of atrial myxoma and ascending aorta replacement.    Assessment    Assessment/plan:  Nonalcoholic fatty liver disease  Secondary nodules  Hiatal hernia  Hypertension  Hyperlipidemia  Chronic bronchitis    Coronary artery disease  Bicuspid aortic valve with mild stenosis/trace regurgitation  Ascending aortic aneurysm 4.6 cm  1.4x1.86 cm myxoma intra-atrial septum near fossa ovalis  Patient with large myxoma with ascending aneurysm and two-vessel coronary artery disease.  Symptomatic with dyspnea on exertion and dizziness  Recommend resection of atrial myxoma with concomitant CABG x 2 and ascending aortic replacement under deep hypothermic circulatory arrest and retrograde cerebral perfusion with possible aortic valve replacement.  Of note patient does get severe nausea with anesthesia so we will need to have medication on board postop  Patient has changed her mind about blood products and will now receive community blood products regardless of vaccination status  Patient's son will be in town week of May 20 and would prefer to have procedure that week if at all possible    Electronically signed Manuela Sánchez PA-C

## 2024-04-29 ENCOUNTER — OFFICE VISIT (OUTPATIENT)
Dept: CARDIOTHORACIC SURGERY | Facility: CLINIC | Age: 64
End: 2024-04-29
Payer: MEDICARE

## 2024-04-29 VITALS
HEART RATE: 71 BPM | HEIGHT: 66 IN | RESPIRATION RATE: 18 BRPM | DIASTOLIC BLOOD PRESSURE: 94 MMHG | SYSTOLIC BLOOD PRESSURE: 144 MMHG | BODY MASS INDEX: 34.55 KG/M2 | OXYGEN SATURATION: 98 % | WEIGHT: 215 LBS

## 2024-04-29 DIAGNOSIS — D15.1 MYXOMA OF HEART: ICD-10-CM

## 2024-04-29 DIAGNOSIS — I25.10 CORONARY ARTERY DISEASE INVOLVING NATIVE CORONARY ARTERY OF NATIVE HEART WITHOUT ANGINA PECTORIS: ICD-10-CM

## 2024-04-29 DIAGNOSIS — I71.21 ANEURYSM OF ASCENDING AORTA WITHOUT RUPTURE (CMS/HCC): ICD-10-CM

## 2024-04-29 DIAGNOSIS — I35.0 NONRHEUMATIC AORTIC VALVE STENOSIS: Primary | ICD-10-CM

## 2024-04-29 PROCEDURE — G0463 HOSPITAL OUTPT CLINIC VISIT: HCPCS | Performed by: THORACIC SURGERY (CARDIOTHORACIC VASCULAR SURGERY)

## 2024-04-29 PROCEDURE — 99214 OFFICE O/P EST MOD 30 MIN: CPT | Performed by: STUDENT IN AN ORGANIZED HEALTH CARE EDUCATION/TRAINING PROGRAM

## 2024-04-29 ASSESSMENT — ENCOUNTER SYMPTOMS
FALLS: 0
DIAPHORESIS: 0
FOCAL WEAKNESS: 0
ODYNOPHAGIA: 0
CHILLS: 0
NIGHT SWEATS: 0
DEPRESSION: 0
BLACKOUTS: 0
DIARRHEA: 0
SLEEP DISTURBANCES DUE TO BREATHING: 0
TROUBLE SWALLOWING: 0
MEMORY LOSS: 0
DECREASED LIBIDO: 1
SHORTNESS OF BREATH: 0
HEADACHES: 0
FEVER: 0
NAUSEA: 0
WEIGHT GAIN: 0
HEMATOCHEZIA: 0
IRREGULAR HEARTBEAT: 0
BACK PAIN: 0
NEAR-SYNCOPE: 0
DYSURIA: 0
WEIGHT LOSS: 0
PALPITATIONS: 0
SPUTUM PRODUCTION: 0
SYNCOPE: 0
UNUSUAL HAIR DISTRIBUTION: 0
DIZZINESS: 1
SENSORY CHANGE: 0
LOSS OF BALANCE: 1
BRUISES/BLEEDS EASILY: 0
HALLUCINATIONS: 0
POOR WOUND HEALING: 0
COLOR CHANGE: 0
VOMITING: 0
DOUBLE VISION: 0
WEAKNESS: 0
POLYDIPSIA: 0
ORTHOPNEA: 0
PND: 0
TREMORS: 1
JOINT SWELLING: 0
NUMBNESS: 0
SEIZURES: 0
PERSISTENT INFECTIONS: 0
SNORING: 0
HEMATURIA: 0
VISUAL HALOS: 0
NERVOUS/ANXIOUS: 0
LIGHT-HEADEDNESS: 0
CLAUDICATION: 0
MYALGIAS: 0
SUSPICIOUS LESIONS: 0
VISUAL CHANGE: 0
HEMOPTYSIS: 0
ALTERED MENTAL STATUS: 0
DECREASED APPETITE: 0
SORE THROAT: 0

## 2024-04-29 ASSESSMENT — PAIN SCALES - GENERAL: PAINLEVEL: 0-NO PAIN

## 2024-05-06 ENCOUNTER — TELEPHONE (OUTPATIENT)
Dept: CARDIOTHORACIC SURGERY | Facility: CLINIC | Age: 64
End: 2024-05-06
Payer: MEDICARE

## 2024-05-06 NOTE — TELEPHONE ENCOUNTER
Patient called asking about surgery scheduled date. Informed provider,  states possibly around the 28th but not yet confirmed. Informed patient we will call her back once we have a date set.

## 2024-05-24 ENCOUNTER — PREP FOR CASE (OUTPATIENT)
Dept: CARDIOTHORACIC SURGERY | Facility: CLINIC | Age: 64
End: 2024-05-24
Payer: MEDICARE

## 2024-05-24 DIAGNOSIS — I25.119 CORONARY ARTERY DISEASE INVOLVING NATIVE CORONARY ARTERY OF NATIVE HEART WITH ANGINA PECTORIS (CMS/HCC): ICD-10-CM

## 2024-05-24 DIAGNOSIS — D15.1 ATRIAL MYXOMA: Primary | ICD-10-CM

## 2024-05-28 ENCOUNTER — TELEPHONE (OUTPATIENT)
Dept: CARDIOTHORACIC SURGERY | Facility: CLINIC | Age: 64
End: 2024-05-28
Payer: MEDICARE

## 2024-05-28 NOTE — TELEPHONE ENCOUNTER
Jamir called to ask questions about her surgery and the pre admission appointment.  I let Jamir that I have not got her letter put together yet which will give her more information.  I talked with Jamir about amiodarone and when she will need to start to take it.  I talked with Jamir about the powerade drink and that she is to drink 12 oz of it 2-4 hours prior to surgery and if there are any meds that she is to take prior to surgery to take them with the powerade. I let her know that I will be sending her the letter with all of the information needed thru MyChart shortly.  Jamir voiced her understanding of the information given to her and was in agreement with the plan.

## 2024-05-30 ENCOUNTER — TELEPHONE (OUTPATIENT)
Dept: ADMINISTRATIVE | Facility: HOSPITAL | Age: 64
End: 2024-05-30
Payer: MEDICARE

## 2024-06-03 ENCOUNTER — TELEPHONE (OUTPATIENT)
Dept: CARDIOTHORACIC SURGERY | Facility: CLINIC | Age: 64
End: 2024-06-03
Payer: MEDICARE

## 2024-06-03 NOTE — TELEPHONE ENCOUNTER
I called Jamir back at this time to let her know that I talked with Manuela Princess ALEX about taking the Amiodarone with her having fatty liver disease and heart blockages.  I told Jamir that Manuela is aware of the fatty liver disease and heart blockages and she stills stated that it was ok to take the amiodarone for the 3 days.  Jamir voiced her understanding and stated that with all of the information that the pharmacy gave her scared her and that is why she called to check with us.  I let her know that was ok to get that reassurance that it is ok to check with us.  Jamir voiced her understanding of the information given to her.

## 2024-06-12 ENCOUNTER — LAB (OUTPATIENT)
Dept: LAB | Facility: HOSPITAL | Age: 64
DRG: 220 | End: 2024-06-12
Payer: MEDICARE

## 2024-06-12 ENCOUNTER — HOSPITAL ENCOUNTER (OUTPATIENT)
Dept: RADIOLOGY | Facility: HOSPITAL | Age: 64
Discharge: HOME | DRG: 220 | End: 2024-06-12
Payer: MEDICARE

## 2024-06-12 ENCOUNTER — PRE-ADMISSION TESTING (OUTPATIENT)
Dept: PREADMISSION TESTING | Facility: HOSPITAL | Age: 64
DRG: 220 | End: 2024-06-12
Payer: MEDICARE

## 2024-06-12 ENCOUNTER — TELEPHONE (OUTPATIENT)
Dept: CARDIOLOGY | Facility: CLINIC | Age: 64
End: 2024-06-12
Payer: MEDICARE

## 2024-06-12 ENCOUNTER — ANESTHESIA EVENT (OUTPATIENT)
Dept: OPERATING ROOM | Facility: HOSPITAL | Age: 64
DRG: 220 | End: 2024-06-12
Payer: MEDICARE

## 2024-06-12 VITALS
SYSTOLIC BLOOD PRESSURE: 157 MMHG | OXYGEN SATURATION: 95 % | DIASTOLIC BLOOD PRESSURE: 90 MMHG | RESPIRATION RATE: 18 BRPM | WEIGHT: 216 LBS | BODY MASS INDEX: 34.72 KG/M2 | HEIGHT: 66 IN | HEART RATE: 68 BPM | TEMPERATURE: 97.4 F

## 2024-06-12 DIAGNOSIS — N39.0 URINARY TRACT INFECTION WITHOUT HEMATURIA, SITE UNSPECIFIED: Primary | ICD-10-CM

## 2024-06-12 DIAGNOSIS — I35.0 NONRHEUMATIC AORTIC VALVE STENOSIS: ICD-10-CM

## 2024-06-12 DIAGNOSIS — R73.9 HYPERGLYCEMIA: ICD-10-CM

## 2024-06-12 DIAGNOSIS — N39.0 URINARY TRACT INFECTION WITHOUT HEMATURIA, SITE UNSPECIFIED: ICD-10-CM

## 2024-06-12 DIAGNOSIS — E83.42 HYPOMAGNESEMIA: ICD-10-CM

## 2024-06-12 LAB
ABO GROUP (TYPE) IN BLOOD: NORMAL
ALBUMIN SERPL-MCNC: 4.6 G/DL (ref 3.5–5.3)
ALP SERPL-CCNC: 45 U/L (ref 50–130)
ALT SERPL-CCNC: 37 U/L (ref 7–52)
ANION GAP SERPL CALC-SCNC: 9 MMOL/L (ref 3–11)
ANTIBODY SCREEN: NORMAL
AST SERPL-CCNC: 27 U/L
BACTERIA #/AREA URNS HPF: NORMAL /HPF
BASOPHILS # BLD AUTO: 0 10*3/UL
BASOPHILS NFR BLD AUTO: 0.9 % (ref 0–2)
BILIRUB SERPL-MCNC: 0.74 MG/DL (ref 0.2–1.4)
BILIRUB UR QL: NEGATIVE
BUN SERPL-MCNC: 21 MG/DL (ref 7–25)
CALCIUM ALBUM COR SERPL-MCNC: 10 MG/DL (ref 8.6–10.3)
CALCIUM SERPL-MCNC: 10.5 MG/DL (ref 8.6–10.3)
CHLORIDE SERPL-SCNC: 104 MMOL/L (ref 98–107)
CLARITY UR: CLEAR
CO2 SERPL-SCNC: 27 MMOL/L (ref 21–32)
COLOR UR: COLORLESS
CREAT SERPL-MCNC: 0.88 MG/DL (ref 0.6–1.1)
D AG BLD QL: NORMAL
EGFRCR SERPLBLD CKD-EPI 2021: 73 ML/MIN/1.73M*2
EOSINOPHIL # BLD AUTO: 0.1 10*3/UL
EOSINOPHIL NFR BLD AUTO: 2.7 % (ref 0–3)
ERYTHROCYTE [DISTWIDTH] IN BLOOD BY AUTOMATED COUNT: 13.7 % (ref 11.5–14)
EST. AVERAGE GLUCOSE BLD GHB EST-MCNC: 96.8 MG/DL
FIBRINOGEN PPP-MCNC: 309 MG/DL (ref 200–393)
GLUCOSE SERPL-MCNC: 91 MG/DL (ref 70–105)
GLUCOSE UR QL: NEGATIVE MG/DL
HBA1C MFR BLD: 5 % (ref 4–6)
HCT VFR BLD AUTO: 41.8 % (ref 34–45)
HGB BLD-MCNC: 14.7 G/DL (ref 11.5–15.5)
HGB UR QL: NEGATIVE
INR BLD: 0.9
KETONES UR-MCNC: NEGATIVE MG/DL
LEUKOCYTE ESTERASE UR QL STRIP: ABNORMAL
LYMPHOCYTES # BLD AUTO: 2 10*3/UL
LYMPHOCYTES NFR BLD AUTO: 36.6 % (ref 11–47)
MAGNESIUM SERPL-MCNC: 2 MG/DL (ref 1.8–2.4)
MCH RBC QN AUTO: 31.6 PG (ref 28–33)
MCHC RBC AUTO-ENTMCNC: 35.2 G/DL (ref 32–36)
MCV RBC AUTO: 89.8 FL (ref 81–97)
MONOCYTES # BLD AUTO: 0.4 10*3/UL
MONOCYTES NFR BLD AUTO: 6.4 % (ref 3–11)
MRSA DNA SPEC QL NAA+PROBE: NEGATIVE
NEUTROPHILS # BLD AUTO: 2.9 10*3/UL
NEUTROPHILS NFR BLD AUTO: 53.4 % (ref 41–81)
NITRITE UR QL: NEGATIVE
PH UR: 6.5 PH
PLATELET # BLD AUTO: 259 10*3/UL (ref 140–350)
PMV BLD AUTO: 8.2 FL (ref 6.9–10.8)
POTASSIUM SERPL-SCNC: 3.7 MMOL/L (ref 3.5–5.1)
PROT SERPL-MCNC: 7.4 G/DL (ref 6–8.3)
PROT UR STRIP-MCNC: NEGATIVE MG/DL
PROTHROMBIN TIME: 10.6 SECONDS (ref 9.4–12.5)
RBC # BLD AUTO: 4.66 10*6/ΜL (ref 3.7–5.3)
RBC #/AREA URNS HPF: NEGATIVE /HPF
SECOND/CONFIRMATORY ABORH PERFORMED: NORMAL
SODIUM SERPL-SCNC: 140 MMOL/L (ref 135–145)
SP GR UR: 1.01 (ref 1–1.03)
SQUAMOUS #/AREA URNS HPF: NEGATIVE /HPF
UROBILINOGEN UR-MCNC: NORMAL MG/DL
WBC # BLD AUTO: 5.5 10*3/UL (ref 4.5–10.5)
WBC #/AREA URNS HPF: NORMAL /HPF
WBC CLUMPS #/AREA URNS HPF: NORMAL /HPF

## 2024-06-12 PROCEDURE — 83735 ASSAY OF MAGNESIUM: CPT

## 2024-06-12 PROCEDURE — 85384 FIBRINOGEN ACTIVITY: CPT

## 2024-06-12 PROCEDURE — 85025 COMPLETE CBC W/AUTO DIFF WBC: CPT

## 2024-06-12 PROCEDURE — 87077 CULTURE AEROBIC IDENTIFY: CPT

## 2024-06-12 PROCEDURE — 71046 X-RAY EXAM CHEST 2 VIEWS: CPT

## 2024-06-12 PROCEDURE — 80053 COMPREHEN METABOLIC PANEL: CPT

## 2024-06-12 PROCEDURE — 36415 COLL VENOUS BLD VENIPUNCTURE: CPT

## 2024-06-12 PROCEDURE — 81001 URINALYSIS AUTO W/SCOPE: CPT

## 2024-06-12 PROCEDURE — 93005 ELECTROCARDIOGRAM TRACING: CPT | Performed by: STUDENT IN AN ORGANIZED HEALTH CARE EDUCATION/TRAINING PROGRAM

## 2024-06-12 PROCEDURE — 87641 MR-STAPH DNA AMP PROBE: CPT

## 2024-06-12 PROCEDURE — 93010 ELECTROCARDIOGRAM REPORT: CPT | Performed by: INTERNAL MEDICINE

## 2024-06-12 PROCEDURE — 85610 PROTHROMBIN TIME: CPT

## 2024-06-12 PROCEDURE — 86885 COOMBS TEST INDIRECT QUAL: CPT | Performed by: STUDENT IN AN ORGANIZED HEALTH CARE EDUCATION/TRAINING PROGRAM

## 2024-06-12 PROCEDURE — 83036 HEMOGLOBIN GLYCOSYLATED A1C: CPT

## 2024-06-12 RX ORDER — SULFAMETHOXAZOLE AND TRIMETHOPRIM 800; 160 MG/1; MG/1
1 TABLET ORAL 2 TIMES DAILY
Qty: 10 TABLET | Refills: 0 | Status: SHIPPED | OUTPATIENT
Start: 2024-06-12 | End: 2024-06-17 | Stop reason: HOSPADM

## 2024-06-12 NOTE — TELEPHONE ENCOUNTER
Received results and orders for patient.    Called and spoke with patient.  She is informed that the results of her UA show that she is having the start of a UTI.  She is informed that we are sending in a prescription antibiotic for her to take, 2 times a day, including tomorrow morning before surgery, so that we can get rid of the UTI.  She states that she has no symptoms at this time.  She requests that this prescription be sent to the outpatient pharmacy at [facility].

## 2024-06-12 NOTE — PRE-PROCEDURE INSTRUCTIONS
Pre-Surgery Instructions:   Medication Instructions    amiodarone (Pacerone) 200 mg tablet Last dose will be 6/12/24 PM dose    aspirin 81 mg EC tablet Continue as prescribed do not hold    hydroCHLOROthiazide (MICROZIDE) 12.5 mg capsule Continue as prescribed do not hold    omega-3 acid ethyl esters (LOVAZA) 1 gram capsule Stop 1 week prior to surgery/procedure       Please avoid all NSAID's (ibuprofen, aleve, celexicob, etodolac, indomethacin, meloxicam, nabumetone, naproxen) for 7 days prior to procedure.  Acetaminophen (tylenol) is okay to take for discomfort, even on the morning of surgery, if needed.     Please check in at Admissions on 6/13/24 at 5:15 AM.    Admissions is on the south side of the building.  Access is from the [address] entrance. [employer] parking is available from 5:00 am to 5:00 pm Monday through Friday.  The [employer] service is free of charge. If you use the [employer] service, please make arrangements to retrieve your vehicle prior to 5 pm.  After 5 pm, security must be called to retrieve your keys and may delay your departure.    Two people may accompany you when you check in and remain with you until you go for your surgery / procedure. At that time your  may wait in the surgery waiting room or exit the building. We will make sure to have their phone number so they can receive periodic updates and so the doctor can visit with them when the surgery / procedure is finished.   ++++++++++++++++++++++++++++++++++++++++++++++++++++++++++++++++++++++++++++++  Do not eat anything after Midnight prior to surgery (11 pm if surgery is scheduled for 7 am) Please obtain either a Gatorade or a Powerade that is 20 ounces in quantity with approximately 36 g of carbohydrates. Our goal is for you to drink 12 ounces of this drink, approximately a little over half of the bottle.     When you wake up the morning of your surgery, please drink approximately 12 ounces of your carbohydrate loading drink. This  ideally will be 2-4 hours before surgery. Example, if you are scheduled at 0700 you need to complete your drink by 0500.    The goal of this carbohydrate loading drink is to reduce insulin resistance and tissue glycosylation, improve gut motility, and overall reduce lengths of stay.     To decrease bacteria/germs on your skin to prevent infection, your surgeon wants you to complete a shower as instructed below:    Please:   No smoking (vaping, tobacco, marijuana) chewing tobacco, snuff, chewing gum after midnight.  These products increase gastric secretions and could lead to complications.    PRE-OP Shower  1.  Please shower the night before and/or the morning of surgery.  Use [employer] wipes as instructed.   2.  Make sure bed clothing/bed sheets are freshly washed.  3.  Do not use/apply any powders, lotions, deodorants to your skin after your shower.     Other reminders:   If you use a CPAP/BIPAP, bring the mask to the hospital.  Please bring in home oxygen tanks to be used until you can be accessed by a medical professional.   Leave valuables, jewelry, credit cards, money and medications at home.  No makeup, hairspray or nail polish.    Fingernails need to be clean and natural. Remove all nail polish, gel polis, acrylic nails, artificial fingernails, dip powders, wraps, and nail jewelry. Keep your nails clean and natural until your incision/wound is healed. Fingernails that are covered with gel polish or any kind of acrylic/artificial nails hold germs more easily and increase your risk for infection at the surgical site.  For surgeries on the hip, knee, ankle, or foot, also remove nail polish, gel polish, acrylic nails, artificial toenails, dip powders, wraps, and nail jewelry.    Bring storage case for eyeglasses, contact lenses and hearing aids.    Please keep your hospital ID wristband on.     Your surgery is scheduled to begin at 7:00 AM and may last approximately 5 hours and 15 minutes.           Additional  Information    Call your doctor as soon as possible if you get a cold, cough, fever, or have a change in your health since your last visit to your doctor.    If you would like, you may bring your Advance Directive/Living Will to the hospital to be scanned into your record.  You will receive your copy back.      “ You may receive a survey from “Edel Mcintosh” asking you about your visit.  We conduct patient surveys to help us identify opportunities to improve the care we provide.  We would greatly appreciate you taking the time to complete our survey”.    ++++++++++++++++++++++++++++++++++++++++++++++++++++++++++++++++++++++++  [facility] Pre-Admission Department..Monday-Friday 8 a.m. to 4:30 p.m.    Please call us with questions or concerns. [phone].  Messages will be returned.     Admissions:  [phone]    Pre-op:  [phone]    [facility] [place]: [phone]    Patient verbalized understanding and had no further questions.

## 2024-06-13 ENCOUNTER — APPOINTMENT (OUTPATIENT)
Dept: RADIOLOGY | Facility: HOSPITAL | Age: 64
DRG: 220 | End: 2024-06-13
Payer: MEDICARE

## 2024-06-13 ENCOUNTER — APPOINTMENT (OUTPATIENT)
Dept: CARDIOLOGY | Facility: HOSPITAL | Age: 64
DRG: 220 | End: 2024-06-13
Payer: MEDICARE

## 2024-06-13 ENCOUNTER — HOSPITAL ENCOUNTER (INPATIENT)
Facility: HOSPITAL | Age: 64
LOS: 4 days | Discharge: HOME | DRG: 220 | End: 2024-06-17
Attending: THORACIC SURGERY (CARDIOTHORACIC VASCULAR SURGERY) | Admitting: THORACIC SURGERY (CARDIOTHORACIC VASCULAR SURGERY)
Payer: MEDICARE

## 2024-06-13 ENCOUNTER — ANCILLARY PROCEDURE (OUTPATIENT)
Dept: ULTRASOUND IMAGING | Facility: HOSPITAL | Age: 64
DRG: 220 | End: 2024-06-13
Payer: MEDICARE

## 2024-06-13 ENCOUNTER — ANESTHESIA (OUTPATIENT)
Dept: OPERATING ROOM | Facility: HOSPITAL | Age: 64
DRG: 220 | End: 2024-06-13
Payer: MEDICARE

## 2024-06-13 DIAGNOSIS — I25.10 CORONARY ARTERY DISEASE INVOLVING NATIVE HEART WITHOUT ANGINA PECTORIS, UNSPECIFIED VESSEL OR LESION TYPE: Primary | ICD-10-CM

## 2024-06-13 PROBLEM — J95.89 ACUTE RESPIRATORY INSUFFICIENCY, POSTOPERATIVE: Status: ACTIVE | Noted: 2024-06-13

## 2024-06-13 LAB
ALBUMIN SERPL-MCNC: 3.7 G/DL (ref 3.5–5.3)
ANION GAP SERPL CALC-SCNC: 15 MMOL/L (ref 3–11)
APTT PPP: 31.2 SECONDS (ref 25.1–36.5)
APTT PPP: 33.7 SECONDS (ref 25.1–36.5)
BASE EXCESS BLDA CALC-SCNC: -1.1 MMOL/L (ref -2–2)
BASE EXCESS BLDA CALC-SCNC: -1.9 MMOL/L (ref -2–2)
BASE EXCESS BLDA CALC-SCNC: -2 MMOL/L (ref -2–2)
BASE EXCESS BLDA CALC-SCNC: -3 MMOL/L (ref -2–2)
BASE EXCESS BLDA CALC-SCNC: -3 MMOL/L (ref -2–2)
BASE EXCESS BLDA CALC-SCNC: -4 MMOL/L (ref -2–2)
BASE EXCESS BLDA CALC-SCNC: -6 MMOL/L (ref -2–2)
BASE EXCESS BLDA CALC-SCNC: -6 MMOL/L (ref -2–2)
BASOPHILS # BLD AUTO: 0 10*3/UL
BASOPHILS NFR BLD AUTO: 0.4 % (ref 0–2)
BLOOD EXPIRATION DATE: NORMAL
BUN SERPL-MCNC: 17 MG/DL (ref 7–25)
CA-I BLD-SCNC: 0.86 MMOL/L (ref 1.12–1.32)
CA-I BLD-SCNC: 0.91 MMOL/L (ref 1.12–1.32)
CA-I BLD-SCNC: 0.92 MMOL/L (ref 1.12–1.32)
CA-I BLD-SCNC: 0.95 MMOL/L (ref 1.12–1.32)
CA-I BLD-SCNC: 1 MMOL/L (ref 1.12–1.32)
CA-I BLD-SCNC: 1.02 MMOL/L (ref 1.12–1.32)
CA-I BLD-SCNC: 1.06 MMOL/L (ref 1.12–1.32)
CA-I BLD-SCNC: 1.31 MMOL/L (ref 1.12–1.32)
CALCIUM ALBUM COR SERPL-MCNC: 9.7 MG/DL (ref 8.6–10.3)
CALCIUM SERPL-MCNC: 9.5 MG/DL (ref 8.6–10.3)
CALCIUM SERPL-MCNC: 9.5 MG/DL (ref 8.6–10.3)
CHLORIDE SERPL-SCNC: 110 MMOL/L (ref 98–107)
CO2 BLDA-SCNC: 20.1 MMOL/L (ref 19–24)
CO2 BLDA-SCNC: 21 MMOL/L (ref 19–24)
CO2 BLDA-SCNC: 21.3 MMOL/L (ref 19–24)
CO2 BLDA-SCNC: 22 MMOL/L (ref 19–24)
CO2 BLDA-SCNC: 22 MMOL/L (ref 19–24)
CO2 BLDA-SCNC: 23 MMOL/L (ref 19–24)
CO2 BLDA-SCNC: 23 MMOL/L (ref 19–24)
CO2 BLDA-SCNC: 24 MMOL/L (ref 19–24)
CO2 BLDA-SCNC: 24 MMOL/L (ref 19–24)
CO2 BLDA-SCNC: 25 MMOL/L (ref 19–24)
CO2 SERPL-SCNC: 22 MMOL/L (ref 21–32)
CREAT SERPL-MCNC: 0.94 MG/DL (ref 0.6–1.1)
DEVICE (RT): ABNORMAL
DEVICE (RT): NORMAL
DISPENSE STATUS: NORMAL
EGFRCR SERPLBLD CKD-EPI 2021: 67 ML/MIN/1.73M*2
EOSINOPHIL # BLD AUTO: 0 10*3/UL
EOSINOPHIL NFR BLD AUTO: 0.5 % (ref 0–3)
ERYTHROCYTE [DISTWIDTH] IN BLOOD BY AUTOMATED COUNT: 15 % (ref 11.5–14)
ERYTHROCYTE [DISTWIDTH] IN BLOOD BY AUTOMATED COUNT: 15.2 % (ref 11.5–14)
FIBRINOGEN PPP-MCNC: 364 MG/DL (ref 200–393)
FIBRINOGEN PPP-MCNC: 411 MG/DL (ref 200–393)
FIO2: 100 %
FIO2: 40 %
GLUCOSE BLDA-MCNC: 109 MG/DL (ref 70–105)
GLUCOSE BLDA-MCNC: 138 MG/DL (ref 70–105)
GLUCOSE BLDA-MCNC: 149 MG/DL (ref 70–105)
GLUCOSE BLDA-MCNC: 155 MG/DL (ref 70–105)
GLUCOSE BLDA-MCNC: 168 MG/DL (ref 70–105)
GLUCOSE BLDA-MCNC: 170 MG/DL (ref 70–105)
GLUCOSE BLDA-MCNC: 190 MG/DL (ref 70–105)
GLUCOSE BLDA-MCNC: 194 MG/DL (ref 70–105)
GLUCOSE BLDC GLUCOMTR-MCNC: 134 MG/DL (ref 70–105)
GLUCOSE BLDC GLUCOMTR-MCNC: 137 MG/DL (ref 70–105)
GLUCOSE BLDC GLUCOMTR-MCNC: 145 MG/DL (ref 70–105)
GLUCOSE BLDC GLUCOMTR-MCNC: 151 MG/DL (ref 70–105)
GLUCOSE BLDC GLUCOMTR-MCNC: 155 MG/DL (ref 70–105)
GLUCOSE BLDC GLUCOMTR-MCNC: 159 MG/DL (ref 70–105)
GLUCOSE BLDC GLUCOMTR-MCNC: 161 MG/DL (ref 70–105)
GLUCOSE BLDC GLUCOMTR-MCNC: 167 MG/DL (ref 70–105)
GLUCOSE BLDC GLUCOMTR-MCNC: 190 MG/DL (ref 70–105)
GLUCOSE SERPL-MCNC: 147 MG/DL (ref 70–105)
HCO3 BLDA-SCNC: 19.8 MMOL/L (ref 23–29)
HCO3 BLDA-SCNC: 20.3 MMOL/L (ref 23–29)
HCO3 BLDA-SCNC: 20.9 MMOL/L (ref 23–29)
HCO3 BLDA-SCNC: 21.5 MMOL/L (ref 23–29)
HCO3 BLDA-SCNC: 22 MMOL/L (ref 23–29)
HCO3 BLDA-SCNC: 22.1 MMOL/L (ref 23–29)
HCO3 BLDA-SCNC: 22.5 MMOL/L (ref 23–29)
HCO3 BLDA-SCNC: 22.7 MMOL/L (ref 23–29)
HCO3 BLDA-SCNC: 23.2 MMOL/L (ref 23–29)
HCO3 BLDA-SCNC: 23.4 MMOL/L (ref 23–29)
HCT VFR BLD AUTO: 24.1 % (ref 34–45)
HCT VFR BLD AUTO: 31.2 % (ref 34–45)
HCT VFR BLDA CALC: 19 % (ref 38–50)
HCT VFR BLDA CALC: 21 % (ref 38–50)
HCT VFR BLDA CALC: 22 % (ref 38–50)
HCT VFR BLDA CALC: 22 % (ref 38–50)
HCT VFR BLDA CALC: 23 % (ref 38–50)
HCT VFR BLDA CALC: 25 % (ref 38–50)
HCT VFR BLDA CALC: 25 % (ref 38–50)
HCT VFR BLDA CALC: 41 % (ref 38–50)
HGB BLD-MCNC: 11.2 G/DL (ref 11.5–15.5)
HGB BLD-MCNC: 8.5 G/DL (ref 11.5–15.5)
INR BLD: 1
INR BLD: 1
LYMPHOCYTES # BLD AUTO: 0.6 10*3/UL
LYMPHOCYTES NFR BLD AUTO: 11.3 % (ref 11–47)
MAGNESIUM SERPL-MCNC: 3.5 MG/DL (ref 1.8–2.4)
MCH RBC QN AUTO: 30.6 PG (ref 28–33)
MCH RBC QN AUTO: 31.3 PG (ref 28–33)
MCHC RBC AUTO-ENTMCNC: 35.2 G/DL (ref 32–36)
MCHC RBC AUTO-ENTMCNC: 35.9 G/DL (ref 32–36)
MCV RBC AUTO: 86.9 FL (ref 81–97)
MCV RBC AUTO: 87 FL (ref 81–97)
MODE (RT): ABNORMAL
MODE (RT): NORMAL
MONOCYTES # BLD AUTO: 0.2 10*3/UL
MONOCYTES NFR BLD AUTO: 3.7 % (ref 3–11)
MV (RT): 6.33 L/MIN
MV (RT): 6.8 L/MIN
NEUTROPHILS # BLD AUTO: 4.3 10*3/UL
NEUTROPHILS NFR BLD AUTO: 84.1 % (ref 41–81)
P/F RATIO: 219 %
P/F RATIO: 78 %
PATIENT POSITION: ABNORMAL
PATIENT POSITION: NORMAL
PCO2 BLDA: 26.4 MMHG (ref 35–45)
PCO2 BLDA: 31.4 MMHG (ref 35–45)
PCO2 BLDA: 34.6 MMHG (ref 35–45)
PCO2 BLDA: 37.6 MMHG (ref 35–45)
PCO2 BLDA: 39.9 MMHG (ref 35–45)
PCO2 BLDA: 40.1 MMHG (ref 35–45)
PCO2 BLDA: 42.8 MMHG (ref 35–45)
PCO2 BLDA: 48.9 MMHG (ref 35–45)
PCO2 BLDA: 49.6 MMHG (ref 35–45)
PCO2 BLDA: 51.2 MMHG (ref 35–45)
PEEP SET (RT): 10 CM/H2O
PEEP SET (RT): 10 CM/H2O
PH BLDA: 7.24 PH (ref 7.35–7.45)
PH BLDA: 7.25 PH (ref 7.35–7.45)
PH BLDA: 7.26 PH (ref 7.35–7.45)
PH BLDA: 7.31 PH (ref 7.35–7.45)
PH BLDA: 7.35 PH (ref 7.35–7.45)
PH BLDA: 7.37 PH (ref 7.35–7.45)
PH BLDA: 7.38 PH (ref 7.35–7.45)
PH BLDA: 7.4 PH (ref 7.35–7.45)
PH BLDA: 7.46 PH (ref 7.35–7.45)
PH BLDA: 7.48 PH (ref 7.35–7.45)
PIP OBSERVED (RT): 15 CM/H2O
PLATELET # BLD AUTO: 145 10*3/UL (ref 140–350)
PLATELET # BLD AUTO: 153 10*3/UL (ref 140–350)
PMV BLD AUTO: 7.2 FL (ref 6.9–10.8)
PMV BLD AUTO: 7.4 FL (ref 6.9–10.8)
PO2 BLDA: 160 MMHG (ref 60–80)
PO2 BLDA: 268 MMHG (ref 60–80)
PO2 BLDA: 272 MMHG (ref 60–80)
PO2 BLDA: 275 MMHG (ref 60–80)
PO2 BLDA: 329 MMHG (ref 60–80)
PO2 BLDA: 352 MMHG (ref 60–80)
PO2 BLDA: 65 MMHG (ref 60–80)
PO2 BLDA: 77.8 MMHG (ref 60–80)
PO2 BLDA: 87.6 MMHG (ref 60–80)
PO2 BLDA: 90 MMHG (ref 60–80)
POCT HEMOGLOBIN (CALCULATED), ARTERIAL: 13.9 G/DL (ref 11.5–15.5)
POCT HEMOGLOBIN (CALCULATED), ARTERIAL: 6.5 G/DL (ref 11.5–15.5)
POCT HEMOGLOBIN (CALCULATED), ARTERIAL: 7.1 G/DL (ref 11.5–15.5)
POCT HEMOGLOBIN (CALCULATED), ARTERIAL: 7.5 G/DL (ref 11.5–15.5)
POCT HEMOGLOBIN (CALCULATED), ARTERIAL: 7.5 G/DL (ref 11.5–15.5)
POCT HEMOGLOBIN (CALCULATED), ARTERIAL: 7.8 G/DL (ref 11.5–15.5)
POCT HEMOGLOBIN (CALCULATED), ARTERIAL: 8.5 G/DL (ref 11.5–15.5)
POCT HEMOGLOBIN (CALCULATED), ARTERIAL: 8.5 G/DL (ref 11.5–15.5)
POCT HEMOGLOBIN (MEASURED), ARTERIAL: 11.8 G/DL (ref 11.5–15.5)
POCT HEMOGLOBIN (MEASURED), ARTERIAL: 11.8 G/DL (ref 11.5–15.5)
POCT OXYHEMOGLOBIN, ARTERIAL: 94 %
POCT OXYHEMOGLOBIN, ARTERIAL: 95.5 %
POCT SO2, ARTERIAL: 100 % (ref 94–97)
POCT SO2, ARTERIAL: 94 % (ref 94–97)
POCT SO2, ARTERIAL: 95 % (ref 94–97)
POCT SO2, ARTERIAL: 99 % (ref 94–97)
POTASSIUM BLDA-SCNC: 3.4 MMOL/L (ref 3.5–5.1)
POTASSIUM BLDA-SCNC: 3.7 MMOL/L (ref 3.5–5.1)
POTASSIUM BLDA-SCNC: 3.8 MMOL/L (ref 3.5–5.1)
POTASSIUM BLDA-SCNC: 3.9 MMOL/L (ref 3.5–5.1)
POTASSIUM BLDA-SCNC: 4.4 MMOL/L (ref 3.5–5.1)
POTASSIUM BLDA-SCNC: 4.5 MMOL/L (ref 3.5–5.1)
POTASSIUM BLDA-SCNC: 5.1 MMOL/L (ref 3.5–5.1)
POTASSIUM BLDA-SCNC: 5.7 MMOL/L (ref 3.5–5.1)
POTASSIUM SERPL-SCNC: 3.3 MMOL/L (ref 3.5–5.1)
POTASSIUM SERPL-SCNC: 3.6 MMOL/L (ref 3.5–5.1)
PRODUCT BLOOD TYPE: 5100
PRODUCT BLOOD TYPE: 600
PRODUCT BLOOD TYPE: 6200
PRODUCT BLOOD TYPE: 7300
PRODUCT BLOOD TYPE: 9500
PRODUCT BLOOD TYPE: 9500
PRODUCT CODE DESCRIPTION: NORMAL
PRODUCT CODE: NORMAL
PROTHROMBIN TIME: 11.7 SECONDS (ref 9.4–12.5)
PROTHROMBIN TIME: 12 SECONDS (ref 9.4–12.5)
PS (RT): 5 CM/H2O
PS (RT): 5 CM/H2O
RBC # BLD AUTO: 2.78 10*6/ΜL (ref 3.7–5.3)
RBC # BLD AUTO: 3.58 10*6/ΜL (ref 3.7–5.3)
RR SET (RT): 10 B/MIN
RR SET (RT): 16 B/MIN
RR TOTAL (RT): 12 B/MIN
RR TOTAL (RT): 16 B/MIN
SODIUM BLDA-SCNC: 136 MMOL/L (ref 135–145)
SODIUM BLDA-SCNC: 138 MMOL/L (ref 135–145)
SODIUM BLDA-SCNC: 138 MMOL/L (ref 135–145)
SODIUM BLDA-SCNC: 139 MMOL/L (ref 135–145)
SODIUM BLDA-SCNC: 140 MMOL/L (ref 135–145)
SODIUM BLDA-SCNC: 142 MMOL/L (ref 135–145)
SODIUM BLDA-SCNC: 145 MMOL/L (ref 135–145)
SODIUM BLDA-SCNC: 147 MMOL/L (ref 135–145)
SODIUM SERPL-SCNC: 147 MMOL/L (ref 135–145)
SPO2 (RT): 95 %
SPO2 (RT): 96 %
TEG GLOBAL CFF FLEV: 343 MG/DL (ref 278–581)
TEG GLOBAL CFF FLEV: 349 MG/DL (ref 278–581)
TEG GLOBAL CFF MA: 18.8 MM (ref 15–32)
TEG GLOBAL CFF MA: 19.1 MM (ref 15–32)
TEG GLOBAL CK ANGLE: 73.7 DEGREES (ref 63–78)
TEG GLOBAL CK ANGLE: 73.8 DEGREES (ref 63–78)
TEG GLOBAL CK K: 1.2 MIN (ref 0.8–2.1)
TEG GLOBAL CK K: 1.3 MIN (ref 0.8–2.1)
TEG GLOBAL CK MA: 60.2 MM (ref 52–69)
TEG GLOBAL CK MA: 61.3 MM (ref 52–69)
TEG GLOBAL CK R: 5.2 MIN (ref 4.6–9.1)
TEG GLOBAL CK R: 8.4 MIN (ref 4.6–9.1)
TEG GLOBAL CKH R: 5.1 MIN (ref 4.3–8.3)
TEG GLOBAL CKH R: 6.7 MIN (ref 4.3–8.3)
TEG GLOBAL CRT MA: 59.4 MM (ref 52–70)
TEG GLOBAL CRT MA: 61.2 MM (ref 52–70)
TEG PLTMAP AA % AGGREGATION: 21 % (ref 89–100)
TEG PLTMAP AA % INHIBITION: 79 % (ref 0–11)
TEG PLTMAP AA MA: 50.7 MM (ref 51–71)
TEG PLTMAP ACTF MA: 8 MM (ref 2–19)
TEG PLTMAP ADP % AGGREGATION: 99 % (ref 83–100)
TEG PLTMAP ADP % INHIBITION: 1 % (ref 0–17)
TEG PLTMAP ADP MA: 61.4 MM (ref 45–69)
TEG PLTMAP HKH MA: 61.9 MM (ref 53–68)
UNIT BARCODED ABO/RH: NORMAL
UNIT NUMBER: NORMAL
UNIT RH: NORMAL
VT OBSERVED (RT): 411 ML
VT OBSERVED (RT): 455 ML
VT SET (RT): 400 ML
VT SET (RT): 450 ML
WBC # BLD AUTO: 5.1 10*3/UL (ref 4.5–10.5)
WBC # BLD AUTO: 5.5 10*3/UL (ref 4.5–10.5)

## 2024-06-13 PROCEDURE — 2500000200 HC RX 250 WO HCPCS: Performed by: NURSE ANESTHETIST, CERTIFIED REGISTERED

## 2024-06-13 PROCEDURE — 2590000100 HC RX 259

## 2024-06-13 PROCEDURE — 85576 BLOOD PLATELET AGGREGATION: CPT | Performed by: STUDENT IN AN ORGANIZED HEALTH CARE EDUCATION/TRAINING PROGRAM

## 2024-06-13 PROCEDURE — C1889 IMPLANT/INSERT DEVICE, NOC: HCPCS | Performed by: THORACIC SURGERY (CARDIOTHORACIC VASCULAR SURGERY)

## 2024-06-13 PROCEDURE — (BLANK) HC OR LEVEL 6 PROC 1ST 15MIN: Performed by: THORACIC SURGERY (CARDIOTHORACIC VASCULAR SURGERY)

## 2024-06-13 PROCEDURE — 80047 BASIC METABLC PNL IONIZED CA: CPT

## 2024-06-13 PROCEDURE — (BLANK) HC GENERAL ANESTHESIA FACILITY CHARGE EACH ADDITIONAL MIN: Performed by: THORACIC SURGERY (CARDIOTHORACIC VASCULAR SURGERY)

## 2024-06-13 PROCEDURE — 85384 FIBRINOGEN ACTIVITY: CPT

## 2024-06-13 PROCEDURE — C9248 INJ, CLEVIDIPINE BUTYRATE: HCPCS | Performed by: NURSE ANESTHETIST, CERTIFIED REGISTERED

## 2024-06-13 PROCEDURE — (BLANK) HC ROOM ICU MEDICAL

## 2024-06-13 PROCEDURE — C1779 LEAD, PMKR, TRANSVENOUS VDD: HCPCS | Performed by: THORACIC SURGERY (CARDIOTHORACIC VASCULAR SURGERY)

## 2024-06-13 PROCEDURE — P9045 ALBUMIN (HUMAN), 5%, 250 ML: HCPCS | Mod: TB,JZ

## 2024-06-13 PROCEDURE — (BLANK) HC OR LEVEL 6 PROC EACH ADDITIONAL MIN: Performed by: THORACIC SURGERY (CARDIOTHORACIC VASCULAR SURGERY)

## 2024-06-13 PROCEDURE — 94640 AIRWAY INHALATION TREATMENT: CPT

## 2024-06-13 PROCEDURE — 82805 BLOOD GASES W/O2 SATURATION: CPT

## 2024-06-13 PROCEDURE — 84132 ASSAY OF SERUM POTASSIUM: CPT

## 2024-06-13 PROCEDURE — 85025 COMPLETE CBC W/AUTO DIFF WBC: CPT | Performed by: THORACIC SURGERY (CARDIOTHORACIC VASCULAR SURGERY)

## 2024-06-13 PROCEDURE — P9012 CRYOPRECIPITATE EACH UNIT: HCPCS | Performed by: THORACIC SURGERY (CARDIOTHORACIC VASCULAR SURGERY)

## 2024-06-13 PROCEDURE — 93010 ELECTROCARDIOGRAM REPORT: CPT | Mod: NCNR | Performed by: INTERNAL MEDICINE

## 2024-06-13 PROCEDURE — 85730 THROMBOPLASTIN TIME PARTIAL: CPT

## 2024-06-13 PROCEDURE — 36415 COLL VENOUS BLD VENIPUNCTURE: CPT | Performed by: STUDENT IN AN ORGANIZED HEALTH CARE EDUCATION/TRAINING PROGRAM

## 2024-06-13 PROCEDURE — 6370000100 HC RX 637 (ALT 250 FOR IP): Performed by: THORACIC SURGERY (CARDIOTHORACIC VASCULAR SURGERY)

## 2024-06-13 PROCEDURE — 6360000200 HC RX 636 W HCPCS (ALT 250 FOR IP)

## 2024-06-13 PROCEDURE — 36430 TRANSFUSION BLD/BLD COMPNT: CPT

## 2024-06-13 PROCEDURE — 2500000200 HC RX 250 WO HCPCS: Performed by: THORACIC SURGERY (CARDIOTHORACIC VASCULAR SURGERY)

## 2024-06-13 PROCEDURE — 85384 FIBRINOGEN ACTIVITY: CPT | Performed by: THORACIC SURGERY (CARDIOTHORACIC VASCULAR SURGERY)

## 2024-06-13 PROCEDURE — 94003 VENT MGMT INPAT SUBQ DAY: CPT

## 2024-06-13 PROCEDURE — C1769 GUIDE WIRE: HCPCS | Performed by: THORACIC SURGERY (CARDIOTHORACIC VASCULAR SURGERY)

## 2024-06-13 PROCEDURE — 71045 X-RAY EXAM CHEST 1 VIEW: CPT

## 2024-06-13 PROCEDURE — 93005 ELECTROCARDIOGRAM TRACING: CPT

## 2024-06-13 PROCEDURE — 02RX0JZ REPLACEMENT OF THORACIC AORTA, ASCENDING/ARCH WITH SYNTHETIC SUBSTITUTE, OPEN APPROACH: ICD-10-PCS | Performed by: THORACIC SURGERY (CARDIOTHORACIC VASCULAR SURGERY)

## 2024-06-13 PROCEDURE — 86923 COMPATIBILITY TEST ELECTRIC: CPT | Performed by: THORACIC SURGERY (CARDIOTHORACIC VASCULAR SURGERY)

## 2024-06-13 PROCEDURE — 76937 US GUIDE VASCULAR ACCESS: CPT | Performed by: NURSE ANESTHETIST, CERTIFIED REGISTERED

## 2024-06-13 PROCEDURE — P9047 ALBUMIN (HUMAN), 25%, 50ML: HCPCS | Mod: TB,JZ | Performed by: THORACIC SURGERY (CARDIOTHORACIC VASCULAR SURGERY)

## 2024-06-13 PROCEDURE — 33859 AS-AORT GRF F/DS OTH/THN DSJ: CPT | Performed by: THORACIC SURGERY (CARDIOTHORACIC VASCULAR SURGERY)

## 2024-06-13 PROCEDURE — 2580000300 HC RX 258: Performed by: NURSE ANESTHETIST, CERTIFIED REGISTERED

## 2024-06-13 PROCEDURE — 83735 ASSAY OF MAGNESIUM: CPT

## 2024-06-13 PROCEDURE — 6360000200 HC RX 636 W HCPCS (ALT 250 FOR IP): Performed by: NURSE ANESTHETIST, CERTIFIED REGISTERED

## 2024-06-13 PROCEDURE — 02R508Z REPLACEMENT OF ATRIAL SEPTUM WITH ZOOPLASTIC TISSUE, OPEN APPROACH: ICD-10-PCS | Performed by: THORACIC SURGERY (CARDIOTHORACIC VASCULAR SURGERY)

## 2024-06-13 PROCEDURE — C1713 ANCHOR/SCREW BN/BN,TIS/BN: HCPCS | Performed by: THORACIC SURGERY (CARDIOTHORACIC VASCULAR SURGERY)

## 2024-06-13 PROCEDURE — C1768 GRAFT, VASCULAR: HCPCS | Performed by: THORACIC SURGERY (CARDIOTHORACIC VASCULAR SURGERY)

## 2024-06-13 PROCEDURE — 85347 COAGULATION TIME ACTIVATED: CPT | Performed by: THORACIC SURGERY (CARDIOTHORACIC VASCULAR SURGERY)

## 2024-06-13 PROCEDURE — 2500000200 HC RX 250 WO HCPCS

## 2024-06-13 PROCEDURE — 5A1221Z PERFORMANCE OF CARDIAC OUTPUT, CONTINUOUS: ICD-10-PCS | Performed by: THORACIC SURGERY (CARDIOTHORACIC VASCULAR SURGERY)

## 2024-06-13 PROCEDURE — 33508 ENDOSCOPIC VEIN HARVEST: CPT | Mod: 59 | Performed by: THORACIC SURGERY (CARDIOTHORACIC VASCULAR SURGERY)

## 2024-06-13 PROCEDURE — (BLANK) HC GENERAL ANESTHESIA FACILITY CHARGE 1ST 15 MIN: Performed by: THORACIC SURGERY (CARDIOTHORACIC VASCULAR SURGERY)

## 2024-06-13 PROCEDURE — P9017 PLASMA 1 DONOR FRZ W/IN 8 HR: HCPCS | Performed by: THORACIC SURGERY (CARDIOTHORACIC VASCULAR SURGERY)

## 2024-06-13 PROCEDURE — 02100Z9 BYPASS CORONARY ARTERY, ONE ARTERY FROM LEFT INTERNAL MAMMARY, OPEN APPROACH: ICD-10-PCS | Performed by: THORACIC SURGERY (CARDIOTHORACIC VASCULAR SURGERY)

## 2024-06-13 PROCEDURE — 94002 VENT MGMT INPAT INIT DAY: CPT

## 2024-06-13 PROCEDURE — 33999 UNLISTED PX CARDIAC SURGERY: CPT | Performed by: THORACIC SURGERY (CARDIOTHORACIC VASCULAR SURGERY)

## 2024-06-13 PROCEDURE — 3610001200 HC AUTOTRANSFUSION: Performed by: THORACIC SURGERY (CARDIOTHORACIC VASCULAR SURGERY)

## 2024-06-13 PROCEDURE — C9248 INJ, CLEVIDIPINE BUTYRATE: HCPCS

## 2024-06-13 PROCEDURE — 6360000200 HC RX 636 W HCPCS (ALT 250 FOR IP): Performed by: STUDENT IN AN ORGANIZED HEALTH CARE EDUCATION/TRAINING PROGRAM

## 2024-06-13 PROCEDURE — 33517 CABG ARTERY-VEIN SINGLE: CPT | Performed by: THORACIC SURGERY (CARDIOTHORACIC VASCULAR SURGERY)

## 2024-06-13 PROCEDURE — 85027 COMPLETE CBC AUTOMATED: CPT

## 2024-06-13 PROCEDURE — 00563 ANES PX HEART W/PMP CRC ARST: CPT | Performed by: NURSE ANESTHETIST, CERTIFIED REGISTERED

## 2024-06-13 PROCEDURE — C1729 CATH, DRAINAGE: HCPCS | Performed by: THORACIC SURGERY (CARDIOTHORACIC VASCULAR SURGERY)

## 2024-06-13 PROCEDURE — 02B50ZZ EXCISION OF ATRIAL SEPTUM, OPEN APPROACH: ICD-10-PCS | Performed by: THORACIC SURGERY (CARDIOTHORACIC VASCULAR SURGERY)

## 2024-06-13 PROCEDURE — 85610 PROTHROMBIN TIME: CPT

## 2024-06-13 PROCEDURE — 36410 VNPNXR 3YR/> PHY/QHP DX/THER: CPT | Performed by: NURSE ANESTHETIST, CERTIFIED REGISTERED

## 2024-06-13 PROCEDURE — 2720000000 HC SUPP 272 WO HCPCS: Performed by: THORACIC SURGERY (CARDIOTHORACIC VASCULAR SURGERY)

## 2024-06-13 PROCEDURE — 33120 EXC ICAR TUM RESC W/CARD BYP: CPT | Performed by: THORACIC SURGERY (CARDIOTHORACIC VASCULAR SURGERY)

## 2024-06-13 PROCEDURE — 6360000200 HC RX 636 W HCPCS (ALT 250 FOR IP): Performed by: THORACIC SURGERY (CARDIOTHORACIC VASCULAR SURGERY)

## 2024-06-13 PROCEDURE — 0210093 BYPASS CORONARY ARTERY, ONE ARTERY FROM CORONARY ARTERY WITH AUTOLOGOUS VENOUS TISSUE, OPEN APPROACH: ICD-10-PCS | Performed by: THORACIC SURGERY (CARDIOTHORACIC VASCULAR SURGERY)

## 2024-06-13 PROCEDURE — 88323 CONSLTJ&REPRT MATRL PREP SLD: CPT

## 2024-06-13 PROCEDURE — 2580000300 HC RX 258

## 2024-06-13 PROCEDURE — 33533 CABG ARTERIAL SINGLE: CPT | Mod: 51 | Performed by: THORACIC SURGERY (CARDIOTHORACIC VASCULAR SURGERY)

## 2024-06-13 PROCEDURE — 99223 1ST HOSP IP/OBS HIGH 75: CPT | Mod: AI | Performed by: INTERNAL MEDICINE

## 2024-06-13 PROCEDURE — 36620 INSERTION CATHETER ARTERY: CPT | Performed by: NURSE ANESTHETIST, CERTIFIED REGISTERED

## 2024-06-13 PROCEDURE — 94799 UNLISTED PULMONARY SVC/PX: CPT

## 2024-06-13 PROCEDURE — 85610 PROTHROMBIN TIME: CPT | Performed by: THORACIC SURGERY (CARDIOTHORACIC VASCULAR SURGERY)

## 2024-06-13 PROCEDURE — 88342 IMHCHEM/IMCYTCHM 1ST ANTB: CPT

## 2024-06-13 PROCEDURE — 82310 ASSAY OF CALCIUM: CPT

## 2024-06-13 PROCEDURE — 2580000300 HC RX 258: Performed by: STUDENT IN AN ORGANIZED HEALTH CARE EDUCATION/TRAINING PROGRAM

## 2024-06-13 PROCEDURE — A4206 1 CC STERILE SYRINGE&NEEDLE: HCPCS | Performed by: NURSE ANESTHETIST, CERTIFIED REGISTERED

## 2024-06-13 PROCEDURE — 76937 US GUIDE VASCULAR ACCESS: CPT | Mod: 59 | Performed by: NURSE ANESTHETIST, CERTIFIED REGISTERED

## 2024-06-13 PROCEDURE — 88305 TISSUE EXAM BY PATHOLOGIST: CPT

## 2024-06-13 PROCEDURE — P9045 ALBUMIN (HUMAN), 5%, 250 ML: HCPCS | Mod: TB,JZ | Performed by: THORACIC SURGERY (CARDIOTHORACIC VASCULAR SURGERY)

## 2024-06-13 PROCEDURE — C1762 CONN TISS, HUMAN(INC FASCIA): HCPCS | Performed by: THORACIC SURGERY (CARDIOTHORACIC VASCULAR SURGERY)

## 2024-06-13 PROCEDURE — P9016 RBC LEUKOCYTES REDUCED: HCPCS | Performed by: THORACIC SURGERY (CARDIOTHORACIC VASCULAR SURGERY)

## 2024-06-13 PROCEDURE — P9035 PLATELET PHERES LEUKOREDUCED: HCPCS | Performed by: THORACIC SURGERY (CARDIOTHORACIC VASCULAR SURGERY)

## 2024-06-13 PROCEDURE — C1781 MESH (IMPLANTABLE): HCPCS | Performed by: THORACIC SURGERY (CARDIOTHORACIC VASCULAR SURGERY)

## 2024-06-13 PROCEDURE — 88341 IMHCHEM/IMCYTCHM EA ADD ANTB: CPT

## 2024-06-13 PROCEDURE — 85730 THROMBOPLASTIN TIME PARTIAL: CPT | Performed by: THORACIC SURGERY (CARDIOTHORACIC VASCULAR SURGERY)

## 2024-06-13 PROCEDURE — 84132 ASSAY OF SERUM POTASSIUM: CPT | Performed by: THORACIC SURGERY (CARDIOTHORACIC VASCULAR SURGERY)

## 2024-06-13 PROCEDURE — 6370000100 HC RX 637 (ALT 250 FOR IP)

## 2024-06-13 PROCEDURE — 06BP4ZZ EXCISION OF RIGHT SAPHENOUS VEIN, PERCUTANEOUS ENDOSCOPIC APPROACH: ICD-10-PCS | Performed by: THORACIC SURGERY (CARDIOTHORACIC VASCULAR SURGERY)

## 2024-06-13 PROCEDURE — 88304 TISSUE EXAM BY PATHOLOGIST: CPT

## 2024-06-13 PROCEDURE — 82947 ASSAY GLUCOSE BLOOD QUANT: CPT | Mod: QW

## 2024-06-13 DEVICE — WIRE 6495F TEMP PACE BIP MYO 6PK 26L
Type: IMPLANTABLE DEVICE | Site: HEART | Status: FUNCTIONAL
Brand: STREAMLINE™

## 2024-06-13 DEVICE — COSEAL SURGICAL SEALANT (COSEAL) IS COMPOSED OF TWO SYNTHETIC POLYETHYLENE GLYCOLS (PEGS), A DILUTE HYDROGEN CHLORIDE SOLUTION AND A SODIUM PHOSPHATE/SODIUM CARBONATE SOLUTION. THESE COMPONENTS COME IN A KIT THAT INCLUDES AN APPLICATOR(S). AT THE TIME OF ADMINISTRATION, THE MIXED PEGS AND SOLUTIONS FORM A HYDROGEL THAT ADHERES TO TISSUE, SYNTHETIC GRAFT MATERIALS AND COVALENTLY BONDS TO ITSELF
Type: IMPLANTABLE DEVICE | Site: HEART | Status: FUNCTIONAL
Brand: COSEAL SURGICAL SEALANT

## 2024-06-13 DEVICE — BARD® PTFE FELT, 15.2 CM X 15.2 CM
Type: IMPLANTABLE DEVICE | Site: HEART | Status: FUNCTIONAL
Brand: BARD® PTFE FELT

## 2024-06-13 DEVICE — SCREW LOCKING 14MM BLUE: Type: IMPLANTABLE DEVICE | Site: STERNUM | Status: FUNCTIONAL

## 2024-06-13 DEVICE — SUTURE STERNAL DOUBLE WIRE #6 14 INCH: Type: IMPLANTABLE DEVICE | Site: STERNUM | Status: FUNCTIONAL

## 2024-06-13 DEVICE — GRAFT VASCULAR BRANCH SINGLE 28MMX50CM 10MM HEMASHEILD PLATINUM WOVEN AORTIC ARCH: Type: IMPLANTABLE DEVICE | Site: AORTA | Status: FUNCTIONAL

## 2024-06-13 DEVICE — WAX HEMOSTASIS BONE 2.5GM BIODISSOLVABLE ABSORB BONE WAX: Type: IMPLANTABLE DEVICE | Site: STERNUM | Status: FUNCTIONAL

## 2024-06-13 DEVICE — CORONARY ARTERY BYPASS GRAFT MARKERS, STAINLESS STEEL, DISTAL, WITH HOLDER
Type: IMPLANTABLE DEVICE | Site: HEART | Status: FUNCTIONAL
Brand: ANASTOMARK CORONARY ARTERY BYPASS GRAFT MARKERS, STAINLESS STEEL, DISTAL

## 2024-06-13 DEVICE — WIRE STERNAL 49CM 1/2 CIRCLE CUT NDL #6 WIRE: Type: IMPLANTABLE DEVICE | Site: STERNUM | Status: FUNCTIONAL

## 2024-06-13 DEVICE — PLATE V 4 HOLES: Type: IMPLANTABLE DEVICE | Site: STERNUM | Status: FUNCTIONAL

## 2024-06-13 DEVICE — XENOSURE BIOLOGIC PATCH, 6CM X 8CM, EIFU
Type: IMPLANTABLE DEVICE | Site: HEART | Status: FUNCTIONAL
Brand: XENOSURE BIOLOGIC PATCH

## 2024-06-13 DEVICE — SCREW LOCKING 16MM BLUE: Type: IMPLANTABLE DEVICE | Site: STERNUM | Status: FUNCTIONAL

## 2024-06-13 DEVICE — PLATE H 6 HOLES: Type: IMPLANTABLE DEVICE | Site: STERNUM | Status: FUNCTIONAL

## 2024-06-13 DEVICE — POLYMER PLEDGET PTFE SUTURE BUTRESS
Type: IMPLANTABLE DEVICE | Site: HEART | Status: FUNCTIONAL
Brand: ETHICON

## 2024-06-13 RX ORDER — SODIUM CHLORIDE 0.9 % (FLUSH) 0.9 %
10 SYRINGE (ML) INJECTION AS NEEDED
Status: DISCONTINUED | OUTPATIENT
Start: 2024-06-13 | End: 2024-06-13 | Stop reason: HOSPADM

## 2024-06-13 RX ORDER — DEXMEDETOMIDINE HYDROCHLORIDE 4 UG/ML
.2-1.5 INJECTION, SOLUTION INTRAVENOUS
Status: DISCONTINUED | OUTPATIENT
Start: 2024-06-13 | End: 2024-06-16

## 2024-06-13 RX ORDER — MAGNESIUM SULFATE HEPTAHYDRATE 40 MG/ML
2 INJECTION, SOLUTION INTRAVENOUS AS NEEDED
Status: DISCONTINUED | OUTPATIENT
Start: 2024-06-13 | End: 2024-06-17 | Stop reason: HOSPADM

## 2024-06-13 RX ORDER — ADHESIVE BANDAGE
30 BANDAGE TOPICAL DAILY PRN
Status: DISCONTINUED | OUTPATIENT
Start: 2024-06-13 | End: 2024-06-17 | Stop reason: HOSPADM

## 2024-06-13 RX ORDER — CALCIUM GLUCONATE 20 MG/ML
2 INJECTION, SOLUTION INTRAVENOUS
Status: DISCONTINUED | OUTPATIENT
Start: 2024-06-13 | End: 2024-06-17 | Stop reason: HOSPADM

## 2024-06-13 RX ORDER — MIDAZOLAM HYDROCHLORIDE 1 MG/ML
INJECTION INTRAMUSCULAR; INTRAVENOUS AS NEEDED
Status: DISCONTINUED | OUTPATIENT
Start: 2024-06-13 | End: 2024-06-13 | Stop reason: SURG

## 2024-06-13 RX ORDER — BISACODYL 10 MG/1
10 SUPPOSITORY RECTAL EVERY 6 HOURS PRN
Status: DISCONTINUED | OUTPATIENT
Start: 2024-06-13 | End: 2024-06-17 | Stop reason: HOSPADM

## 2024-06-13 RX ORDER — DEXAMETHASONE SODIUM PHOSPHATE 4 MG/ML
4 INJECTION, SOLUTION INTRA-ARTICULAR; INTRALESIONAL; INTRAMUSCULAR; INTRAVENOUS; SOFT TISSUE ONCE
Status: COMPLETED | OUTPATIENT
Start: 2024-06-13 | End: 2024-06-13

## 2024-06-13 RX ORDER — HEPARIN SODIUM 1000 [USP'U]/ML
INJECTION, SOLUTION INTRAVENOUS; SUBCUTANEOUS AS NEEDED
Status: DISCONTINUED | OUTPATIENT
Start: 2024-06-13 | End: 2024-06-13 | Stop reason: SURG

## 2024-06-13 RX ORDER — VECURONIUM BROMIDE 1 MG/ML
INJECTION, POWDER, LYOPHILIZED, FOR SOLUTION INTRAVENOUS AS NEEDED
Status: DISCONTINUED | OUTPATIENT
Start: 2024-06-13 | End: 2024-06-13 | Stop reason: SURG

## 2024-06-13 RX ORDER — FENTANYL CITRATE/PF 50 MCG/ML
PLASTIC BAG, INJECTION (ML) INTRAVENOUS AS NEEDED
Status: DISCONTINUED | OUTPATIENT
Start: 2024-06-13 | End: 2024-06-13 | Stop reason: SURG

## 2024-06-13 RX ORDER — CALCIUM CHLORIDE 100 MG/ML
INJECTION, SOLUTION INTRAVENOUS AS NEEDED
Status: DISCONTINUED | OUTPATIENT
Start: 2024-06-13 | End: 2024-06-13 | Stop reason: SURG

## 2024-06-13 RX ORDER — SODIUM CHLORIDE 0.9 % (FLUSH) 0.9 %
2 SYRINGE (ML) INJECTION EVERY 8 HOURS SCHEDULED
Status: DISCONTINUED | OUTPATIENT
Start: 2024-06-13 | End: 2024-06-13 | Stop reason: HOSPADM

## 2024-06-13 RX ORDER — AMINOCAPROIC ACID 250 MG/ML
INJECTION, SOLUTION INTRAVENOUS AS NEEDED
Status: DISCONTINUED | OUTPATIENT
Start: 2024-06-13 | End: 2024-06-13 | Stop reason: SURG

## 2024-06-13 RX ORDER — ETHYL ALCOHOL 62 ML/100ML
1 SWAB TOPICAL 2 TIMES DAILY
Status: DISCONTINUED | OUTPATIENT
Start: 2024-06-13 | End: 2024-06-17 | Stop reason: HOSPADM

## 2024-06-13 RX ORDER — SODIUM CHLORIDE 9 MG/ML
25-50 INJECTION, SOLUTION INTRAVENOUS AS NEEDED
Status: DISCONTINUED | OUTPATIENT
Start: 2024-06-13 | End: 2024-06-17 | Stop reason: HOSPADM

## 2024-06-13 RX ORDER — PHENYLEPHRINE HYDROCHLORIDE 10 MG/ML
INJECTION INTRAVENOUS AS NEEDED
Status: DISCONTINUED | OUTPATIENT
Start: 2024-06-13 | End: 2024-06-13 | Stop reason: SURG

## 2024-06-13 RX ORDER — PAPAVERINE HYDROCHLORIDE 30 MG/ML
INJECTION INTRAMUSCULAR; INTRAVENOUS AS NEEDED
Status: DISCONTINUED | OUTPATIENT
Start: 2024-06-13 | End: 2024-06-13 | Stop reason: HOSPADM

## 2024-06-13 RX ORDER — DOBUTAMINE HYDROCHLORIDE 200 MG/100ML
2.5-2 INJECTION INTRAVENOUS
Status: DISCONTINUED | OUTPATIENT
Start: 2024-06-13 | End: 2024-06-16

## 2024-06-13 RX ORDER — CLOPIDOGREL BISULFATE 75 MG/1
75 TABLET ORAL DAILY
Status: DISCONTINUED | OUTPATIENT
Start: 2024-06-14 | End: 2024-06-17 | Stop reason: HOSPADM

## 2024-06-13 RX ORDER — POTASSIUM CHLORIDE 29.8 MG/ML
20 INJECTION INTRAVENOUS ONCE
Status: COMPLETED | OUTPATIENT
Start: 2024-06-13 | End: 2024-06-13

## 2024-06-13 RX ORDER — IPRATROPIUM BROMIDE 0.5 MG/2.5ML
0.5 SOLUTION RESPIRATORY (INHALATION) EVERY 4 HOURS PRN
Status: DISCONTINUED | OUTPATIENT
Start: 2024-06-13 | End: 2024-06-17 | Stop reason: HOSPADM

## 2024-06-13 RX ORDER — CALC/MAG/B COMPLEX/D3/HERB 61
15 TABLET ORAL
Status: DISCONTINUED | OUTPATIENT
Start: 2024-06-13 | End: 2024-06-17 | Stop reason: HOSPADM

## 2024-06-13 RX ORDER — SODIUM CHLORIDE 9 MG/ML
INJECTION, SOLUTION INTRAVENOUS CONTINUOUS PRN
Status: DISCONTINUED | OUTPATIENT
Start: 2024-06-13 | End: 2024-06-13 | Stop reason: SURG

## 2024-06-13 RX ORDER — NOREPINEPHRINE BITARTRATE 0.03 MG/ML
INJECTION, SOLUTION INTRAVENOUS CONTINUOUS PRN
Status: DISCONTINUED | OUTPATIENT
Start: 2024-06-13 | End: 2024-06-13 | Stop reason: SURG

## 2024-06-13 RX ORDER — TRAMADOL HYDROCHLORIDE 50 MG/1
50 TABLET ORAL EVERY 6 HOURS PRN
Status: DISCONTINUED | OUTPATIENT
Start: 2024-06-13 | End: 2024-06-17 | Stop reason: HOSPADM

## 2024-06-13 RX ORDER — ALBUMIN HUMAN 50 G/1000ML
12.5 SOLUTION INTRAVENOUS AS NEEDED
Status: DISPENSED | OUTPATIENT
Start: 2024-06-13 | End: 2024-06-14

## 2024-06-13 RX ORDER — POLYETHYLENE GLYCOL (3350) 17 G/17G
17 POWDER, FOR SOLUTION ORAL DAILY
Status: DISCONTINUED | OUTPATIENT
Start: 2024-06-13 | End: 2024-06-17 | Stop reason: HOSPADM

## 2024-06-13 RX ORDER — SODIUM CHLORIDE, SODIUM LACTATE, POTASSIUM CHLORIDE, CALCIUM CHLORIDE 600; 310; 30; 20 MG/100ML; MG/100ML; MG/100ML; MG/100ML
100 INJECTION, SOLUTION INTRAVENOUS CONTINUOUS
Status: DISCONTINUED | OUTPATIENT
Start: 2024-06-13 | End: 2024-06-13 | Stop reason: HOSPADM

## 2024-06-13 RX ORDER — HYDRALAZINE HYDROCHLORIDE 20 MG/ML
5-10 INJECTION INTRAMUSCULAR; INTRAVENOUS
Status: DISCONTINUED | OUTPATIENT
Start: 2024-06-13 | End: 2024-06-17 | Stop reason: HOSPADM

## 2024-06-13 RX ORDER — MIDAZOLAM HYDROCHLORIDE 1 MG/ML
1 INJECTION INTRAMUSCULAR; INTRAVENOUS EVERY 5 MIN PRN
Status: DISCONTINUED | OUTPATIENT
Start: 2024-06-13 | End: 2024-06-13 | Stop reason: HOSPADM

## 2024-06-13 RX ORDER — MILRINONE LACTATE 0.2 MG/ML
.0625-.75 INJECTION, SOLUTION INTRAVENOUS CONTINUOUS
Status: DISCONTINUED | OUTPATIENT
Start: 2024-06-13 | End: 2024-06-16

## 2024-06-13 RX ORDER — DEXTROSE 40 %
15 GEL (GRAM) ORAL
Status: DISCONTINUED | OUTPATIENT
Start: 2024-06-13 | End: 2024-06-17 | Stop reason: HOSPADM

## 2024-06-13 RX ORDER — LEVALBUTEROL INHALATION SOLUTION 1.25 MG/3ML
1.25 SOLUTION RESPIRATORY (INHALATION) EVERY 4 HOURS PRN
Status: DISCONTINUED | OUTPATIENT
Start: 2024-06-13 | End: 2024-06-17 | Stop reason: HOSPADM

## 2024-06-13 RX ORDER — ACETAMINOPHEN 650 MG/20.3ML
650 LIQUID ORAL EVERY 6 HOURS SCHEDULED
Status: DISCONTINUED | OUTPATIENT
Start: 2024-06-13 | End: 2024-06-17 | Stop reason: HOSPADM

## 2024-06-13 RX ORDER — MORPHINE SULFATE 2 MG/ML
1-2 INJECTION, SOLUTION INTRAMUSCULAR; INTRAVENOUS
Status: DISCONTINUED | OUTPATIENT
Start: 2024-06-13 | End: 2024-06-14

## 2024-06-13 RX ORDER — ENOXAPARIN SODIUM 100 MG/ML
40 INJECTION SUBCUTANEOUS EVERY 24 HOURS
Status: DISCONTINUED | OUTPATIENT
Start: 2024-06-14 | End: 2024-06-17 | Stop reason: HOSPADM

## 2024-06-13 RX ORDER — AMOXICILLIN 250 MG
1 CAPSULE ORAL 2 TIMES DAILY
Status: DISCONTINUED | OUTPATIENT
Start: 2024-06-13 | End: 2024-06-17 | Stop reason: HOSPADM

## 2024-06-13 RX ORDER — MORPHINE SULFATE 4 MG/ML
3-4 INJECTION, SOLUTION INTRAMUSCULAR; INTRAVENOUS
Status: DISCONTINUED | OUTPATIENT
Start: 2024-06-13 | End: 2024-06-14

## 2024-06-13 RX ORDER — FUROSEMIDE 10 MG/ML
INJECTION INTRAMUSCULAR; INTRAVENOUS AS NEEDED
Status: DISCONTINUED | OUTPATIENT
Start: 2024-06-13 | End: 2024-06-13 | Stop reason: SURG

## 2024-06-13 RX ORDER — MAGNESIUM SULFATE HEPTAHYDRATE 40 MG/ML
INJECTION, SOLUTION INTRAVENOUS AS NEEDED
Status: DISCONTINUED | OUTPATIENT
Start: 2024-06-13 | End: 2024-06-13 | Stop reason: SURG

## 2024-06-13 RX ORDER — ONDANSETRON HYDROCHLORIDE 2 MG/ML
4 INJECTION, SOLUTION INTRAVENOUS EVERY 6 HOURS PRN
Status: DISCONTINUED | OUTPATIENT
Start: 2024-06-13 | End: 2024-06-17 | Stop reason: HOSPADM

## 2024-06-13 RX ORDER — SODIUM CHLORIDE 0.9 % (FLUSH) 0.9 %
10 SYRINGE (ML) INJECTION 2 TIMES DAILY
Status: DISCONTINUED | OUTPATIENT
Start: 2024-06-13 | End: 2024-06-13 | Stop reason: HOSPADM

## 2024-06-13 RX ORDER — LEVALBUTEROL INHALATION SOLUTION 1.25 MG/3ML
1.25 SOLUTION RESPIRATORY (INHALATION)
Status: DISCONTINUED | OUTPATIENT
Start: 2024-06-13 | End: 2024-06-16

## 2024-06-13 RX ORDER — METOCLOPRAMIDE HYDROCHLORIDE 5 MG/ML
5 INJECTION INTRAMUSCULAR; INTRAVENOUS EVERY 6 HOURS PRN
Status: DISCONTINUED | OUTPATIENT
Start: 2024-06-13 | End: 2024-06-17 | Stop reason: HOSPADM

## 2024-06-13 RX ORDER — METOCLOPRAMIDE HYDROCHLORIDE 5 MG/ML
INJECTION INTRAMUSCULAR; INTRAVENOUS
Status: COMPLETED
Start: 2024-06-13 | End: 2024-06-13

## 2024-06-13 RX ORDER — SODIUM CHLORIDE 9 MG/ML
INJECTION INTRAMUSCULAR; INTRAVENOUS; SUBCUTANEOUS AS NEEDED
Status: DISCONTINUED | OUTPATIENT
Start: 2024-06-13 | End: 2024-06-13 | Stop reason: HOSPADM

## 2024-06-13 RX ORDER — NOREPINEPHRINE BITARTRATE 0.03 MG/ML
.5-3 INJECTION, SOLUTION INTRAVENOUS
Status: DISCONTINUED | OUTPATIENT
Start: 2024-06-13 | End: 2024-06-16

## 2024-06-13 RX ORDER — PROPOFOL 10 MG/ML
INJECTION, EMULSION INTRAVENOUS AS NEEDED
Status: DISCONTINUED | OUTPATIENT
Start: 2024-06-13 | End: 2024-06-13 | Stop reason: SURG

## 2024-06-13 RX ORDER — LABETALOL HYDROCHLORIDE 5 MG/ML
INJECTION, SOLUTION INTRAVENOUS AS NEEDED
Status: DISCONTINUED | OUTPATIENT
Start: 2024-06-13 | End: 2024-06-13 | Stop reason: SURG

## 2024-06-13 RX ORDER — OXYCODONE HYDROCHLORIDE 5 MG/1
5-10 TABLET ORAL EVERY 4 HOURS PRN
Status: DISCONTINUED | OUTPATIENT
Start: 2024-06-13 | End: 2024-06-17 | Stop reason: HOSPADM

## 2024-06-13 RX ORDER — DOBUTAMINE HYDROCHLORIDE 200 MG/100ML
INJECTION INTRAVENOUS CONTINUOUS PRN
Status: DISCONTINUED | OUTPATIENT
Start: 2024-06-13 | End: 2024-06-13 | Stop reason: SURG

## 2024-06-13 RX ORDER — SODIUM CHLORIDE 0.9 % (FLUSH) 0.9 %
2 SYRINGE (ML) INJECTION AS NEEDED
Status: DISCONTINUED | OUTPATIENT
Start: 2024-06-13 | End: 2024-06-13 | Stop reason: HOSPADM

## 2024-06-13 RX ORDER — DEXMEDETOMIDINE HYDROCHLORIDE 4 UG/ML
.2-1.5 INJECTION, SOLUTION INTRAVENOUS
Status: DISCONTINUED | OUTPATIENT
Start: 2024-06-13 | End: 2024-06-13 | Stop reason: SDUPTHER

## 2024-06-13 RX ORDER — MANNITOL 20 G/100ML
INJECTION, SOLUTION INTRAVENOUS CONTINUOUS PRN
Status: DISCONTINUED | OUTPATIENT
Start: 2024-06-13 | End: 2024-06-13 | Stop reason: SURG

## 2024-06-13 RX ORDER — IPRATROPIUM BROMIDE 0.5 MG/2.5ML
0.5 SOLUTION RESPIRATORY (INHALATION)
Status: DISCONTINUED | OUTPATIENT
Start: 2024-06-13 | End: 2024-06-16

## 2024-06-13 RX ORDER — DEXTROSE 50 % IN WATER (D50W) INTRAVENOUS SYRINGE
15-30
Status: DISCONTINUED | OUTPATIENT
Start: 2024-06-13 | End: 2024-06-17 | Stop reason: HOSPADM

## 2024-06-13 RX ORDER — AMIODARONE HYDROCHLORIDE 50 MG/ML
INJECTION, SOLUTION INTRAVENOUS AS NEEDED
Status: DISCONTINUED | OUTPATIENT
Start: 2024-06-13 | End: 2024-06-13 | Stop reason: SURG

## 2024-06-13 RX ORDER — ALBUMIN HUMAN 50 G/1000ML
SOLUTION INTRAVENOUS CONTINUOUS PRN
Status: DISCONTINUED | OUTPATIENT
Start: 2024-06-13 | End: 2024-06-13 | Stop reason: SURG

## 2024-06-13 RX ORDER — POLYVINYL ALCOHOL 1.4 %
1-2 DROPS OPHTHALMIC (EYE) AS NEEDED
Status: DISCONTINUED | OUTPATIENT
Start: 2024-06-13 | End: 2024-06-17 | Stop reason: HOSPADM

## 2024-06-13 RX ORDER — LIDOCAINE HCL/PF 100 MG/5ML
SYRINGE (ML) INTRAVENOUS AS NEEDED
Status: DISCONTINUED | OUTPATIENT
Start: 2024-06-13 | End: 2024-06-13 | Stop reason: SURG

## 2024-06-13 RX ORDER — ONDANSETRON HYDROCHLORIDE 2 MG/ML
4 INJECTION, SOLUTION INTRAVENOUS ONCE
Status: COMPLETED | OUTPATIENT
Start: 2024-06-13 | End: 2024-06-13

## 2024-06-13 RX ORDER — ACETAMINOPHEN 10 MG/ML
INJECTION, SOLUTION INTRAVENOUS AS NEEDED
Status: DISCONTINUED | OUTPATIENT
Start: 2024-06-13 | End: 2024-06-13 | Stop reason: SURG

## 2024-06-13 RX ORDER — ACETAMINOPHEN 500 MG
1000 TABLET ORAL EVERY 6 HOURS SCHEDULED
Status: DISCONTINUED | OUTPATIENT
Start: 2024-06-13 | End: 2024-06-17 | Stop reason: HOSPADM

## 2024-06-13 RX ORDER — KETAMINE HCL IN 0.9 % NACL 50 MG/5 ML
SYRINGE (ML) INTRAVENOUS AS NEEDED
Status: DISCONTINUED | OUTPATIENT
Start: 2024-06-13 | End: 2024-06-13 | Stop reason: SURG

## 2024-06-13 RX ORDER — ALUMINUM HYDROXIDE, MAGNESIUM HYDROXIDE, AND SIMETHICONE 1200; 120; 1200 MG/30ML; MG/30ML; MG/30ML
30 SUSPENSION ORAL 3 TIMES DAILY PRN
Status: DISCONTINUED | OUTPATIENT
Start: 2024-06-13 | End: 2024-06-17 | Stop reason: HOSPADM

## 2024-06-13 RX ORDER — INDOMETHACIN 25 MG/1
CAPSULE ORAL AS NEEDED
Status: DISCONTINUED | OUTPATIENT
Start: 2024-06-13 | End: 2024-06-13 | Stop reason: SURG

## 2024-06-13 RX ORDER — MAGNESIUM SULFATE HEPTAHYDRATE 40 MG/ML
4 INJECTION, SOLUTION INTRAVENOUS AS NEEDED
Status: DISCONTINUED | OUTPATIENT
Start: 2024-06-13 | End: 2024-06-17 | Stop reason: HOSPADM

## 2024-06-13 RX ORDER — NAPROXEN SODIUM 220 MG/1
81 TABLET, FILM COATED ORAL DAILY
Status: DISCONTINUED | OUTPATIENT
Start: 2024-06-13 | End: 2024-06-17 | Stop reason: HOSPADM

## 2024-06-13 RX ADMIN — HEPARIN SODIUM 3000 UNITS: 1000 INJECTION, SOLUTION INTRAVENOUS; SUBCUTANEOUS at 08:17

## 2024-06-13 RX ADMIN — MAGNESIUM SULFATE HEPTAHYDRATE 350 ML: 500 INJECTION, SOLUTION INTRAMUSCULAR; INTRAVENOUS at 11:41

## 2024-06-13 RX ADMIN — DOBUTAMINE HYDROCHLORIDE 3 MCG/KG/MIN: 200 INJECTION INTRAVENOUS at 16:27

## 2024-06-13 RX ADMIN — CALCIUM CHLORIDE 0.25 G: 100 INJECTION, SOLUTION INTRAVENOUS at 14:23

## 2024-06-13 RX ADMIN — ALBUMIN HUMAN: 0.05 INJECTION, SOLUTION INTRAVENOUS at 10:08

## 2024-06-13 RX ADMIN — POLYETHYLENE GLYCOL 3350 17 G: 17 POWDER, FOR SOLUTION ORAL at 16:51

## 2024-06-13 RX ADMIN — PROPOFOL 50 MG: 10 INJECTION, EMULSION INTRAVENOUS at 08:54

## 2024-06-13 RX ADMIN — IPRATROPIUM BROMIDE 0.5 MG: 0.5 SOLUTION RESPIRATORY (INHALATION) at 19:22

## 2024-06-13 RX ADMIN — MIDAZOLAM 1 MG: 1 INJECTION INTRAMUSCULAR; INTRAVENOUS at 11:33

## 2024-06-13 RX ADMIN — VECURONIUM BROMIDE 5 MG: 10 INJECTION, POWDER, LYOPHILIZED, FOR SOLUTION INTRAVENOUS at 08:57

## 2024-06-13 RX ADMIN — MANNITOL: 20 INJECTION, SOLUTION INTRAVENOUS at 13:10

## 2024-06-13 RX ADMIN — VECURONIUM BROMIDE 5 MG: 10 INJECTION, POWDER, LYOPHILIZED, FOR SOLUTION INTRAVENOUS at 08:05

## 2024-06-13 RX ADMIN — HEPARIN SODIUM 22000 UNITS: 1000 INJECTION, SOLUTION INTRAVENOUS; SUBCUTANEOUS at 08:47

## 2024-06-13 RX ADMIN — DEXAMETHASONE SODIUM PHOSPHATE 4 MG: 4 INJECTION, SOLUTION INTRAMUSCULAR; INTRAVENOUS at 07:22

## 2024-06-13 RX ADMIN — HYDROCORTISONE SODIUM SUCCINATE 100 MG: 100 INJECTION, POWDER, FOR SOLUTION INTRAMUSCULAR; INTRAVENOUS at 11:12

## 2024-06-13 RX ADMIN — SODIUM BICARBONATE 50 MEQ: 84 INJECTION, SOLUTION INTRAVENOUS at 12:27

## 2024-06-13 RX ADMIN — PROPOFOL 50 MG: 10 INJECTION, EMULSION INTRAVENOUS at 07:09

## 2024-06-13 RX ADMIN — MORPHINE SULFATE 2 MG: 2 INJECTION, SOLUTION INTRAMUSCULAR; INTRAVENOUS at 23:25

## 2024-06-13 RX ADMIN — VECURONIUM BROMIDE 10 MG: 10 INJECTION, POWDER, LYOPHILIZED, FOR SOLUTION INTRAVENOUS at 07:10

## 2024-06-13 RX ADMIN — CALCIUM CHLORIDE 0.25 G: 100 INJECTION, SOLUTION INTRAVENOUS at 14:42

## 2024-06-13 RX ADMIN — ETHYL ALCOHOL 1 APPLICATION: 62 SWAB TOPICAL at 21:00

## 2024-06-13 RX ADMIN — NOREPINEPHRINE BITARTRATE 8 MCG: 1 INJECTION, SOLUTION, CONCENTRATE INTRAVENOUS at 07:44

## 2024-06-13 RX ADMIN — ACETAMINOPHEN 1000 MG: 10 INJECTION, SOLUTION INTRAVENOUS at 07:55

## 2024-06-13 RX ADMIN — PHENYLEPHRINE HYDROCHLORIDE 100 MCG: 10 INJECTION INTRAVENOUS at 09:29

## 2024-06-13 RX ADMIN — NOREPINEPHRINE BITARTRATE 4 MCG: 1 INJECTION, SOLUTION, CONCENTRATE INTRAVENOUS at 12:16

## 2024-06-13 RX ADMIN — AMIODARONE HYDROCHLORIDE 150 MG: 50 INJECTION, SOLUTION INTRAVENOUS at 08:24

## 2024-06-13 RX ADMIN — CALCIUM CHLORIDE 0.25 G: 100 INJECTION, SOLUTION INTRAVENOUS at 15:03

## 2024-06-13 RX ADMIN — FENTANYL CITRATE 100 MCG: 50 INJECTION, SOLUTION INTRAMUSCULAR; INTRAVENOUS at 07:09

## 2024-06-13 RX ADMIN — VANCOMYCIN HYDROCHLORIDE 150 MG: 1.5 INJECTION, POWDER, LYOPHILIZED, FOR SOLUTION INTRAVENOUS at 06:47

## 2024-06-13 RX ADMIN — CEFAZOLIN 2000 MG: 2 INJECTION, POWDER, FOR SOLUTION INTRAMUSCULAR; INTRAVENOUS at 07:51

## 2024-06-13 RX ADMIN — FENTANYL CITRATE 150 MCG: 50 INJECTION, SOLUTION INTRAMUSCULAR; INTRAVENOUS at 07:36

## 2024-06-13 RX ADMIN — DEXMEDETOMIDINE HYDROCHLORIDE 0.5 MCG/KG/HR: 4 INJECTION, SOLUTION INTRAVENOUS at 16:25

## 2024-06-13 RX ADMIN — DOBUTAMINE HYDROCHLORIDE 2.5 MCG/KG/MIN: 200 INJECTION INTRAVENOUS at 14:12

## 2024-06-13 RX ADMIN — MAGNESIUM SULFATE HEPTAHYDRATE 200 ML: 500 INJECTION, SOLUTION INTRAMUSCULAR; INTRAVENOUS at 10:33

## 2024-06-13 RX ADMIN — SODIUM CHLORIDE 400 ML: 9 INJECTION, SOLUTION INTRAVENOUS at 18:21

## 2024-06-13 RX ADMIN — LEVALBUTEROL HYDROCHLORIDE 1.25 MG: 1.25 SOLUTION RESPIRATORY (INHALATION) at 19:22

## 2024-06-13 RX ADMIN — POTASSIUM CHLORIDE 20 MEQ: 29.8 INJECTION, SOLUTION INTRAVENOUS at 20:55

## 2024-06-13 RX ADMIN — HEPARIN SODIUM 5000 UNITS: 1000 INJECTION, SOLUTION INTRAVENOUS; SUBCUTANEOUS at 13:20

## 2024-06-13 RX ADMIN — DEXMEDETOMIDINE HYDROCHLORIDE 0.5 MCG/KG/HR: 4 INJECTION, SOLUTION INTRAVENOUS at 07:36

## 2024-06-13 RX ADMIN — SODIUM CHLORIDE, POTASSIUM CHLORIDE, SODIUM LACTATE AND CALCIUM CHLORIDE: 600; 310; 30; 20 INJECTION, SOLUTION INTRAVENOUS at 07:01

## 2024-06-13 RX ADMIN — SUGAMMADEX 200 MG: 100 INJECTION, SOLUTION INTRAVENOUS at 15:22

## 2024-06-13 RX ADMIN — CEFAZOLIN 2000 MG: 2 INJECTION, POWDER, FOR SOLUTION INTRAMUSCULAR; INTRAVENOUS at 11:51

## 2024-06-13 RX ADMIN — SODIUM BICARBONATE 50 MEQ: 84 INJECTION, SOLUTION INTRAVENOUS at 13:23

## 2024-06-13 RX ADMIN — ALBUMIN (HUMAN) 12.5 G: 12.5 INJECTION, SOLUTION INTRAVENOUS at 23:57

## 2024-06-13 RX ADMIN — CALCIUM CHLORIDE 600 ML/HR: 100 INJECTION INTRAVENOUS; INTRAVENTRICULAR at 13:49

## 2024-06-13 RX ADMIN — LABETALOL HYDROCHLORIDE 2.5 MG: 5 INJECTION, SOLUTION INTRAVENOUS at 07:33

## 2024-06-13 RX ADMIN — METOCLOPRAMIDE HYDROCHLORIDE 5 MG: 5 INJECTION INTRAMUSCULAR; INTRAVENOUS at 22:11

## 2024-06-13 RX ADMIN — AMINOCAPROIC ACID 5 G: 250 INJECTION, SOLUTION INTRAVENOUS at 08:24

## 2024-06-13 RX ADMIN — INSULIN HUMAN 1.5 UNITS/HR: 1 INJECTION, SOLUTION INTRAVENOUS at 16:31

## 2024-06-13 RX ADMIN — FENTANYL CITRATE 100 MCG: 50 INJECTION, SOLUTION INTRAMUSCULAR; INTRAVENOUS at 08:22

## 2024-06-13 RX ADMIN — SODIUM BICARBONATE 50 MEQ: 84 INJECTION, SOLUTION INTRAVENOUS at 11:09

## 2024-06-13 RX ADMIN — NAPROXEN SODIUM 81 MG: 220 TABLET, FILM COATED ORAL at 16:51

## 2024-06-13 RX ADMIN — CLEVIPIDINE 2 MG/HR: 0.5 EMULSION INTRAVENOUS at 13:28

## 2024-06-13 RX ADMIN — PHENYLEPHRINE HYDROCHLORIDE 100 MCG: 10 INJECTION INTRAVENOUS at 13:42

## 2024-06-13 RX ADMIN — ALBUMIN HUMAN 1250 ML: 0.25 SOLUTION INTRAVENOUS at 08:20

## 2024-06-13 RX ADMIN — FENTANYL CITRATE 100 MCG: 50 INJECTION, SOLUTION INTRAMUSCULAR; INTRAVENOUS at 08:10

## 2024-06-13 RX ADMIN — LIDOCAINE HYDROCHLORIDE 100 MG: 20 INJECTION INTRAVENOUS at 13:10

## 2024-06-13 RX ADMIN — SODIUM CHLORIDE: 9 INJECTION, SOLUTION INTRAVENOUS at 07:25

## 2024-06-13 RX ADMIN — ONDANSETRON 4 MG: 2 INJECTION INTRAMUSCULAR; INTRAVENOUS at 07:22

## 2024-06-13 RX ADMIN — Medication 20 MG: at 07:09

## 2024-06-13 RX ADMIN — MAGNESIUM SULFATE HEPTAHYDRATE 450 ML: 500 INJECTION, SOLUTION INTRAMUSCULAR; INTRAVENOUS at 09:27

## 2024-06-13 RX ADMIN — SODIUM CHLORIDE: 9 INJECTION, SOLUTION INTRAVENOUS at 10:01

## 2024-06-13 RX ADMIN — Medication 30 MG: at 08:10

## 2024-06-13 RX ADMIN — MIDAZOLAM 1 MG: 1 INJECTION INTRAMUSCULAR; INTRAVENOUS at 07:01

## 2024-06-13 RX ADMIN — CLEVIPIDINE 100 MCG: 0.5 EMULSION INTRAVENOUS at 08:54

## 2024-06-13 RX ADMIN — PROPOFOL 25 MG: 10 INJECTION, EMULSION INTRAVENOUS at 07:15

## 2024-06-13 RX ADMIN — FENTANYL CITRATE 50 MCG: 50 INJECTION, SOLUTION INTRAMUSCULAR; INTRAVENOUS at 08:04

## 2024-06-13 RX ADMIN — ONDANSETRON 4 MG: 2 INJECTION INTRAMUSCULAR; INTRAVENOUS at 20:19

## 2024-06-13 RX ADMIN — FUROSEMIDE 20 MG: 10 INJECTION, SOLUTION INTRAMUSCULAR; INTRAVENOUS at 14:14

## 2024-06-13 RX ADMIN — PHENYLEPHRINE HYDROCHLORIDE 100 MCG: 10 INJECTION INTRAVENOUS at 09:26

## 2024-06-13 RX ADMIN — Medication 2 MCG/MIN: at 07:42

## 2024-06-13 RX ADMIN — MAGNESIUM SULFATE HEPTAHYDRATE 4 G: 40 INJECTION, SOLUTION INTRAVENOUS at 13:10

## 2024-06-13 RX ADMIN — CALCIUM CHLORIDE 1 G: 100 INJECTION INTRAVENOUS; INTRAVENTRICULAR at 13:09

## 2024-06-13 RX ADMIN — CEFAZOLIN 2000 MG: 2 INJECTION, POWDER, FOR SOLUTION INTRAMUSCULAR; INTRAVENOUS at 20:26

## 2024-06-13 RX ADMIN — CALCIUM CHLORIDE 0.25 G: 100 INJECTION, SOLUTION INTRAVENOUS at 14:51

## 2024-06-13 RX ADMIN — PROPOFOL 50 MG: 10 INJECTION, EMULSION INTRAVENOUS at 08:47

## 2024-06-13 RX ADMIN — VASOPRESSIN 0.03 UNITS/MIN: 20 INJECTION, SOLUTION INTRAVENOUS at 16:23

## 2024-06-13 RX ADMIN — SODIUM CHLORIDE 400 ML: 9 INJECTION, SOLUTION INTRAVENOUS at 16:30

## 2024-06-13 RX ADMIN — VECURONIUM BROMIDE 10 MG: 10 INJECTION, POWDER, LYOPHILIZED, FOR SOLUTION INTRAVENOUS at 10:38

## 2024-06-13 RX ADMIN — ACETAMINOPHEN 650 MG: 650 LIQUID ORAL at 16:56

## 2024-06-13 RX ADMIN — POTASSIUM CHLORIDE 20 MEQ: 29.8 INJECTION, SOLUTION INTRAVENOUS at 19:06

## 2024-06-13 RX ADMIN — MAGNESIUM SULFATE HEPTAHYDRATE 300 ML: 500 INJECTION, SOLUTION INTRAMUSCULAR; INTRAVENOUS at 12:33

## 2024-06-13 RX ADMIN — LANSOPRAZOLE 15 MG: 15 CAPSULE, DELAYED RELEASE ORAL at 16:51

## 2024-06-13 RX ADMIN — CLEVIPIDINE 6 MG/HR: 0.5 EMULSION INTRAVENOUS at 16:23

## 2024-06-13 RX ADMIN — INSULIN HUMAN 2 UNITS/HR: 1 INJECTION, SOLUTION INTRAVENOUS at 09:25

## 2024-06-13 RX ADMIN — PROPOFOL 30 MG: 10 INJECTION, EMULSION INTRAVENOUS at 08:10

## 2024-06-13 RX ADMIN — NOREPINEPHRINE BITARTRATE 2 MCG/MIN: 0.03 INJECTION, SOLUTION INTRAVENOUS at 16:22

## 2024-06-13 RX ADMIN — NOREPINEPHRINE BITARTRATE 8 MCG: 1 INJECTION, SOLUTION, CONCENTRATE INTRAVENOUS at 08:32

## 2024-06-13 RX ADMIN — FUROSEMIDE 20 MG: 10 INJECTION, SOLUTION INTRAMUSCULAR; INTRAVENOUS at 14:36

## 2024-06-13 RX ADMIN — NOREPINEPHRINE BITARTRATE 8 MCG: 1 INJECTION, SOLUTION, CONCENTRATE INTRAVENOUS at 07:39

## 2024-06-13 RX ADMIN — CLEVIPIDINE 5 MG/HR: 0.5 EMULSION INTRAVENOUS at 17:38

## 2024-06-13 RX ADMIN — ALBUMIN HUMAN: 0.05 INJECTION, SOLUTION INTRAVENOUS at 10:16

## 2024-06-13 RX ADMIN — POTASSIUM CHLORIDE 20 MEQ: 29.8 INJECTION, SOLUTION INTRAVENOUS at 17:36

## 2024-06-13 RX ADMIN — MAGNESIUM SULFATE HEPTAHYDRATE 450 ML: 500 INJECTION, SOLUTION INTRAMUSCULAR; INTRAVENOUS at 09:28

## 2024-06-13 ASSESSMENT — ENCOUNTER SYMPTOMS: SHORTNESS OF BREATH: 1

## 2024-06-13 NOTE — ANESTHESIA PROCEDURE NOTES
Arterial Line:      An arterial line was placed.   Procedure performed using surface landmarks in the patient's room for the following indication(s): continuous blood pressure monitoring and blood sampling needed.  Sterile technique: gloves, mask, partial drape, hand hygiene prior to doning sterile gloves and cap  Prep: ChloraPrep  Local infiltration: lidocaine 1%    A 20 gauge (size), 1 and 3/4 inch (length), Arrow (type) catheter was placed, into the Left radial artery, secured by tape, Tegaderm and Mastisol.  Seldinger technique used    Events:  patient tolerated procedure well with no complications.    Staffing  CRNA: Henri Cobb CRNA  Performed: CRNA

## 2024-06-13 NOTE — BRIEF OP NOTE
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]     * Coronary artery disease involving native coronary artery of native heart with angina pectoris (CMS/HCC) [I25.119]    Procedures:    * CORONARY ARTERY BYPASS GRAFT X 2 UTILIZING LEFT INTERNAL MAMMARY, RIGHT SAPHENOUS VEIN VIA ENDOSCOPIC VEIN HARVEST, ATRIAL MYXOMA RESECTION WITH BOVINE ATRIAL PATCH SEPTOPLASTY, PRIMARY RECONSTRUCTION OF LEFT ATRIAL DOME, ASCENDING AORTIC ANEURYSM REPLACEMENT USING [id number] HEMASHIELD GRAFT, CARDIOPULMONARY BYPASS WITH DEEP HYPOTHERMIC CIRCULATORY ARREST, TRANSESOPHAGEAL ECHOCARDIOGRAM, STERNAL PLATING, PLACEMENT OF PREVENA INCISION MANAGEMENT SYSTEM    Surgeon(s):  Cash Huynh MD    Anesthesia:  Anesthesiologist: Eliceo Pantoja MD  CRNA: Henri Cobb CRNA; Anupama Berg CRNA  Perfusionist: Yury Slater CCP; Jadiel Loza CCP  General      Pain Block:  For acute post-operative pain management    Staff:   Circulator: Erika Prescott RN  Relief Circulator: Clarisse Bates RN  Relief Scrub: Citlali Lowe RN  Scrub Person: Angie Macario; Shira Garcia  First Assist: Yarelis Cobb, ALY; Darian Corey, RN  Non-employed DUTCH: Neeta Trejo    Estimated Blood Loss:  No blood loss documented.    Specimens:  Order Name Source Comment Collection Info Order Time   THROMBOELASTROGRAPH Blood, Venous   6/13/2024  5:16 AM     Which TEG test is needed?   TEG- Global Hemostasis/Adult Cardiac and Cardiology Procedures (K, KH, RT, FF)          Which TEG test is needed?   TEG- PlateletMapping® ADP & AA/TBD (Platelet Inhibition)        BLOOD BANK COMMUNICATION No Specimen Collected   6/13/2024  6:27 AM     Message reason (select all that apply)   Product Standby Request (Inventory)          Inventory Comment   6 units  "FFP, platelets, cryo        THROMBOELASTROGRAPH Blood, Venous   6/13/2024  2:45 PM     Which TEG test is needed?   TEG- Global Hemostasis/Adult Cardiac and Cardiology Procedures (K, KH, RT, FF)        CBC WITH AUTO DIFFERENTIAL Blood, Venous   6/13/2024  3:19 PM   BASIC METABOLIC PANEL Blood, Venous   6/13/2024  3:19 PM   PROTIME-INR Blood, Venous   6/13/2024  3:19 PM   PTT (ACTIVATED PARTIAL THROMBOPLASTIN TIME) Blood, Venous   6/13/2024  3:19 PM   FIBRINOGEN Blood, Venous   6/13/2024  3:19 PM   PREPARE RBC  If Uncrossmatched/Emergent blood is set to yes then I understand the term \"uncrossmatched\" to mean that testing for donor/recipient compatibility is incomplete but in progress.    I believe in spite of the above, the patient's condition warrants transfusion.  6/13/2024  6:26 AM     Uncrossmatched / Emergent   No          Transfusion indications   Potential significant blood loss (i.e. surgery)          Transfusion duration per unit (hrs):   2 Hours        PREPARE RBC  If Uncrossmatched/Emergent blood is set to yes then I understand the term \"uncrossmatched\" to mean that testing for donor/recipient compatibility is incomplete but in progress.    I believe in spite of the above, the patient's condition warrants transfusion.  6/13/2024 11:07 AM     Uncrossmatched / Emergent   No          Transfusion indications   Potential significant blood loss (i.e. surgery)          Transfusion duration per unit (hrs):   2 Hours        PREPARE PLATELET PHERESIS    6/13/2024 12:40 PM     Emergent   No          Transfusion indications   Other (Specify):          Other indication   surgery          Transfusion duration per unit (hrs):   1 Hour        PREPARE FRESH FROZEN PLASMA Blood, Venous   6/13/2024 12:40 PM     Emergent   No          Transfusion indications   Other (Specify):          Other indication   surgery          Transfusion duration per unit (hrs):   1 Hour        PREPARE CRYOPRECIPITATE (POOLED) Blood, Venous   " 6/13/2024 12:40 PM     Emergent   No          Transfusion indications   Other (Specify):          Other indication   surgery        PREPARE CRYOPRECIPITATE (POOLED) Blood, Venous   6/13/2024  2:45 PM     Emergent   No          Transfusion indications   Fibrinogen deficiency (less than 100 mg/dL) and bleeding        PATHOLOGY SPECIMEN (HISTOLOGY) Heart  Collected By: Cash Huynh MD 6/13/2024  1:17 PM   PATHOLOGY SPECIMEN (HISTOLOGY) Heart  Collected By: Cash Huynh MD 6/13/2024  1:17 PM         Drains:  Chest Tube 3 Left Pleural (Active)       Gastrointestinal Tube Orogastric 18 Fr Center mouth (Active)       Urethral Catheter Latex;Straight-tip;Temperature probe 16 Fr. (Active)       Y Chest Tube 1 and 2 1 Anterior Mediastinal 24 Fr. 2 Posterior Mediastinal 24 Fr. (Active)       Complications:  None    Disposition:  ICU    Cash Huynh MD  Phone Number: [phone]    Date: 6/13/2024  Time: 3:41 PM
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
## 2024-06-13 NOTE — INTERDISCIPLINARY/THERAPY
06/13/24 1618   Cardiac Rehab Phase I   Comment CR order received and acknowledged at this time. CR will continue to follow and eval once appropriate.

## 2024-06-13 NOTE — ANESTHESIA PROCEDURE NOTES
Arterial Line:    Date/Time: 6/13/2024 7:15 AM    An arterial line was placed.   Procedure performed using ultrasound guidance and surface landmarks in the OR for the following indication(s): continuous blood pressure monitoring and blood sampling needed.  Sterile technique: gloves, hand hygiene prior to doning sterile gloves, cap, mask and partial drape  Prep: ChloraPrep      A 20 gauge (size), 1 and 3/4 inch (length), Arrow (type) catheter was placed, into the Right radial artery, secured by tape, Tegaderm, dressing applied and Mastisol.  Seldinger technique used    Events:  patient tolerated procedure well with no complications.    Staffing  Anesthesiologist: Eliceo Pantoja MD  Performed: anesthesiologist

## 2024-06-13 NOTE — CONSULTATION
Critical care consult note:    Patient is a 64-year-old female status post pump assisted CABG x 2, resection of complex left atrial myxoma with primary reconstruction of left atrial dome, bovine patch intra atrial septoplasty with ascending aortic replacement with deep hypothermic circulatory arrest and retrograde cerebral perfusion.  Patient with a history of severe two-vessel coronary disease, bicuspid aortic valve with mild stenosis/trace regurgitation with an ascending aortic aneurysm, primary hypertension, hyperlipidemia, chronic bronchitis, nonalcoholic fatty liver disease, status post hysterectomy status post umbilical hernia repair status post tonsillectomy status post cholecystectomy.      Past medical, surgical, social, family and allergies reviewed.    Physical exam-  General: Intubated, sedated  CV: Regular rate and rhythm  Respiratory: No ventilator dyssynchrony on moderate requirements  Abdomen: Obese, no apparent tenderness  Extremities: No significant edema, warm  Neuro: No response to requests, pupils equal round and reactive to light    Assessment/plan-    Patient is a 64-year-old female status post pump assisted CABG x 2, resection of complex left atrial myxoma with primary reconstruction of left atrial dome, bovine patch intra atrial septoplasty with ascending aortic replacement with deep hypothermic circulatory arrest and retrograde cerebral perfusion.    Status post CABG x 2, left atrial myxoma resection, ascending aortic reconstruction  Postoperative respiratory insufficiency, expected  Acute blood loss anemia, expected  Hyponatremia  Hypokalemia  CAD  Bicuspid aortic valve with mild stenosis/trace regurgitation  Primary hypertension  Hyperlipidemia  Chronic bronchitis  Nonalcoholic fatty liver disease    Patient is on dobutamine, clevidipine, insulin and Precedex infusions postoperatively.Currently normal sinus rhythm without currently normal sinus rhythm without any pacer requirement.  She  was requiring 100% FiO2 but has been able to wean down to be 85% with a PEEP of 10.  If continues to be able to wean down, she could be extubated per protocol.  Bronchial hygiene orders will be placed.  Recent UTI for which patient is on Bactrim.  Recent urine culture showing E. coli for which patient has been on Bactrim with planned 5-day course to be completed on 6/17.  Postoperative labs showing mild hyponatremia and hypokalemia with a mildly elevated anion gap.  Creatinine is not different from preoperatively.  CBC with normal WBC and expected acute blood loss anemia with normal platelets.      [facility]  Critical care medicine

## 2024-06-13 NOTE — ANESTHESIA PROCEDURE NOTES
Airway  Date/Time: 6/13/2024 7:12 AM  Urgency: elective    Airway not difficult    General Information and Staff    Patient location during procedure: OR  CRNA: Henri Cobb CRNA  Performed: CRNA     Indications and Patient Condition  Indications for airway management: anesthesia and airway protection  Spontaneous Ventilation: absent  Sedation level: deep  Preoxygenated: yes  Patient position: sniffing  MILS maintained throughout  Mask difficulty assessment: 1 - vent by mask    Final Airway Details  Final airway type: endotracheal airway      Successful airway: ETT  Cuffed: yes   Successful intubation technique: direct laryngoscopy  Facilitating devices/methods: stylet  Endotracheal tube insertion site: oral  Blade: Filipe  Blade size: #3  ETT size (mm): 8.0  Cormack-Lehane Classification: grade IIb - view of arytenoids or posterior of glottis only  Placement verified by: chest auscultation, capnometry and palpation of cuff   Cuff volume (mL): 5  Measured from: lips  ETT to lips (cm): 21  Number of attempts at approach: 1

## 2024-06-13 NOTE — ANESTHESIA PREPROCEDURE EVALUATION
"Pre-Procedure Assessment    Patient: Jamir Acevedo, female, 64 y.o.    Ht Readings from Last 1 Encounters:   06/13/24 1.676 m (5' 6\")     Wt Readings from Last 1 Encounters:   06/13/24 99.2 kg (218 lb 11.2 oz)       Last Vitals  /91 (06/13/24 0531)    Temp 36.2 °C (97.1 °F) (06/13/24 0531)    Pulse 63 (06/13/24 0531)   Resp 16 (06/13/24 0531)    SpO2 96 % (06/13/24 0531)    Pain Score 0-No pain (06/13/24 0531)       Problem list reviewed and Medical history reviewed    History of anesthetic complications: PONV         Airway   Mallampati: II  TM distance: >3 FB  Neck ROM: full      Dental      Pulmonary     breath sounds clear to auscultation  (+) pneumonia, shortness of breath, chronic bronchitis  Cardiovascular   (+) hypertension, valvular problems/murmurs, CAD    Rhythm: regular  Rate: normal  ROS comment: [facility] 4/2024    Final Impression:  ·Distal left main has 70% stenosis which involves ostial left into descending artery.  ·Ostial left anterior descending artery has 80% stenosis.  ·Left circumflex artery is patent.  ·Proximal right coronary artery has 30% stenosis    KENDRICK 3/2024    · Transesophageal echocardiograms performed to assess left atrial mass.  · Left ventricle is normal in size.  Normal systolic function with visual ejection fraction between 55 to 60%.  Normal wall motion.  · Right ventricle is normal in size with normal systolic function.  · Biatria normal in size.  · There is presence of 1.4 X 1.86 cm oval circumscribed mass with circumference area of 1.83 cm2 attached to intra-atrial septum near fossa ovalis.  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  · Aortic valve is bicuspid.  Mild aortic stenosis based on transthoracic echocardiogram.  Trace aortic regurgitation.  · Ascending aorta mildly dilated with max temperature of 4.06 cm.  · Trace mitral and tricuspid regurgitation.  · No evidence of pericardial effusion.        Mental Status/Neuro/Psych    Pt is alert.      (+) neck injury, peripheral " neuropathy    GI/Hepatic/Renal    (+) hiatal hernia, PUD, liver disease    Endo/Other - negative ROS   Abdominal             Social History     Tobacco Use    Smoking status: Never    Smokeless tobacco: Never   Substance Use Topics    Alcohol use: Not Currently     Comment: Maybe 1 per month even less then that now      Hematology   WBC   Date Value Ref Range Status   06/12/2024 5.5 4.5 - 10.5 10*3/uL Final     RBC   Date Value Ref Range Status   06/12/2024 4.66 3.70 - 5.30 10*6/µL Final     MCV   Date Value Ref Range Status   06/12/2024 89.8 81.0 - 97.0 fL Final     Hemoglobin   Date Value Ref Range Status   06/12/2024 14.7 11.5 - 15.5 g/dL Final     Hematocrit   Date Value Ref Range Status   06/12/2024 41.8 34.0 - 45.0 % Final     Platelets   Date Value Ref Range Status   06/12/2024 259 140 - 350 10*3/uL Final      Coagulation   Protime   Date Value Ref Range Status   06/12/2024 10.6 9.4 - 12.5 SECONDS Final     INR   Date Value Ref Range Status   06/12/2024 0.9 <=1.1 Final      General Chemistry   Calcium   Date Value Ref Range Status   06/12/2024 10.5 (H) 8.6 - 10.3 mg/dL Final     BUN   Date Value Ref Range Status   06/12/2024 21 7 - 25 mg/dL Final     Creatinine   Date Value Ref Range Status   06/12/2024 0.88 0.60 - 1.10 mg/dL Final     Glucose   Date Value Ref Range Status   06/12/2024 91 70 - 105 mg/dL Final     Sodium   Date Value Ref Range Status   06/12/2024 140 135 - 145 mmol/L Final     Potassium   Date Value Ref Range Status   06/12/2024 3.7 3.5 - 5.1 MMOL/L Final     Magnesium   Date Value Ref Range Status   06/12/2024 2.0 1.8 - 2.4 MG/DL Final     CO2   Date Value Ref Range Status   06/12/2024 27 21 - 32 mmol/L Final     Chloride   Date Value Ref Range Status   06/12/2024 104 98 - 107 mmol/L Final     Anesthesia Plan    ASA 4   NPO status reviewed: > 8 hours    General         Induction: intravenous   Airway Planning: oral ET tube  Lines/Monitors: arterial line, pulmonary artery catheter, BIS,  FloTrac, central line, KENDRICK and cerebral oximetry        Plan for postoperative opioid use.    Anesthetic plan and risks discussed with patient.  Use of blood products discussed with patient who consented to blood products.

## 2024-06-13 NOTE — ANESTHESIA PROCEDURE NOTES
Line Placement    Procedure Information: Midline Placement    Reason for insertion: intravenous access  Current IV access: no access      Staffing  CRNA: Henri Cobb CRNA  Performed: CRNA     Preanesthetic Checklist  Completed: patient identified, risks and benefits discussed and timeout performed    Insertion Related Data  Aseptic technique utilized  Insertion date/time: 6/13/2024 6:25 AM  Catheter Size: 18 G  Number of lumens: single lumen  Skin prep: chlorhexidine  Technique: ultrasound guided and modified Seldinger  Attempts: 1  Advanced: 10 cm  Total: 10 cm  Placement site: left basilic  Dressing: securement device and transparent dressing  Blood return: yes    Patient tolerated procedure: well  Post-procedure interventions: no blood pressure sign placed above bed, post-procedure education provided and patient verbalized understanding of education  Comments: The vein, identified above, was documented patent with ultrasound and concurrent real-time visualization of vascular needle entry was completed.  Ultrasound imaging of the vessel was obtained and stored via radiology PACS or hardcopy.

## 2024-06-13 NOTE — OP NOTE
Op Note:    Jamir Acevedo  6/13/2024    Preoperative diagnosis:  Severe two-vessel coronary artery disease  Bicuspid aortic valve with mild stenosis/trace regurgitation  Ascending aortic aneurysm 4.6 cm  Left atrial myxoma  Nonalcoholic fatty liver disease  Secondary nodules  Hiatal hernia  Hypertension  Hyperlipidemia  Chronic bronchitis    Postoperative diagnosis:  As above    Indications:  Patient is a 64-year-old white female with the above-mentioned medical problems who presents with dyspnea on exertion and was noted to have interatrial septal myxoma on the left atrial side in the setting of ascending aortic aneurysm with bicuspid aortic valve as well as two-vessel coronary disease.  All risks and benefits were discussed in detail patient to proceed with surgery and she wanted to go ahead.    Procedure:  Median sternotomy with resection of complex myxoma left atrial  Primary reconstruction of left atrial dome  Bovine patch interatrial septoplasty  CABG x 2 (LIMA to LAD, SVG to OM1)  Right endoscopic greater saphenous vein harvest  Ascending aorta resection with replacement with 28 mm /10 mm Hemashield graft  Deep hypothermic circulatory arrest  Retrograde cerebral perfusion via SVC  Anterior sternal plating with Katie [employer] plates  Application of Prevena wound VAC    Details of procedure:  Patient was brought to the operating room and placed on the table in the supine position.  Surgical areas were prepped and draped in a standard sterile fashion after general anesthesia was induced via endotracheal tube.  Lines were placed per anesthesia in the usual fashion.  A median sternotomy was performed and the left internal mammary artery was harvested in a pedicled fashion after applying the Rultract retractor.  Simultaneous right endoscopic greater saphenous vein harvest was being performed.  Prior to the distal transection of the left internal mammary artery patient was systemically heparinized with ACT guidance.   The artery was then prepared and placed in a papaverine soaked sponge. Greater saphenous vein was prepared by doubly clipping of its branches.  Rultract retractor was removed and distal retractor was placed.  Anterior thymic fat was transected longitudinally using electrocautery.  Pericardial well was created using 0 Ethibond sutures.  Aorto bicaval cannulation was performed and patient was placed on full cardiopulmonary bypass.  An LV vent stitch was placed and a venotomy was made but no catheter was placed due to the presence of the left atrial myxoma.  Retrograde coronary sinus catheter was placed for the same reason as needed for the LV vent.  Antegrade cardioplegia needle/vent was placed in the ascending aorta and connected to previously de-aired cardioplegia circuit.  An aortic cross-clamp was applied and 1200 cc of modified [place] cardioplegia was administered with excellent diastolic arrest. Cardioplegia was administered in intervals of 60 minutes during the entirety of the case. Core cooling to 18 °C was initiated for deep hypothermic circulatory arrest, and CO2 was insufflated during the entirety of the case.  Caval snares were tightened and a right atriotomy was made parallel to the AV groove approximately 2 to 3 cm.  Atrial edges were retracted and careful entry into the left atrium was made to the fossa ovalis laterally.  The sessile tumor was identified and extremely careful resection of the atrial myxoma was carried out using a combination of 15 blade as well as Metzenbaum scissors.  The dissection involve the entire interatrial septum as well as left atrial dome which was resected.  This was then sent off the field for permanent pathology.  Left atrium left ventricle was irrigated several times with cold saline and suction.  All debris was removed to the best of my ability.  An LV vent was placed through the previously placed LV vent stitch.  Left atrial dome was now primarily reconstructed with a  running 5-0 pledgeted Prolene suture.  A bovine patch was used to reconstruct the interatrial septum using continuous 4-0 Prolene suture.  Right atriotomy was closed in 2 layers using pledgeted 4-0 Prolene suture.  Distal anastomosis to the OM1 and LAD were constructed in an end-side fashion using continuous 7-0 Prolene suture with vein graft and LIMA.  The LIMA pedicle was anchored to the epicardium with two 5-0 Prolene sutures.  Bulldog was placed on the LIMA pedicle.  Patient had reached a core temperature of 18 °C and cardiopulmonary bypass was stopped and retrograde cerebral perfusion at 200-400 cc/min was initiated through the SVC cannula with maintenance of excellent cerebral oximetry.The aorta was entered with 11 blade and circumferentially dissected out off the main PA and the right pulmonary artery.  It was transected circumferentially and resected including the cannulation site. The distal aortic cuff was reinforced with double felt sandwich sandwich using running 4-0 Prolene suture.  Noted that there was excellent backbleeding from the head vessels.  The distal cuff was sized to a 28 mm / 10 mm Hemashield graft which was sewn in an end-to-end fashion using continuous 4-0 Prolene suture.  The side branch was then connected to the arterial limb of the cardiopulmonary bypass circuit and tied in position.  Reperfusion was initiated slowly after stopping retrograde cerebral perfusion and de-airing was accomplished.  A clamp was placed below the side branch with excellent hemostasis of the distal suture line.  The proximal aortic cuff was trimmed and down to the level of the coronary arteries and the cuff was reinforced with a double felt sandwich using continuous 4-0 Prolene suture.  The aortic valve was resuspended.  Once the proximal cuff was reinforced the Hemashield graft was sewn in end-to-end fashion using continuous 4-0 Prolene suture.  A root vent was placed over the graft and reinforced with a four  0-0 Prolene suture.  1 proximal anastomosis was constructed in an end-to-side fashion using continuous 6-0 Prolene suture after creating a graftotomy using eye cautery.  Bulldog was placed on the vein graft and routine de-airing maneuvers were performed and aortic cross-clamp was released.  Ventilations were restarted.  Atrial and ventricular bipolar pacing wires were placed.  Once patient reached a core temperature of 36 °C patient was gradually weaned off cardiopulmonary bypass with moderate inotropic and vasopressor support had a paced rhythm.  Transesophageal echocardiography showed excellent biventricular function with no evidence of inter atrial shunt.  Aortic valve was noted to be intact with trace aortic regurgitation.  Protamine was administered reverse heparinization as well as multiple blood products as expected in these types of cases.  Once there was good hemostasis three 24 Citizen of the [place] Michael drains were placed in the anterior posterior pericardium as well as left pleural space and brought out through separate stab incisions and anchored to skin using 0 Ethibond sutures.  The sternum was reapproximated using a combination of #6, #8 sternal wires as well as anterior sternal plating system utilizing the Katie [employer] system (2 curvilinear and 1 H plate) .Layered anatomical closure was accomplished in routine fashion.  Prevena wound VAC was then applied over the skin to decrease the risk of deep sternal wound infection.    All needle, sponge as well as instrument counts were correct.  Patient was transported to ICU critical stable condition.    Surgeon(s):  Cash Huynh MD    Anesthesia:  Anesthesiologist: Eliceo Pantoja MD  CRNA: Henri Cobb CRNA; Anupama Berg CRNA  Perfusionist: Yury Slater, CCP; Jadiel Loza CCP  General      Pain Block:  For acute post-operative pain management    Staff:   Circulator: Erika Prescott RN  Relief Circulator: Clarisse Bates RN  Relief Scrub: Citlali Lowe  "RN  Scrub Person: Angie Macario; Shira Garcia  First Assist: Yarelis Cobb RN; Darian Corey RN  Non-employed DUTCH: Neeta Trejo    Estimated Blood Loss:  1L ( returned via cell saver and pump)    Fluids:  Check anesthesia record    Operative findings:  Moderate to large sized sessile left atrial myxoma attached to the interatrial septum as well as a left atrial dome  Extensive resection necessitating resection of left atrial dome as well as large portion of the interatrial septum  LIMA small but had good flow.  Greater saphenous vein was of adequate quality and caliber. LAD and OM1 were good targets  Thin ascending aorta with aneurysmal changes  No interatrial shunt and stable biventricular function post myxoma resection, CABG, ascending aorta replacement    Specimens:  Order Name Source Comment Collection Info Order Time   THROMBOELASTROGRAPH Blood, Venous   6/13/2024  5:16 AM     Which TEG test is needed?   TEG- Global Hemostasis/Adult Cardiac and Cardiology Procedures (K, KH, RT, FF)          Which TEG test is needed?   TEG- PlateletMapping® ADP & AA/TBD (Platelet Inhibition)        BLOOD BANK COMMUNICATION No Specimen Collected   6/13/2024  6:27 AM     Message reason (select all that apply)   Product Standby Request (Inventory)          Inventory Comment   6 units FFP, platelets, cryo        THROMBOELASTROGRAPH Blood, Venous   6/13/2024  2:45 PM     Which TEG test is needed?   TEG- Global Hemostasis/Adult Cardiac and Cardiology Procedures (K, KH, RT, FF)        CBC WITH AUTO DIFFERENTIAL Blood, Venous   6/13/2024  3:19 PM   BASIC METABOLIC PANEL Blood, Venous   6/13/2024  3:19 PM   PROTIME-INR Blood, Venous   6/13/2024  3:19 PM   PTT (ACTIVATED PARTIAL THROMBOPLASTIN TIME) Blood, Venous   6/13/2024  3:19 PM   FIBRINOGEN Blood, Venous   6/13/2024  3:19 PM   PREPARE RBC  If Uncrossmatched/Emergent blood is set to yes then I understand the term \"uncrossmatched\" to mean that testing for donor/recipient " "compatibility is incomplete but in progress.    I believe in spite of the above, the patient's condition warrants transfusion.  6/13/2024  6:26 AM     Uncrossmatched / Emergent   No          Transfusion indications   Potential significant blood loss (i.e. surgery)          Transfusion duration per unit (hrs):   2 Hours        PREPARE RBC  If Uncrossmatched/Emergent blood is set to yes then I understand the term \"uncrossmatched\" to mean that testing for donor/recipient compatibility is incomplete but in progress.    I believe in spite of the above, the patient's condition warrants transfusion.  6/13/2024 11:07 AM     Uncrossmatched / Emergent   No          Transfusion indications   Potential significant blood loss (i.e. surgery)          Transfusion duration per unit (hrs):   2 Hours        PREPARE PLATELET PHERESIS    6/13/2024 12:40 PM     Emergent   No          Transfusion indications   Other (Specify):          Other indication   surgery          Transfusion duration per unit (hrs):   1 Hour        PREPARE FRESH FROZEN PLASMA Blood, Venous   6/13/2024 12:40 PM     Emergent   No          Transfusion indications   Other (Specify):          Other indication   surgery          Transfusion duration per unit (hrs):   1 Hour        PREPARE CRYOPRECIPITATE (POOLED) Blood, Venous   6/13/2024 12:40 PM     Emergent   No          Transfusion indications   Other (Specify):          Other indication   surgery        PREPARE CRYOPRECIPITATE (POOLED) Blood, Venous   6/13/2024  2:45 PM     Emergent   No          Transfusion indications   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 (Active)       Y Chest Tube 1 and 2 1 Anterior Mediastinal 24 Fr. 2 Posterior Mediastinal 24 Fr. (Active)       Complications:  None    Disposition:  ICU    Cash Huynh MD  Phone Number: [phone]    Date: 6/13/2024  Time: 3:43 PM

## 2024-06-13 NOTE — ANESTHESIA PROCEDURE NOTES
Central Venous Line:    Date/Time: 6/13/2024 7:25 AM    A central venous line was placed in the OR for the following indication(s): central venous access and CVP monitoring.    Sterility preparation included the following: provider hand hygiene performed prior to central venous catheter insertion, all 5 sterile barriers used (gloves, gown, cap, mask, large sterile drape) during central venous catheter insertion and skin prep agent completely dried prior to procedure.    The patient was placed in Trendelenburg position. Right internal jugular vein was prepped.    The site was prepped with Chloraprep.    A 9 Fr (size), 10 (length), introducer single lumen was placed.  This catheter was not an oximetric catheter.    During the procedure, the following specific steps were taken: target vein identified, needle advanced into vein and blood aspirated and guidewire advanced into vein.  Seldinger technique used    Procedure performed using ultrasound guidance  Sterile gel and probe cover used in ultrasound-guided central venous catheter insertion.    Intravenous verification was obtained by ultrasound and venous blood return.    Post insertion care included: all ports aspirated, all ports flushed easily, guidewire removed intact, Biopatch applied, line sutured in place and dressing applied.    During the procedure the patient experienced: patient tolerated procedure well with no complications.      A(n) non-oximetric, 7.5 (size) Pulmonary Artery Catheter (PAC) was placed through the Introducer CVL in the right internal jugular vein.  The PAC placement was confirmed by pressure tracing changes and KENDRICK.  The patient experienced the following events during the procedure: patient tolerated procedure well with no complications.  Staffing  Anesthesiologist: Eliceo Pantoja MD  Performed: anesthesiologist

## 2024-06-14 ENCOUNTER — APPOINTMENT (OUTPATIENT)
Dept: RADIOLOGY | Facility: HOSPITAL | Age: 64
DRG: 220 | End: 2024-06-14
Payer: MEDICARE

## 2024-06-14 ENCOUNTER — APPOINTMENT (OUTPATIENT)
Dept: CARDIAC REHAB | Facility: HOSPITAL | Age: 64
DRG: 220 | End: 2024-06-14
Payer: MEDICARE

## 2024-06-14 LAB
ALBUMIN SERPL-MCNC: 4.4 G/DL (ref 3.5–5.3)
ANION GAP SERPL CALC-SCNC: 11 MMOL/L (ref 3–11)
ANION GAP SERPL CALC-SCNC: 11 MMOL/L (ref 3–11)
ANION GAP SERPL CALC-SCNC: 12 MMOL/L (ref 3–11)
BACTERIA UR CULT: ABNORMAL
BSA FOR ECHO PROCEDURE: 2.15 M2
BUN SERPL-MCNC: 20 MG/DL (ref 7–25)
BUN SERPL-MCNC: 20 MG/DL (ref 7–25)
BUN SERPL-MCNC: 21 MG/DL (ref 7–25)
CALCIUM ALBUM COR SERPL-MCNC: 9.1 MG/DL (ref 8.6–10.3)
CALCIUM SERPL-MCNC: 8.8 MG/DL (ref 8.6–10.3)
CALCIUM SERPL-MCNC: 9.4 MG/DL (ref 8.6–10.3)
CHLORIDE SERPL-SCNC: 106 MMOL/L (ref 98–107)
CHLORIDE SERPL-SCNC: 108 MMOL/L (ref 98–107)
CHLORIDE SERPL-SCNC: 113 MMOL/L (ref 98–107)
CO2 SERPL-SCNC: 25 MMOL/L (ref 21–32)
CO2 SERPL-SCNC: 29 MMOL/L (ref 21–32)
CO2 SERPL-SCNC: 29 MMOL/L (ref 21–32)
CREAT SERPL-MCNC: 1.12 MG/DL (ref 0.6–1.1)
CREAT SERPL-MCNC: 1.19 MG/DL (ref 0.6–1.1)
CREAT SERPL-MCNC: 1.33 MG/DL (ref 0.6–1.1)
EGFRCR SERPLBLD CKD-EPI 2021: 44 ML/MIN/1.73M*2
EGFRCR SERPLBLD CKD-EPI 2021: 51 ML/MIN/1.73M*2
EGFRCR SERPLBLD CKD-EPI 2021: 55 ML/MIN/1.73M*2
ERYTHROCYTE [DISTWIDTH] IN BLOOD BY AUTOMATED COUNT: 15.8 % (ref 11.5–14)
GLUCOSE BLDC GLUCOMTR-MCNC: 107 MG/DL (ref 70–105)
GLUCOSE BLDC GLUCOMTR-MCNC: 110 MG/DL (ref 70–105)
GLUCOSE BLDC GLUCOMTR-MCNC: 112 MG/DL (ref 70–105)
GLUCOSE BLDC GLUCOMTR-MCNC: 113 MG/DL (ref 70–105)
GLUCOSE BLDC GLUCOMTR-MCNC: 115 MG/DL (ref 70–105)
GLUCOSE BLDC GLUCOMTR-MCNC: 116 MG/DL (ref 70–105)
GLUCOSE BLDC GLUCOMTR-MCNC: 118 MG/DL (ref 70–105)
GLUCOSE BLDC GLUCOMTR-MCNC: 119 MG/DL (ref 70–105)
GLUCOSE BLDC GLUCOMTR-MCNC: 119 MG/DL (ref 70–105)
GLUCOSE BLDC GLUCOMTR-MCNC: 120 MG/DL (ref 70–105)
GLUCOSE BLDC GLUCOMTR-MCNC: 125 MG/DL (ref 70–105)
GLUCOSE BLDC GLUCOMTR-MCNC: 131 MG/DL (ref 70–105)
GLUCOSE BLDC GLUCOMTR-MCNC: 132 MG/DL (ref 70–105)
GLUCOSE BLDC GLUCOMTR-MCNC: 138 MG/DL (ref 70–105)
GLUCOSE BLDC GLUCOMTR-MCNC: 164 MG/DL (ref 70–105)
GLUCOSE SERPL-MCNC: 121 MG/DL (ref 70–105)
GLUCOSE SERPL-MCNC: 134 MG/DL (ref 70–105)
GLUCOSE SERPL-MCNC: 177 MG/DL (ref 70–105)
HCT VFR BLD AUTO: 31.9 % (ref 34–45)
HGB BLD-MCNC: 11.2 G/DL (ref 11.5–15.5)
MAGNESIUM SERPL-MCNC: 2.8 MG/DL (ref 1.8–2.4)
MCH RBC QN AUTO: 30.5 PG (ref 28–33)
MCHC RBC AUTO-ENTMCNC: 35.1 G/DL (ref 32–36)
MCV RBC AUTO: 86.7 FL (ref 81–97)
PLATELET # BLD AUTO: 190 10*3/UL (ref 140–350)
PMV BLD AUTO: 7.4 FL (ref 6.9–10.8)
POTASSIUM SERPL-SCNC: 3.2 MMOL/L (ref 3.5–5.1)
POTASSIUM SERPL-SCNC: 3.3 MMOL/L (ref 3.5–5.1)
POTASSIUM SERPL-SCNC: 3.5 MMOL/L (ref 3.5–5.1)
POTASSIUM SERPL-SCNC: 3.6 MMOL/L (ref 3.5–5.1)
RBC # BLD AUTO: 3.68 10*6/ΜL (ref 3.7–5.3)
SODIUM SERPL-SCNC: 147 MMOL/L (ref 135–145)
SODIUM SERPL-SCNC: 148 MMOL/L (ref 135–145)
SODIUM SERPL-SCNC: 149 MMOL/L (ref 135–145)
WBC # BLD AUTO: 7.9 10*3/UL (ref 4.5–10.5)

## 2024-06-14 PROCEDURE — 93798 PHYS/QHP OP CAR RHAB W/ECG: CPT

## 2024-06-14 PROCEDURE — 2580000300 HC RX 258

## 2024-06-14 PROCEDURE — P9045 ALBUMIN (HUMAN), 5%, 250 ML: HCPCS | Mod: TB,JZ

## 2024-06-14 PROCEDURE — 97530 THERAPEUTIC ACTIVITIES: CPT | Mod: GO

## 2024-06-14 PROCEDURE — 6360000200 HC RX 636 W HCPCS (ALT 250 FOR IP)

## 2024-06-14 PROCEDURE — 97161 PT EVAL LOW COMPLEX 20 MIN: CPT | Mod: GP | Performed by: PHYSICAL THERAPIST

## 2024-06-14 PROCEDURE — 94640 AIRWAY INHALATION TREATMENT: CPT

## 2024-06-14 PROCEDURE — 83735 ASSAY OF MAGNESIUM: CPT

## 2024-06-14 PROCEDURE — (BLANK) HC RECOVERY PHASE-1 1ST  HOUR ACUITY LEVEL 3

## 2024-06-14 PROCEDURE — (BLANK) HC ROOM ICU MEDICAL

## 2024-06-14 PROCEDURE — 99232 SBSQ HOSP IP/OBS MODERATE 35: CPT | Performed by: INTERNAL MEDICINE

## 2024-06-14 PROCEDURE — 97530 THERAPEUTIC ACTIVITIES: CPT | Mod: GP | Performed by: PHYSICAL THERAPIST

## 2024-06-14 PROCEDURE — 93005 ELECTROCARDIOGRAM TRACING: CPT

## 2024-06-14 PROCEDURE — 6370000100 HC RX 637 (ALT 250 FOR IP): Performed by: PHYSICIAN ASSISTANT

## 2024-06-14 PROCEDURE — 84132 ASSAY OF SERUM POTASSIUM: CPT | Performed by: THORACIC SURGERY (CARDIOTHORACIC VASCULAR SURGERY)

## 2024-06-14 PROCEDURE — 2590000100 HC RX 259

## 2024-06-14 PROCEDURE — 36600 WITHDRAWAL OF ARTERIAL BLOOD: CPT

## 2024-06-14 PROCEDURE — 6370000100 HC RX 637 (ALT 250 FOR IP)

## 2024-06-14 PROCEDURE — 36415 COLL VENOUS BLD VENIPUNCTURE: CPT | Performed by: THORACIC SURGERY (CARDIOTHORACIC VASCULAR SURGERY)

## 2024-06-14 PROCEDURE — 71045 X-RAY EXAM CHEST 1 VIEW: CPT

## 2024-06-14 PROCEDURE — 85027 COMPLETE CBC AUTOMATED: CPT

## 2024-06-14 PROCEDURE — 82947 ASSAY GLUCOSE BLOOD QUANT: CPT | Mod: QW

## 2024-06-14 PROCEDURE — 6360000200 HC RX 636 W HCPCS (ALT 250 FOR IP): Performed by: THORACIC SURGERY (CARDIOTHORACIC VASCULAR SURGERY)

## 2024-06-14 PROCEDURE — 97165 OT EVAL LOW COMPLEX 30 MIN: CPT | Mod: GO

## 2024-06-14 PROCEDURE — 93010 ELECTROCARDIOGRAM REPORT: CPT | Mod: NCNR | Performed by: INTERNAL MEDICINE

## 2024-06-14 PROCEDURE — 74018 RADEX ABDOMEN 1 VIEW: CPT

## 2024-06-14 PROCEDURE — 99024 POSTOP FOLLOW-UP VISIT: CPT | Performed by: PHYSICIAN ASSISTANT

## 2024-06-14 PROCEDURE — 6360000200 HC RX 636 W HCPCS (ALT 250 FOR IP): Performed by: PHYSICIAN ASSISTANT

## 2024-06-14 PROCEDURE — 80048 BASIC METABOLIC PNL TOTAL CA: CPT

## 2024-06-14 RX ORDER — FUROSEMIDE 10 MG/ML
40 INJECTION INTRAMUSCULAR; INTRAVENOUS ONCE
Status: COMPLETED | OUTPATIENT
Start: 2024-06-14 | End: 2024-06-14

## 2024-06-14 RX ORDER — POTASSIUM CHLORIDE 29.8 MG/ML
20 INJECTION INTRAVENOUS ONCE
Status: COMPLETED | OUTPATIENT
Start: 2024-06-14 | End: 2024-06-14

## 2024-06-14 RX ORDER — AMLODIPINE BESYLATE 5 MG/1
2.5 TABLET ORAL DAILY
Status: DISCONTINUED | OUTPATIENT
Start: 2024-06-14 | End: 2024-06-14

## 2024-06-14 RX ORDER — PROMETHAZINE HYDROCHLORIDE 25 MG/1
25 SUPPOSITORY RECTAL EVERY 6 HOURS PRN
Status: DISCONTINUED | OUTPATIENT
Start: 2024-06-14 | End: 2024-06-17 | Stop reason: HOSPADM

## 2024-06-14 RX ORDER — POTASSIUM CHLORIDE 29.8 MG/ML
20 INJECTION INTRAVENOUS ONCE
Status: COMPLETED | OUTPATIENT
Start: 2024-06-14 | End: 2024-06-15

## 2024-06-14 RX ORDER — ROSUVASTATIN CALCIUM 20 MG/1
20 TABLET, COATED ORAL NIGHTLY
Status: DISCONTINUED | OUTPATIENT
Start: 2024-06-14 | End: 2024-06-17 | Stop reason: HOSPADM

## 2024-06-14 RX ORDER — SCOPOLAMINE 1 MG/3D
1 PATCH, EXTENDED RELEASE TRANSDERMAL
Status: DISCONTINUED | OUTPATIENT
Start: 2024-06-14 | End: 2024-06-17 | Stop reason: HOSPADM

## 2024-06-14 RX ORDER — AMLODIPINE BESYLATE 5 MG/1
5 TABLET ORAL 2 TIMES DAILY
Status: DISCONTINUED | OUTPATIENT
Start: 2024-06-14 | End: 2024-06-14

## 2024-06-14 RX ORDER — INSULIN ASPART 100 [IU]/ML
0-15 INJECTION, SOLUTION INTRAVENOUS; SUBCUTANEOUS
Status: DISCONTINUED | OUTPATIENT
Start: 2024-06-14 | End: 2024-06-17

## 2024-06-14 RX ORDER — AMLODIPINE BESYLATE 5 MG/1
2.5 TABLET ORAL DAILY
Status: DISCONTINUED | OUTPATIENT
Start: 2024-06-15 | End: 2024-06-15

## 2024-06-14 RX ORDER — FUROSEMIDE 10 MG/ML
40 INJECTION INTRAMUSCULAR; INTRAVENOUS
Status: DISCONTINUED | OUTPATIENT
Start: 2024-06-14 | End: 2024-06-14 | Stop reason: ALTCHOICE

## 2024-06-14 RX ADMIN — ONDANSETRON 4 MG: 2 INJECTION INTRAMUSCULAR; INTRAVENOUS at 01:06

## 2024-06-14 RX ADMIN — AMIODARONE HYDROCHLORIDE 150 MG: 1.5 INJECTION, SOLUTION INTRAVENOUS at 22:30

## 2024-06-14 RX ADMIN — AMLODIPINE BESYLATE 2.5 MG: 5 TABLET ORAL at 09:36

## 2024-06-14 RX ADMIN — FUROSEMIDE 40 MG: 10 INJECTION, SOLUTION INTRAMUSCULAR; INTRAVENOUS at 02:31

## 2024-06-14 RX ADMIN — ONDANSETRON 4 MG: 2 INJECTION INTRAMUSCULAR; INTRAVENOUS at 15:38

## 2024-06-14 RX ADMIN — LANSOPRAZOLE 15 MG: 15 CAPSULE, DELAYED RELEASE ORAL at 15:18

## 2024-06-14 RX ADMIN — FUROSEMIDE 30 MG/HR: 10 INJECTION, SOLUTION INTRAMUSCULAR; INTRAVENOUS at 19:56

## 2024-06-14 RX ADMIN — POTASSIUM CHLORIDE 20 MEQ: 29.8 INJECTION, SOLUTION INTRAVENOUS at 17:07

## 2024-06-14 RX ADMIN — POLYETHYLENE GLYCOL 3350 17 G: 17 POWDER, FOR SOLUTION ORAL at 09:36

## 2024-06-14 RX ADMIN — POTASSIUM CHLORIDE 20 MEQ: 29.8 INJECTION, SOLUTION INTRAVENOUS at 10:22

## 2024-06-14 RX ADMIN — FUROSEMIDE 40 MG/HR: 10 INJECTION, SOLUTION INTRAMUSCULAR; INTRAVENOUS at 08:47

## 2024-06-14 RX ADMIN — OXYCODONE 5 MG: 5 TABLET ORAL at 21:06

## 2024-06-14 RX ADMIN — CLOPIDOGREL BISULFATE 75 MG: 75 TABLET ORAL at 09:36

## 2024-06-14 RX ADMIN — CEFAZOLIN 2000 MG: 2 INJECTION, POWDER, FOR SOLUTION INTRAMUSCULAR; INTRAVENOUS at 04:24

## 2024-06-14 RX ADMIN — POTASSIUM CHLORIDE 20 MEQ: 29.8 INJECTION, SOLUTION INTRAVENOUS at 01:04

## 2024-06-14 RX ADMIN — POTASSIUM CHLORIDE 20 MEQ: 29.8 INJECTION, SOLUTION INTRAVENOUS at 15:19

## 2024-06-14 RX ADMIN — METOCLOPRAMIDE HYDROCHLORIDE 5 MG: 5 INJECTION INTRAMUSCULAR; INTRAVENOUS at 07:30

## 2024-06-14 RX ADMIN — ALBUMIN (HUMAN) 12.5 G: 12.5 INJECTION, SOLUTION INTRAVENOUS at 01:05

## 2024-06-14 RX ADMIN — IPRATROPIUM BROMIDE 0.5 MG: 0.5 SOLUTION RESPIRATORY (INHALATION) at 14:33

## 2024-06-14 RX ADMIN — LEVALBUTEROL HYDROCHLORIDE 1.25 MG: 1.25 SOLUTION RESPIRATORY (INHALATION) at 07:54

## 2024-06-14 RX ADMIN — Medication 1000 MG: at 17:05

## 2024-06-14 RX ADMIN — ACETAMINOPHEN 650 MG: 650 LIQUID ORAL at 23:58

## 2024-06-14 RX ADMIN — ETHYL ALCOHOL 1 APPLICATION: 62 SWAB TOPICAL at 21:07

## 2024-06-14 RX ADMIN — POTASSIUM CHLORIDE 20 MEQ: 29.8 INJECTION, SOLUTION INTRAVENOUS at 18:30

## 2024-06-14 RX ADMIN — PROMETHAZINE HYDROCHLORIDE 25 MG: 25 SUPPOSITORY RECTAL at 02:00

## 2024-06-14 RX ADMIN — METOCLOPRAMIDE HYDROCHLORIDE 5 MG: 5 INJECTION INTRAMUSCULAR; INTRAVENOUS at 19:50

## 2024-06-14 RX ADMIN — ETHYL ALCOHOL 1 APPLICATION: 62 SWAB TOPICAL at 09:37

## 2024-06-14 RX ADMIN — Medication 1000 MG: at 12:33

## 2024-06-14 RX ADMIN — NAPROXEN SODIUM 81 MG: 220 TABLET, FILM COATED ORAL at 09:36

## 2024-06-14 RX ADMIN — POTASSIUM CHLORIDE 20 MEQ: 29.8 INJECTION, SOLUTION INTRAVENOUS at 23:04

## 2024-06-14 RX ADMIN — SCOPALAMINE 1 PATCH: 1 PATCH, EXTENDED RELEASE TRANSDERMAL at 09:36

## 2024-06-14 RX ADMIN — POTASSIUM CHLORIDE 20 MEQ: 29.8 INJECTION, SOLUTION INTRAVENOUS at 19:58

## 2024-06-14 RX ADMIN — POTASSIUM CHLORIDE 20 MEQ: 29.8 INJECTION, SOLUTION INTRAVENOUS at 05:47

## 2024-06-14 RX ADMIN — SENNOSIDES AND DOCUSATE SODIUM 1 TABLET: 50; 8.6 TABLET ORAL at 09:36

## 2024-06-14 RX ADMIN — POTASSIUM CHLORIDE 20 MEQ: 29.8 INJECTION, SOLUTION INTRAVENOUS at 12:35

## 2024-06-14 RX ADMIN — ROSUVASTATIN CALCIUM 20 MG: 20 TABLET, FILM COATED ORAL at 19:50

## 2024-06-14 RX ADMIN — LEVALBUTEROL HYDROCHLORIDE 1.25 MG: 1.25 SOLUTION RESPIRATORY (INHALATION) at 19:39

## 2024-06-14 RX ADMIN — TRAMADOL HYDROCHLORIDE 50 MG: 50 TABLET, FILM COATED ORAL at 19:50

## 2024-06-14 RX ADMIN — SENNOSIDES AND DOCUSATE SODIUM 1 TABLET: 50; 8.6 TABLET ORAL at 19:50

## 2024-06-14 RX ADMIN — FUROSEMIDE 40 MG: 10 INJECTION, SOLUTION INTRAMUSCULAR; INTRAVENOUS at 07:30

## 2024-06-14 RX ADMIN — GLYCERIN, HYPROMELLOSE, POLYETHYLENE GLYCOL 2 DROP: .2; .2; 1 LIQUID OPHTHALMIC at 02:00

## 2024-06-14 RX ADMIN — IPRATROPIUM BROMIDE 0.5 MG: 0.5 SOLUTION RESPIRATORY (INHALATION) at 19:39

## 2024-06-14 RX ADMIN — IPRATROPIUM BROMIDE 0.5 MG: 0.5 SOLUTION RESPIRATORY (INHALATION) at 07:54

## 2024-06-14 RX ADMIN — POTASSIUM CHLORIDE 20 MEQ: 29.8 INJECTION, SOLUTION INTRAVENOUS at 11:31

## 2024-06-14 RX ADMIN — ENOXAPARIN SODIUM 40 MG: 100 INJECTION SUBCUTANEOUS at 07:30

## 2024-06-14 RX ADMIN — LEVALBUTEROL HYDROCHLORIDE 1.25 MG: 1.25 SOLUTION RESPIRATORY (INHALATION) at 14:33

## 2024-06-14 RX ADMIN — MORPHINE SULFATE 4 MG: 4 INJECTION INTRAVENOUS at 01:02

## 2024-06-14 NOTE — MEDICATION HISTORY SPECIALIST NOTES
[facility] SICU [id number]    Patients medication history was entered into Epic by nurse. Pharmacy Medication History Specialist reviewed nurse's update and verified with resource(s). No discrepancies were noted. Medication History Specialist did not interview this patient; however, will follow up if requested.    Information sources:  EPIC  Complete Dispense Report

## 2024-06-14 NOTE — INTERDISCIPLINARY/THERAPY
06/14/24 1330   Phase I Resting    Heart Rate (bpm) 60 bpm   Blood Pressure 121/49   O2 Flow Rate 4 L/min   Comment RN ok'd amb   Phase I Exercise   Heart Rate (bpm) 73 bpm   Blood Pressure 131/49   O2 Flow Rate 4 L/min   Ambulation Distance (meters) 15 meters   Tolerance Fair   Assistive Device Wheelchair;Gait belt   Equipment Chest tube;Phillips;IV pole;Oxygen;Portable telemetry;Temporary pacemaker;Wound vac   Amount of Assistance Required 2 Assist   Comment Pt amb fair with no c/o.   Phase I Recovery   Heart Rate (bpm) 58 bpm   Blood Pressure 117/48   O2 Flow Rate 4 L/min   Comment Pt left resting in chair with family in the room and call light in reach.   Cardiac Rehab Phase I   Amount of Time Spent with Patient (mins) 30 minutes
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
## 2024-06-14 NOTE — INTERDISCIPLINARY/THERAPY
"Case Management Admission Note    Phone # [phone]    Living Situation: Spouse/significant other Private residence            ADLs: Independent  Stairs: No    HME/CPAP: None      Oxygen: No      Home Health:No     Current Resources: None      Diabetes/supplies: Do you have Diabetes?: No  PCP: Jacinta Armando MD  Funding: [employer]  Pharmacy:[facility], [place] - [address]    Source of Information: Chart Review  Reason for Chart Review: Patient Unavailable (Comment)  Support Person: Primary Emergency Contact: Jeronimo Acevedo \"Kareem\", Home Phone: [phone], Mobile Phone: [phone], Relation: Spouse     Transportation: When discharged, who will be providing your transportation?: Family  Discharge Transport Person's Name:      Admission Diagnosis: intra-septal mass. S/p Median sternotomy w/ resection of complex myxoma left, Primary reconstruction of left atrial dome, interatrial septoplasty CABG x 2, Ascending aorta resection w/ graft    Narrative: Chart reviewed.  Discussed plan of care in MDR with  Kareem, Dr. Reid, Elizabeth IRIZARRY and team.    Dispo: HO    "

## 2024-06-14 NOTE — CONSULTATION
Critical care progress note:    Assessment/plan-  Patient is a 64-year-old female status post pump assisted CABG x 2, resection of complex left atrial myxoma with primary reconstruction of left atrial dome, bovine patch intra atrial septoplasty with ascending aortic replacement with deep hypothermic circulatory arrest and retrograde cerebral perfusion.    Status post CABG x 2, left atrial myxoma resection, ascending aortic reconstruction  Postoperative respiratory insufficiency, expected  Acute blood loss anemia, expected  Hyponatremia  Hypokalemia  CAD  Bicuspid aortic valve with mild stenosis/trace regurgitation  Primary hypertension  Hyperlipidemia  Chronic bronchitis  Metabolic dysfunction associated steatohepatitis    6/14: Patient is doing well from a respiratory standpoint.  She was able to be extubated yesterday per protocol.  She is currently on 4 L nasal cannula with oxygen saturations in the mid to high 90s less likely to be weaned further.  She is utilizing her incentive spirometer and Acapella device.  She does have bronchial hygiene orders in place.  She does note some pain with deep inspiration but otherwise controlled.  [facility] gave a dose of Lasix this morning.  No wheezing or history of obstructive disease thus nebs can be discontinued from CCM standpoint.  Postop management otherwise per [facility].    6/13: Patient is a 64-year-old female status post pump assisted CABG x 2, resection of complex left atrial myxoma with primary reconstruction of left atrial dome, bovine patch intra atrial septoplasty with ascending aortic replacement with deep hypothermic circulatory arrest and retrograde cerebral perfusion.  Patient with a history of severe two-vessel coronary disease, bicuspid aortic valve with mild stenosis/trace regurgitation with an ascending aortic aneurysm, primary hypertension, hyperlipidemia, chronic bronchitis, nonalcoholic fatty liver disease, status post hysterectomy status post umbilical hernia  repair status post tonsillectomy status post cholecystectomy.      Patient is on dobutamine, clevidipine, insulin and Precedex infusions postoperatively.Currently normal sinus rhythm without currently normal sinus rhythm without any pacer requirement.  She was requiring 100% FiO2 but has been able to wean down to be 85% with a PEEP of 10.  If continues to be able to wean down, she could be extubated per protocol.  Bronchial hygiene orders will be placed.  Recent UTI for which patient is on Bactrim.  Recent urine culture showing E. coli for which patient has been on Bactrim with planned 5-day course to be completed on 6/17.  Postoperative labs showing mild hyponatremia and hypokalemia with a mildly elevated anion gap.  Creatinine is not different from preoperatively.  CBC with normal WBC and expected acute blood loss anemia with normal platelets.      Physical exam-  General: Awake, alert, answering questions appropriately  CV: Regular rate and rhythm  Respiratory: No increased work of breathing on nasal cannula  Abdomen: Obese, soft, nontender, nondistended  Extremities: Mild edema, warm  Neuro: Moving extremities without evidence of deficit, speech is clear      [facility]  Critical care medicine

## 2024-06-14 NOTE — PROGRESS NOTES
CARDIOTHORACIC & VASCULAR SURGERY PROGRESS NOTE    PROCEDURE PERFORMED:Median sternotomy with resection of complex myxoma left atrial  Primary reconstruction of left atrial dome  Bovine patch interatrial septoplasty  CABG x 2 (LIMA to LAD, SVG to OM1)  Right endoscopic greater saphenous vein harvest  Ascending aorta resection with replacement with 28 mm /10 mm Hemashield graft  Deep hypothermic circulatory arrest  Retrograde cerebral perfusion via SVC  Anterior sternal plating with Katie [employer] plates  Application of Prevena wound VAC  DATE: 6/13/24  SURGEON: DR. Huynh    Hospital Course:    6/14 POD #1: Extubated to 3L NC, Lower CI overnight requiring pacing at 80bpm, also vagal episode with bradycardia to 30s resolved with pacing. 7.5L positive. Diurese started overnight. Likely start lasix gtt. Dobutamine off paced at 70bpm    ROS: Pertinent positives noted above. Patient denies SOB, chest pain, palpitations and all other pertinent 12 point ROS are essentially negative.     HPI:Ms. Acevedo is a 64-year-old female with PMHx nonalcoholic fatty liver disease, thyroid nodules, hiatal hernia, chronic bronchitis, HTN, HLD, ascending aortic aneurysm (4.5 cm), bicuspid aortic valve, and intra-septal mass who presents for imaging follow-up with Dr. Huynh.  Referred from general cardiology Dr. Pulliam.       Patient was having some increased fatigue in January.  3/14/24 KENDRICK demonstrated a new 1.4 x 1.6 cm well-circumscribed mass attached to the intra-atrial septum near the fossa ovalis.  Bicuspid aortic valve continues to only have mild stenosis with trace regurgitation with aortic root measuring 4.06 cm.  Follow-up carotid with mild, not hemodynamically significant stenosis in bilateral carotids, CTA imaging demonstrated mild atherosclerotic calcifications and 4.5 cm ascending thoracic aortic aneurysm with no evidence of dissection.  Aneurysm is 3.96 cm in 2021, then in July 2023 was 4.2 cm. Left heart cath  "demonstrated 70% LAD ostia stenosis and mid LAD 80% stenosis along with mid RCA 30% stenosis. Thoracic surgical history of left breast biopsies but no large surgeries or radiation therapy.     Patient is active around the house with gardening and yard work.  Non-smoker.  Occasional alcohol use.  No illicit drug use.  Will occasionally get dizzy/lightheaded and increased dyspnea with walking uphill.  However no edema, orthopnea, PND, or chest pain.  Patient does note that she does not want a mechanical valve as she was not want to be on warfarin.       Patient has returned today to discuss results of coronary angiogram.  Also had long discussion with [facility] blood supply and found the process to be exhausted and unlikely to have enough donors for what is necessary.  Patient and family decided to accept generic donor blood to speed up process.  Continues to deny chest pain, edema, or new palpitations         OBJECTIVE:    Vital Signs (Last 24 hours)  /57   Pulse 68   Temp (!) 32.6 °C (90.7 °F)   Resp 16   Ht 1.676 m (5' 6\")   Wt 99.2 kg (218 lb 11.2 oz)   SpO2 97%   BMI 35.30 kg/m²     PHYSICAL EXAM:  GENERAL: Well nourished, no acute distress  NEURO: Alert and Oriented x3, no focal deficits, moves all extremities   HEENT: PERRLA, normocephalic, atraumatic,   CARDIAC: RRR, no murmur, gallop, or rub appreciated   LUNGS: Clear to auscultation bilaterally, unlabored breathing   ABDOMEN: Soft  NTND, + BS in all 4 quadrants   SKIN: No obvious, rashes, edema, or lesions   PERIPHERAL VASCULAR: + dopplerable pulses bilaterally   PSYCH: Normal mood and affect  TUBES/LINES/WIRE: peripheral IV's   INCISIONS: Clean, dry, intact: PWV to sternum with good suction, no drainage. SVG is healing well without infectious signs.     Chest Tube Output Last 12/24hrs:     M:   P:     Cardiovascular/Inotropic Gtts:  dexmedeTOMIDine (PRECEDEX) 400 mcg in  mL infusion (premix), 0.2-1.5 mcg/kg/hr, Last Rate: Stopped (06/13/24 " 1810)  phenylephrine (ANGEL-SYNEPHRINE) 40 mg in sodium chloride 0.9 % 250 mL infusion,  mcg/min  norEPINEPHrine (LEVOPHED) 8 mg in  mL infusion (premix), 0.5-30 mcg/min, Last Rate: Stopped (06/13/24 1623)  DOBUTamine 500 mg in D5W 250 mL infusion (premix), 2.5-20 mcg/kg/min, Last Rate: 2.5 mcg/kg/min (06/13/24 1827)  EPINEPHrine (ADRENALIN) 4 mg in sodium chloride 0.9 % 250 mL infusion, 1-10 mcg/min  milrinone (PRIMACOR) 20 mg in D5W 100 mL infusion (premix), 0.0625-0.75 mcg/kg/min  vasopressin 40 Units in sodium chloride 0.9 % 100 mL infusion (POST CARDIAC SURGERY), 0.01-0.1 Units/min  clevidipine (CLEVIPREX) 50 mg in 100 mL infusion (premix), 1-21 mg/hr, Last Rate: 2 mg/hr (06/13/24 1827)  insulin regular 100 units in  mL infusion (non-OB/non-DKA premix), 0-28 Units/hr, Last Rate: 2 Units/hr (06/14/24 0201)          Volume Status:  Wt Readings from Last 9 Encounters:   06/13/24 99.2 kg (218 lb 11.2 oz)   06/12/24 98 kg (216 lb)   04/29/24 97.5 kg (215 lb)   04/12/24 97.1 kg (214 lb)   04/03/24 98.9 kg (218 lb)   03/18/24 98.5 kg (217 lb 2.5 oz)   03/14/24 98.4 kg (217 lb)   03/12/24 100.9 kg (222 lb 6.4 oz)   02/13/24 100.7 kg (222 lb)     I/O this shift:  In: 500 [Blood:500]  Out: 1345 [Urine:965; Emesis/NG output:150; Chest Tube:230]    Intake/Output Summary (Last 24 hours) at 6/14/2024 0244  Last data filed at 6/14/2024 0200  Gross per 24 hour   Intake 29957.64 ml   Output 5669 ml   Net 6796.64 ml       Inpatient Medications:  scopolamine, 1 patch, Left Ear, q72h  ceFAZolin, 2,000 mg, intravenous, q8h  acetaminophen, 1,000 mg, oral, q6h NILESH   Or  acetaminophen, 650 mg, oral, q6h NILESH  aspirin, 81 mg, oral, Daily  lansoprazole, 15 mg, oral, Daily at 1600  sennosides-docusate sodium, 1 tablet, oral, 2x daily  polyethylene glycol, 17 g, oral, Daily  levalbuterol (XOPENEX) nebulizer solution 1.25 mg orderable, 1.25 mg, nebulization, 3x daily  ipratropium, 0.5 mg, nebulization, 3x daily  enoxaparin,  40 mg, subcutaneous, q24h  clopidogreL, 75 mg, oral, Daily  ethyl alcohoL, 1 Application, nasal, 2x daily        LABS:  Lab Results   Component Value Date    WBC 5.5 06/13/2024    HGB 11.2 (L) 06/13/2024    HCT 31.2 (L) 06/13/2024    MCV 87.0 06/13/2024     06/13/2024     Lab Results   Component Value Date    GLUCOSE 147 (H) 06/13/2024    CALCIUM 9.5 06/13/2024    CALCIUM 9.5 06/13/2024     (H) 06/13/2024    K 3.6 06/13/2024    CO2 22 06/13/2024     (H) 06/13/2024    BUN 17 06/13/2024    CREATININE 0.94 06/13/2024    ANIONGAP 15 (H) 06/13/2024     Lab Results   Component Value Date    INR 1.0 06/13/2024    INR 1.0 06/13/2024    INR 0.9 06/12/2024    PT 11.7 06/13/2024    PT 12.0 06/13/2024    PT 10.6 06/12/2024     Lab Results   Component Value Date    APTT 33.7 06/13/2024       Imaging:    EKG:     STS Quality Indicators   YES NO IF NO, WHY?   ASA yes     Statin yes     Beta Blockade  No, bradycardia requiring pacing    ACE/ARB  No EF>40%        Assessment/Plan:    Left atrial myxoma s/p resection + Ascending aorta replacement  - Sinus bradycardia with low CI, paced now at 70bpm  - Dobutamine weaned off  - OOB to chair this am  - Leave swan and arterial line for monitoring  -Aggressive pulmonary toilet with IS/acapella  -Aggressive ambulation and mobility with PT/OT/CR    Fluid overload expected with intra operative volume resuscitation   - Lasix 40mg IVP at 2am, currently making >200cc per hour  - Q6 Electrolyte check  - Patient 7.5L positive in past 24 hours  - Low threshold to start lasix gtt    CAD  - Status post 2 vessel bypass yesterday  - ASA +Plavix  - Eventual bblocker once post operative hypotension resolves  - No ACE/ARB pts EF is greater than 40%    Hyperlipidemia  - High intensity statin     HTN  - Eventual bblockage, pt currently with borderline blood pressure for initiation    Stress Prophylaxis  - Continue prevacid    DVT prophylaxis  - Subq lovenox bid           This assessment  and plan was staffed with attending Cardiothoracic Surgeon Dr. Huynh. Please see any addendums/attestations as indicated.       Renny Pena PA-C   Cardiovascular Surgery   2:44 AM

## 2024-06-15 ENCOUNTER — APPOINTMENT (OUTPATIENT)
Dept: RADIOLOGY | Facility: HOSPITAL | Age: 64
DRG: 220 | End: 2024-06-15
Payer: MEDICARE

## 2024-06-15 LAB
ALBUMIN SERPL-MCNC: 4.3 G/DL (ref 3.5–5.3)
ANION GAP SERPL CALC-SCNC: 9 MMOL/L (ref 3–11)
BUN SERPL-MCNC: 22 MG/DL (ref 7–25)
BUN SERPL-MCNC: 23 MG/DL (ref 7–25)
BUN SERPL-MCNC: 24 MG/DL (ref 7–25)
CALCIUM ALBUM COR SERPL-MCNC: 9.4 MG/DL (ref 8.6–10.3)
CALCIUM SERPL-MCNC: 9.3 MG/DL (ref 8.6–10.3)
CALCIUM SERPL-MCNC: 9.6 MG/DL (ref 8.6–10.3)
CHLORIDE SERPL-SCNC: 101 MMOL/L (ref 98–107)
CHLORIDE SERPL-SCNC: 104 MMOL/L (ref 98–107)
CHLORIDE SERPL-SCNC: 104 MMOL/L (ref 98–107)
CO2 SERPL-SCNC: 32 MMOL/L (ref 21–32)
CO2 SERPL-SCNC: 33 MMOL/L (ref 21–32)
CO2 SERPL-SCNC: 33 MMOL/L (ref 21–32)
CREAT SERPL-MCNC: 1.26 MG/DL (ref 0.6–1.1)
CREAT SERPL-MCNC: 1.27 MG/DL (ref 0.6–1.1)
CREAT SERPL-MCNC: 1.29 MG/DL (ref 0.6–1.1)
EGFRCR SERPLBLD CKD-EPI 2021: 46 ML/MIN/1.73M*2
EGFRCR SERPLBLD CKD-EPI 2021: 47 ML/MIN/1.73M*2
EGFRCR SERPLBLD CKD-EPI 2021: 47 ML/MIN/1.73M*2
ERYTHROCYTE [DISTWIDTH] IN BLOOD BY AUTOMATED COUNT: 16.2 % (ref 11.5–14)
GLUCOSE BLDC GLUCOMTR-MCNC: 107 MG/DL (ref 70–105)
GLUCOSE BLDC GLUCOMTR-MCNC: 112 MG/DL (ref 70–105)
GLUCOSE BLDC GLUCOMTR-MCNC: 125 MG/DL (ref 70–105)
GLUCOSE BLDC GLUCOMTR-MCNC: 129 MG/DL (ref 70–105)
GLUCOSE SERPL-MCNC: 134 MG/DL (ref 70–105)
GLUCOSE SERPL-MCNC: 135 MG/DL (ref 70–105)
GLUCOSE SERPL-MCNC: 140 MG/DL (ref 70–105)
HCT VFR BLD AUTO: 33.4 % (ref 34–45)
HGB BLD-MCNC: 11.8 G/DL (ref 11.5–15.5)
MAGNESIUM SERPL-MCNC: 2.6 MG/DL (ref 1.8–2.4)
MCH RBC QN AUTO: 30.4 PG (ref 28–33)
MCHC RBC AUTO-ENTMCNC: 35.2 G/DL (ref 32–36)
MCV RBC AUTO: 86.4 FL (ref 81–97)
PLATELET # BLD AUTO: 183 10*3/UL (ref 140–350)
PMV BLD AUTO: 8.3 FL (ref 6.9–10.8)
POTASSIUM SERPL-SCNC: 3.3 MMOL/L (ref 3.5–5.1)
POTASSIUM SERPL-SCNC: 3.3 MMOL/L (ref 3.5–5.1)
POTASSIUM SERPL-SCNC: 3.5 MMOL/L (ref 3.5–5.1)
POTASSIUM SERPL-SCNC: 3.7 MMOL/L (ref 3.5–5.1)
POTASSIUM SERPL-SCNC: 3.7 MMOL/L (ref 3.5–5.1)
POTASSIUM SERPL-SCNC: 3.8 MMOL/L (ref 3.5–5.1)
POTASSIUM SERPL-SCNC: 3.8 MMOL/L (ref 3.5–5.1)
RBC # BLD AUTO: 3.87 10*6/ΜL (ref 3.7–5.3)
SODIUM SERPL-SCNC: 142 MMOL/L (ref 135–145)
SODIUM SERPL-SCNC: 146 MMOL/L (ref 135–145)
SODIUM SERPL-SCNC: 146 MMOL/L (ref 135–145)
WBC # BLD AUTO: 10.8 10*3/UL (ref 4.5–10.5)

## 2024-06-15 PROCEDURE — 85027 COMPLETE CBC AUTOMATED: CPT

## 2024-06-15 PROCEDURE — (BLANK) HC ROOM ICU INTERMEDIATE

## 2024-06-15 PROCEDURE — 82947 ASSAY GLUCOSE BLOOD QUANT: CPT | Mod: QW

## 2024-06-15 PROCEDURE — 94640 AIRWAY INHALATION TREATMENT: CPT

## 2024-06-15 PROCEDURE — 36415 COLL VENOUS BLD VENIPUNCTURE: CPT

## 2024-06-15 PROCEDURE — 83735 ASSAY OF MAGNESIUM: CPT

## 2024-06-15 PROCEDURE — 6370000100 HC RX 637 (ALT 250 FOR IP)

## 2024-06-15 PROCEDURE — 6360000200 HC RX 636 W HCPCS (ALT 250 FOR IP): Performed by: PHYSICIAN ASSISTANT

## 2024-06-15 PROCEDURE — (BLANK) HC RECOVERY PHASE-1 1ST  HOUR ACUITY LEVEL 3

## 2024-06-15 PROCEDURE — 6370000100 HC RX 637 (ALT 250 FOR IP): Performed by: PHYSICIAN ASSISTANT

## 2024-06-15 PROCEDURE — 99024 POSTOP FOLLOW-UP VISIT: CPT | Performed by: PHYSICIAN ASSISTANT

## 2024-06-15 PROCEDURE — 6360000200 HC RX 636 W HCPCS (ALT 250 FOR IP)

## 2024-06-15 PROCEDURE — 36600 WITHDRAWAL OF ARTERIAL BLOOD: CPT

## 2024-06-15 PROCEDURE — 2590000100 HC RX 259

## 2024-06-15 PROCEDURE — 84132 ASSAY OF SERUM POTASSIUM: CPT | Performed by: THORACIC SURGERY (CARDIOTHORACIC VASCULAR SURGERY)

## 2024-06-15 PROCEDURE — 99233 SBSQ HOSP IP/OBS HIGH 50: CPT | Performed by: INTERNAL MEDICINE

## 2024-06-15 PROCEDURE — 80048 BASIC METABOLIC PNL TOTAL CA: CPT

## 2024-06-15 PROCEDURE — 71045 X-RAY EXAM CHEST 1 VIEW: CPT

## 2024-06-15 PROCEDURE — 6360000200 HC RX 636 W HCPCS (ALT 250 FOR IP): Performed by: THORACIC SURGERY (CARDIOTHORACIC VASCULAR SURGERY)

## 2024-06-15 PROCEDURE — 2580000300 HC RX 258: Performed by: PHYSICIAN ASSISTANT

## 2024-06-15 RX ORDER — POTASSIUM CHLORIDE 29.8 MG/ML
20 INJECTION INTRAVENOUS ONCE
Status: COMPLETED | OUTPATIENT
Start: 2024-06-15 | End: 2024-06-15

## 2024-06-15 RX ORDER — FUROSEMIDE 10 MG/ML
40 INJECTION INTRAMUSCULAR; INTRAVENOUS
Status: DISCONTINUED | OUTPATIENT
Start: 2024-06-15 | End: 2024-06-16

## 2024-06-15 RX ORDER — POTASSIUM CHLORIDE 29.8 MG/ML
20 INJECTION INTRAVENOUS ONCE
Status: COMPLETED | OUTPATIENT
Start: 2024-06-15 | End: 2024-06-16

## 2024-06-15 RX ORDER — LIDOCAINE 50 MG/G
2 PATCH TOPICAL DAILY
Status: DISCONTINUED | OUTPATIENT
Start: 2024-06-15 | End: 2024-06-17 | Stop reason: HOSPADM

## 2024-06-15 RX ORDER — METOLAZONE 2.5 MG/1
10 TABLET ORAL DAILY
Status: DISCONTINUED | OUTPATIENT
Start: 2024-06-15 | End: 2024-06-17 | Stop reason: HOSPADM

## 2024-06-15 RX ORDER — POTASSIUM CHLORIDE 750 MG/1
20 TABLET, FILM COATED, EXTENDED RELEASE ORAL 2 TIMES DAILY WITH MEALS
Status: DISCONTINUED | OUTPATIENT
Start: 2024-06-15 | End: 2024-06-17 | Stop reason: HOSPADM

## 2024-06-15 RX ORDER — METOPROLOL TARTRATE 25 MG/1
12.5 TABLET, FILM COATED ORAL 2 TIMES DAILY
Status: DISCONTINUED | OUTPATIENT
Start: 2024-06-15 | End: 2024-06-17 | Stop reason: HOSPADM

## 2024-06-15 RX ORDER — LIDOCAINE 50 MG/G
2 PATCH TOPICAL DAILY
Status: DISCONTINUED | OUTPATIENT
Start: 2024-06-15 | End: 2024-06-15

## 2024-06-15 RX ADMIN — Medication 1000 MG: at 04:49

## 2024-06-15 RX ADMIN — POTASSIUM CHLORIDE 20 MEQ: 29.8 INJECTION, SOLUTION INTRAVENOUS at 16:13

## 2024-06-15 RX ADMIN — CLOPIDOGREL BISULFATE 75 MG: 75 TABLET ORAL at 08:31

## 2024-06-15 RX ADMIN — ONDANSETRON 4 MG: 2 INJECTION INTRAMUSCULAR; INTRAVENOUS at 21:15

## 2024-06-15 RX ADMIN — ETHYL ALCOHOL 1 APPLICATION: 62 SWAB TOPICAL at 08:32

## 2024-06-15 RX ADMIN — Medication 1000 MG: at 12:45

## 2024-06-15 RX ADMIN — TRAMADOL HYDROCHLORIDE 50 MG: 50 TABLET, FILM COATED ORAL at 11:13

## 2024-06-15 RX ADMIN — TRAMADOL HYDROCHLORIDE 50 MG: 50 TABLET, FILM COATED ORAL at 18:11

## 2024-06-15 RX ADMIN — POTASSIUM CHLORIDE 20 MEQ: 29.8 INJECTION, SOLUTION INTRAVENOUS at 22:53

## 2024-06-15 RX ADMIN — CEFAZOLIN 2000 MG: 2 INJECTION, POWDER, FOR SOLUTION INTRAMUSCULAR; INTRAVENOUS at 11:19

## 2024-06-15 RX ADMIN — CEFAZOLIN 2000 MG: 2 INJECTION, POWDER, FOR SOLUTION INTRAMUSCULAR; INTRAVENOUS at 18:15

## 2024-06-15 RX ADMIN — ROSUVASTATIN CALCIUM 20 MG: 20 TABLET, FILM COATED ORAL at 21:17

## 2024-06-15 RX ADMIN — FUROSEMIDE 40 MG: 10 INJECTION, SOLUTION INTRAMUSCULAR; INTRAVENOUS at 09:45

## 2024-06-15 RX ADMIN — Medication 1000 MG: at 23:54

## 2024-06-15 RX ADMIN — METOPROLOL TARTRATE 12.5 MG: 25 TABLET, FILM COATED ORAL at 11:13

## 2024-06-15 RX ADMIN — Medication 1000 MG: at 18:05

## 2024-06-15 RX ADMIN — MAGNESIUM HYDROXIDE 30 ML: 1200 LIQUID ORAL at 14:11

## 2024-06-15 RX ADMIN — POTASSIUM CHLORIDE 20 MEQ: 29.8 INJECTION, SOLUTION INTRAVENOUS at 00:07

## 2024-06-15 RX ADMIN — POTASSIUM CHLORIDE 20 MEQ: 29.8 INJECTION, SOLUTION INTRAVENOUS at 14:13

## 2024-06-15 RX ADMIN — POTASSIUM CHLORIDE 20 MEQ: 29.8 INJECTION, SOLUTION INTRAVENOUS at 17:59

## 2024-06-15 RX ADMIN — METOPROLOL TARTRATE 12.5 MG: 25 TABLET, FILM COATED ORAL at 21:17

## 2024-06-15 RX ADMIN — LANSOPRAZOLE 15 MG: 15 CAPSULE, DELAYED RELEASE ORAL at 18:05

## 2024-06-15 RX ADMIN — POLYETHYLENE GLYCOL 3350 17 G: 17 POWDER, FOR SOLUTION ORAL at 08:31

## 2024-06-15 RX ADMIN — LEVALBUTEROL HYDROCHLORIDE 1.25 MG: 1.25 SOLUTION RESPIRATORY (INHALATION) at 20:30

## 2024-06-15 RX ADMIN — OXYCODONE 10 MG: 5 TABLET ORAL at 01:04

## 2024-06-15 RX ADMIN — LEVALBUTEROL HYDROCHLORIDE 1.25 MG: 1.25 SOLUTION RESPIRATORY (INHALATION) at 07:54

## 2024-06-15 RX ADMIN — SENNOSIDES AND DOCUSATE SODIUM 1 TABLET: 50; 8.6 TABLET ORAL at 08:31

## 2024-06-15 RX ADMIN — OXYCODONE 10 MG: 5 TABLET ORAL at 21:17

## 2024-06-15 RX ADMIN — METOLAZONE 10 MG: 2.5 TABLET ORAL at 11:13

## 2024-06-15 RX ADMIN — FUROSEMIDE 40 MG: 10 INJECTION, SOLUTION INTRAMUSCULAR; INTRAVENOUS at 16:11

## 2024-06-15 RX ADMIN — ENOXAPARIN SODIUM 40 MG: 100 INJECTION SUBCUTANEOUS at 08:31

## 2024-06-15 RX ADMIN — LIDOCAINE 2 PATCH: 700 PATCH TOPICAL at 01:04

## 2024-06-15 RX ADMIN — SODIUM CHLORIDE 30 ML: 9 INJECTION, SOLUTION INTRAVENOUS at 22:52

## 2024-06-15 RX ADMIN — IPRATROPIUM BROMIDE 0.5 MG: 0.5 SOLUTION RESPIRATORY (INHALATION) at 20:30

## 2024-06-15 RX ADMIN — ETHYL ALCOHOL 1 APPLICATION: 62 SWAB TOPICAL at 21:00

## 2024-06-15 RX ADMIN — POTASSIUM CHLORIDE 20 MEQ: 29.8 INJECTION, SOLUTION INTRAVENOUS at 08:37

## 2024-06-15 RX ADMIN — TRAMADOL HYDROCHLORIDE 50 MG: 50 TABLET, FILM COATED ORAL at 04:49

## 2024-06-15 RX ADMIN — SENNOSIDES AND DOCUSATE SODIUM 1 TABLET: 50; 8.6 TABLET ORAL at 21:17

## 2024-06-15 RX ADMIN — POTASSIUM CHLORIDE 20 MEQ: 750 TABLET, FILM COATED, EXTENDED RELEASE ORAL at 12:10

## 2024-06-15 RX ADMIN — NAPROXEN SODIUM 81 MG: 220 TABLET, FILM COATED ORAL at 08:31

## 2024-06-15 RX ADMIN — OXYCODONE 5 MG: 5 TABLET ORAL at 14:10

## 2024-06-15 RX ADMIN — OXYCODONE 5 MG: 5 TABLET ORAL at 08:31

## 2024-06-15 RX ADMIN — IPRATROPIUM BROMIDE 0.5 MG: 0.5 SOLUTION RESPIRATORY (INHALATION) at 07:54

## 2024-06-15 RX ADMIN — POTASSIUM CHLORIDE 20 MEQ: 750 TABLET, FILM COATED, EXTENDED RELEASE ORAL at 18:05

## 2024-06-15 RX ADMIN — POTASSIUM CHLORIDE 20 MEQ: 29.8 INJECTION, SOLUTION INTRAVENOUS at 23:59

## 2024-06-15 RX ADMIN — POTASSIUM CHLORIDE 20 MEQ: 29.8 INJECTION, SOLUTION INTRAVENOUS at 03:24

## 2024-06-15 NOTE — PLAN OF CARE
Problem: Knowledge Deficit  Goal: Patient/family/caregiver demonstrates understanding of disease process, treatment plan, medications, and discharge instructions  Description: INTERVENTIONS:   1. Complete learning assessment and assess knowledge base  2. Provide teaching at level of understanding   3. Provide teaching via preferred learning methods  Outcome: Progressing     Problem: Potential for Compromised Skin Integrity  Goal: Skin Integrity is Maintained or Improved  Description: INTERVENTIONS:  1. Assess and monitor skin integrity  2. Collaborate with interdisciplinary team and initiate plans and interventions as needed  3. Alternate a full bath with partial baths for elderly   4. Monitor patient's hygiene practices   5. Collaborate with wound, ostomy, and continence nurse  Outcome: Progressing  Goal: Nutritional status is improving  Description: INTERVENTIONS:  1. Monitor and assess patient for malnutrition (ex- brittle hair, bruises, dry skin, pale skin and conjunctiva, muscle wasting, smooth red tongue, and disorientation)  2. Monitor patient's weight and dietary intake as ordered or per policy  3. Determine patient's food preferences and provide high-protein, high-caloric foods as appropriate  4. Assist patient with eating   5. Allow adequate time for meals   6. Encourage patient to take dietary supplement as ordered   7. Collaborate with dietitian  8. Include patient/family/caregiver in decisions related to nutrition  Outcome: Progressing  Goal: MOBILITY IS MAINTAINED OR IMPROVED  Description: INTERVENTIONS  1. Collaborate with interdisciplinary team and initiate plan and interventions as ordered (PT/OT)  2. Encourage ambulation  3. Up to chair for meals  4. Monitor for signs of deconditioning  Outcome: Progressing     Problem: Urinary Incontinence  Goal: Perineal skin integrity is maintained or improved  Description: INTERVENTIONS:  1. Assess genitourinary system, perineal skin, labs (urinalysis), and  history of incontinence to include past management, aggravating, and alleviating factors  2. Collaborate with interdisciplinary team including wound, ostomy, and continence nurse and initiate plans and interventions as needed  4. Consider urine containment device  5. Apply skin protectant   6. Develop skin care regimen  7. Provide privacy when changing patient's incontinence device to maintain their dignity  Outcome: Progressing     Problem: Safety Adult - Fall  Goal: Free from fall injury  Description: INTERVENTIONS:    Inpatient - Please reference Cares/Safety Flowsheet under [place] Fall Risk for interventions.  Pediatrics - Please reference Peds Daily Cares/Safety Flowsheet under [place] Pediatric Fall Assessment Fall Bundle for interventions  LD/OB - Please reference OB Shift Screening Flowsheet under OB Fall Risk for interventions.  Outcome: Progressing

## 2024-06-15 NOTE — PROGRESS NOTES
CARDIOTHORACIC & VASCULAR SURGERY PROGRESS NOTE    PROCEDURE PERFORMED:Median sternotomy with resection of complex myxoma left atrial  Primary reconstruction of left atrial dome  Bovine patch interatrial septoplasty  CABG x 2 (LIMA to LAD, SVG to OM1)  Right endoscopic greater saphenous vein harvest  Ascending aorta resection with replacement with 28 mm /10 mm Hemashield graft  Deep hypothermic circulatory arrest  Retrograde cerebral perfusion via SVC  Anterior sternal plating with Katie [employer] plates  Application of Prevena wound VAC  DATE: 6/13/24  SURGEON: DR. Huynh    Hospital Course:    6/14 POD #1: Extubated to 3L NC, Lower CI overnight requiring pacing at 80bpm, also vagal episode with bradycardia to 30s resolved with pacing. 7.5L positive. Diurese started overnight. Likely start lasix gtt. Dobutamine off paced at 70bpm    6/15 POD #2: Stable overnight. Continues on lasix gtt at 30mg/hr, -4.5L in 24 hours. PACs in bigem overnight amio bolus x1 with resolution. Needs extensive PT and motivation to complete IS/acapella. Transfer to Stepdown     ROS: Pertinent positives noted above. Patient denies SOB, chest pain, palpitations and all other pertinent 12 point ROS are essentially negative.     HPI:Ms. Acevedo is a 64-year-old female with PMHx nonalcoholic fatty liver disease, thyroid nodules, hiatal hernia, chronic bronchitis, HTN, HLD, ascending aortic aneurysm (4.5 cm), bicuspid aortic valve, and intra-septal mass who presents for imaging follow-up with Dr. Huynh.  Referred from general cardiology Dr. Pulliam.       Patient was having some increased fatigue in January.  3/14/24 KENDRICK demonstrated a new 1.4 x 1.6 cm well-circumscribed mass attached to the intra-atrial septum near the fossa ovalis.  Bicuspid aortic valve continues to only have mild stenosis with trace regurgitation with aortic root measuring 4.06 cm.  Follow-up carotid with mild, not hemodynamically significant stenosis in bilateral carotids,  "CTA imaging demonstrated mild atherosclerotic calcifications and 4.5 cm ascending thoracic aortic aneurysm with no evidence of dissection.  Aneurysm is 3.96 cm in 2021, then in July 2023 was 4.2 cm. Left heart cath demonstrated 70% LAD ostia stenosis and mid LAD 80% stenosis along with mid RCA 30% stenosis. Thoracic surgical history of left breast biopsies but no large surgeries or radiation therapy.     Patient is active around the house with gardening and yard work.  Non-smoker.  Occasional alcohol use.  No illicit drug use.  Will occasionally get dizzy/lightheaded and increased dyspnea with walking uphill.  However no edema, orthopnea, PND, or chest pain.  Patient does note that she does not want a mechanical valve as she was not want to be on warfarin.       Patient has returned today to discuss results of coronary angiogram.  Also had long discussion with [facility] blood supply and found the process to be exhausted and unlikely to have enough donors for what is necessary.  Patient and family decided to accept generic donor blood to speed up process.  Continues to deny chest pain, edema, or new palpitations         OBJECTIVE:    Vital Signs (Last 24 hours)  /57   Pulse 58   Temp 37.3 °C (99.1 °F)   Resp 11   Ht 1.676 m (5' 6\")   Wt 105.2 kg (231 lb 13 oz)   SpO2 94%   BMI 37.42 kg/m²     PHYSICAL EXAM:  GENERAL: Well nourished, no acute distress  NEURO: Alert and Oriented x3, no focal deficits, moves all extremities   HEENT: PERRLA, normocephalic, atraumatic,   CARDIAC: RRR, no murmur, gallop, or rub appreciated   LUNGS: Clear to auscultation bilaterally, unlabored breathing   ABDOMEN: Soft  NTND, + BS in all 4 quadrants   SKIN: No obvious, rashes, edema, or lesions   PERIPHERAL VASCULAR: + dopplerable pulses bilaterally   PSYCH: Normal mood and affect  TUBES/LINES/WIRE: peripheral IV's   INCISIONS: Clean, dry, intact: PWV to sternum with good suction, no drainage. SVG is healing well without " infectious signs.     Chest Tube Output Last 12/24hrs:   50/240      Cardiovascular/Inotropic Gtts:  furosemide (LASIX) 400 mg in sodium chloride 0.9 % 100 mL infusion, 30 mg/hr, Last Rate: 30 mg/hr (06/14/24 2233)  dexmedeTOMIDine (PRECEDEX) 400 mcg in  mL infusion (premix), 0.2-1.5 mcg/kg/hr, Last Rate: Stopped (06/13/24 1810)  phenylephrine (ANGEL-SYNEPHRINE) 40 mg in sodium chloride 0.9 % 250 mL infusion,  mcg/min  norEPINEPHrine (LEVOPHED) 8 mg in  mL infusion (premix), 0.5-30 mcg/min, Last Rate: Stopped (06/14/24 0609)  DOBUTamine 500 mg in D5W 250 mL infusion (premix), 2.5-20 mcg/kg/min, Last Rate: Stopped (06/14/24 0235)  EPINEPHrine (ADRENALIN) 4 mg in sodium chloride 0.9 % 250 mL infusion, 1-10 mcg/min  milrinone (PRIMACOR) 20 mg in D5W 100 mL infusion (premix), 0.0625-0.75 mcg/kg/min  vasopressin 40 Units in sodium chloride 0.9 % 100 mL infusion (POST CARDIAC SURGERY), 0.01-0.1 Units/min  insulin regular 100 units in  mL infusion (non-OB/non-DKA premix), 0-28 Units/hr, Last Rate: Stopped (06/14/24 1607)          Volume Status:  Wt Readings from Last 9 Encounters:   06/14/24 105.2 kg (231 lb 13 oz)   06/12/24 98 kg (216 lb)   04/29/24 97.5 kg (215 lb)   04/12/24 97.1 kg (214 lb)   04/03/24 98.9 kg (218 lb)   03/18/24 98.5 kg (217 lb 2.5 oz)   03/14/24 98.4 kg (217 lb)   03/12/24 100.9 kg (222 lb 6.4 oz)   02/13/24 100.7 kg (222 lb)     I/O this shift:  In: 300.2 [P.O.:100; I.V.:50.4; IV Piggyback:149.7]  Out: 1825 [Urine:1775; Chest Tube:50]    Intake/Output Summary (Last 24 hours) at 6/15/2024 0513  Last data filed at 6/15/2024 0400  Gross per 24 hour   Intake 987.41 ml   Output 5990 ml   Net -5002.59 ml       Inpatient Medications:  lidocaine, 2 patch, Topical, Daily  scopolamine, 1 patch, Left Ear, q72h  rosuvastatin, 20 mg, oral, Nightly  insulin short-acting 100 unit/mL SQ injection (correction dose), 0-15 Units, subcutaneous, Insulin: 4x daily with meals  amLODIPine, 2.5 mg,  oral, Daily  acetaminophen, 1,000 mg, oral, q6h NILESH   Or  acetaminophen, 650 mg, oral, q6h NILESH  aspirin, 81 mg, oral, Daily  lansoprazole, 15 mg, oral, Daily at 1600  sennosides-docusate sodium, 1 tablet, oral, 2x daily  polyethylene glycol, 17 g, oral, Daily  levalbuterol (XOPENEX) nebulizer solution 1.25 mg orderable, 1.25 mg, nebulization, 3x daily  ipratropium, 0.5 mg, nebulization, 3x daily  enoxaparin, 40 mg, subcutaneous, q24h  clopidogreL, 75 mg, oral, Daily  ethyl alcohoL, 1 Application, nasal, 2x daily        LABS:  Lab Results   Component Value Date    WBC 10.8 (H) 06/15/2024    HGB 11.8 06/15/2024    HCT 33.4 (L) 06/15/2024    MCV 86.4 06/15/2024     06/15/2024     Lab Results   Component Value Date    GLUCOSE 140 (H) 06/15/2024    CALCIUM 9.6 06/15/2024    CALCIUM 9.6 06/15/2024     (H) 06/15/2024    K 3.8 06/15/2024    K 3.8 06/15/2024    CO2 33 (H) 06/15/2024     06/15/2024    BUN 22 06/15/2024    CREATININE 1.26 (H) 06/15/2024    ANIONGAP 9 06/15/2024     Lab Results   Component Value Date    INR 1.0 06/13/2024    INR 1.0 06/13/2024    INR 0.9 06/12/2024    PT 11.7 06/13/2024    PT 12.0 06/13/2024    PT 10.6 06/12/2024     Lab Results   Component Value Date    APTT 33.7 06/13/2024       Imaging:    EKG:     STS Quality Indicators   YES NO IF NO, WHY?   ASA yes     Statin yes     Beta Blockade  No, bradycardia requiring pacing    ACE/ARB  No EF>40%        Assessment/Plan:    Left atrial myxoma s/p resection + Ascending aorta replacement  - Sinus bradycardia continues but stable  - Dobutamine weaned off  - OOB to chair this am  - [place] removed  -Aggressive pulmonary toilet with IS/acapella  -Aggressive ambulation and mobility with PT/OT/CR    Fluid overload expected with intra operative volume resuscitation   - Lasix gtt at 30mg per hour  - Q6 Electrolyte check  - Patient 4.5L positive in past 24 hours    CAD  - Status post 2 vessel bypass yesterday  - ASA +Plavix  - Eventual bblocker  once post operative hypotension resolves  - No ACE/ARB pts EF is greater than 40%    Hyperlipidemia  - High intensity statin     HTN  - Eventual bblockage, pt currently with borderline blood pressure for initiation    Stress Prophylaxis  - Continue prevacid    DVT prophylaxis  - Subq lovenox bid           This assessment and plan was staffed with attending Cardiothoracic Surgeon Dr. Huynh. Please see any addendums/attestations as indicated.       Renny Pena PA-C   Cardiovascular Surgery   5:13 AM

## 2024-06-15 NOTE — PROGRESS NOTES
CRITICAL CARE PROGRESS NOTE    PRESENTING COMPLAINT: 64-year-old female status post pump assisted CABG x 2, resection of complex left atrial myxoma with primary reconstruction of left atrial dome, bovine patch intra arterial septoplasty with ascending aortic replacement with deep hypothermic circulatory arrest and retrograde cerebral perfusion.    LOS: Day 2.    SUBJECTIVE: Events of last night noted, patient seen and examined.  Awake, alert, answering questions appropriately.  Sitting up in chair.  Working on pain control.  Has not ambulated yet today.  Discussed with CV surgery service.  Transitioning from amlodipine to labetalol today.  Working with incentive spirometer, flutter valve.    PLAN: Ambulate is much as possible.  Will keep in ICU to monitor blood pressures while transitioning blood pressure medications.  After that we will discontinue arterial line if remains stable.  Transitioning off furosemide infusion, changing to IV furosemide with metolazone.  Greater 100,000 colonies of E. coli grown on urinary culture on 6/12/2024.  Discussed with CV surgery who will evaluate.    ASSESSMENT:     Status post CABG x 2, left atrial myxoma resection, ascending aortic reconstruction  Postoperative respiratory insufficiency  Acute blood loss anemia  Hyponatremia  Hypokalemia  Coronary artery disease  Bicuspid aortic valve with mild stenosis/trace regurgitation  Primary hypertension  Hyperlipidemia  Chronic bronchitis  Metabolic dysfunction with associated steatohepatitis      Ventilator: N/A       Antibiotics: None    Fluids: I/O last 3 completed shifts:  In: 3639.6 [P.O.:700; I.V.:1127.5; Blood:500; IV Piggyback:1312.1]  Out: 8165 [Urine:7370; Emesis/NG output:150; Chest Tube:645]  I/O: +3116 mL since admission    Nutrition:        Dietary Orders   (From admission, onward)                 Start     Ordered    06/14/24 0719  Diet Clear Liquid; Fluid Restricted (total/d); 1800 ml  Diet effective now        Question  Answer Comment   Nutrition Therapy Protocol (Dietitian May Adjust Diet and Nourishments) No    Diet type Clear Liquid    Diet Additional Fluid Restricted (total/d)    Fluid Restriction: 1800 ml        24 0770                  Nutritionist consulted- appreciate assistance.    Malnutrition Assessment:  Type: Protein calorie          Evaluation:            Code Status/Goals of Care: Full Code      Principal Problem:    CAD (coronary artery disease)  Active Problems:    Acute respiratory insufficiency, postoperative       Objective       VITAL SIGNS:  Temp:  [37.2 °C (99 °F)-37.9 °C (100.2 °F)] 37.2 °C (99 °F)  Heart Rate:  [49-66] 65  Resp:  [9-37] 12  SpO2:  [92 %-98 %] 93 %  ABP: ()/(37-60) 115/47  SpO2/FiO2 Ratio Using Approximate FiO2 (%):  [328.6-387.5] 387.5  Estimated P/F Ratio Using Approximate FiO2 (%):  [284.4-332.2] 332.2       Physical Exam  Vitals and nursing note reviewed.   Constitutional:       Appearance: She is normal weight.      Interventions: Nasal cannula in place.   HENT:      Head: Normocephalic.   Eyes:      Pupils: Pupils are equal, round, and reactive to light.   Cardiovascular:      Rate and Rhythm: Normal rate and regular rhythm.      Heart sounds: Murmur heard.      Systolic murmur is present with a grade of 2/6.   Pulmonary:      Breath sounds: Examination of the right-lower field reveals decreased breath sounds. Examination of the left-lower field reveals decreased breath sounds. Decreased breath sounds present.   Abdominal:      General: Bowel sounds are decreased.      Palpations: Abdomen is soft.      Tenderness: There is no abdominal tenderness.   Musculoskeletal:      Right lower le+ Pitting Edema present.      Left lower le+ Pitting Edema present.   Skin:     General: Skin is warm.      Capillary Refill: Capillary refill takes less than 2 seconds.   Neurological:      Mental Status: She is alert.   Psychiatric:         Behavior: Behavior is cooperative.          Incision 06/13/24 Leg (Active)       Incision 06/13/24 Chest Midsternal- PrevenaChest tube sites- drain sponge/ tegaderm (Active)                   I have reviewed all the lab and microbiology results.    I have reviewed all imaging.    I have reviewed medications.      Critical Care Time:     35 minutes critical care time spent in the evaluation and management of patient's current medical condition with > 50% of time via face to face with patient or on patient's assigned floor  spent in the counseling and coordination of patient's care which included the following Counseling: Diagnosis(es)/Current medical problem(s), Disease specific management/plan and education and Medication regimen and Coordination of care: Discussion with consultant(s), Discussion with pharmacy, Discussion with PT/OT, Discussion nutrition, Discussion with RT, Discussion with case management and RN.    A voice recognition program was used to aid in documentation of this record. Sometimes words are not printed exactly as they were spoken.  While efforts were made to carefully edit and correct any inaccuracies, some errors may be present; please take these into context.  Please contact provider if you need assistance interpreting any part of this medical record or notice any mistakes.

## 2024-06-16 ENCOUNTER — APPOINTMENT (OUTPATIENT)
Dept: RADIOLOGY | Facility: HOSPITAL | Age: 64
DRG: 220 | End: 2024-06-16
Payer: MEDICARE

## 2024-06-16 LAB
ALBUMIN SERPL-MCNC: 4.8 G/DL (ref 3.5–5.3)
ANION GAP SERPL CALC-SCNC: 10 MMOL/L (ref 3–11)
ANION GAP SERPL CALC-SCNC: 10 MMOL/L (ref 3–11)
ANION GAP SERPL CALC-SCNC: 7 MMOL/L (ref 3–11)
ANION GAP SERPL CALC-SCNC: 8 MMOL/L (ref 3–11)
BUN SERPL-MCNC: 27 MG/DL (ref 7–25)
BUN SERPL-MCNC: 30 MG/DL (ref 7–25)
BUN SERPL-MCNC: 31 MG/DL (ref 7–25)
BUN SERPL-MCNC: 32 MG/DL (ref 7–25)
CALCIUM ALBUM COR SERPL-MCNC: 9.8 MG/DL (ref 8.6–10.3)
CALCIUM SERPL-MCNC: 10 MG/DL (ref 8.6–10.3)
CALCIUM SERPL-MCNC: 10 MG/DL (ref 8.6–10.3)
CALCIUM SERPL-MCNC: 10.4 MG/DL (ref 8.6–10.3)
CALCIUM SERPL-MCNC: 10.4 MG/DL (ref 8.6–10.3)
CALCIUM SERPL-MCNC: 9.9 MG/DL (ref 8.6–10.3)
CHLORIDE SERPL-SCNC: 96 MMOL/L (ref 98–107)
CHLORIDE SERPL-SCNC: 98 MMOL/L (ref 98–107)
CHLORIDE SERPL-SCNC: 98 MMOL/L (ref 98–107)
CHLORIDE SERPL-SCNC: 99 MMOL/L (ref 98–107)
CO2 SERPL-SCNC: 30 MMOL/L (ref 21–32)
CO2 SERPL-SCNC: 31 MMOL/L (ref 21–32)
CO2 SERPL-SCNC: 32 MMOL/L (ref 21–32)
CO2 SERPL-SCNC: 32 MMOL/L (ref 21–32)
CREAT SERPL-MCNC: 1.13 MG/DL (ref 0.6–1.1)
CREAT SERPL-MCNC: 1.16 MG/DL (ref 0.6–1.1)
CREAT SERPL-MCNC: 1.2 MG/DL (ref 0.6–1.1)
CREAT SERPL-MCNC: 1.23 MG/DL (ref 0.6–1.1)
EGFRCR SERPLBLD CKD-EPI 2021: 49 ML/MIN/1.73M*2
EGFRCR SERPLBLD CKD-EPI 2021: 50 ML/MIN/1.73M*2
EGFRCR SERPLBLD CKD-EPI 2021: 52 ML/MIN/1.73M*2
EGFRCR SERPLBLD CKD-EPI 2021: 54 ML/MIN/1.73M*2
ERYTHROCYTE [DISTWIDTH] IN BLOOD BY AUTOMATED COUNT: 15.9 % (ref 11.5–14)
GLUCOSE BLDC GLUCOMTR-MCNC: 111 MG/DL (ref 70–105)
GLUCOSE BLDC GLUCOMTR-MCNC: 112 MG/DL (ref 70–105)
GLUCOSE BLDC GLUCOMTR-MCNC: 125 MG/DL (ref 70–105)
GLUCOSE BLDC GLUCOMTR-MCNC: 145 MG/DL (ref 70–105)
GLUCOSE BLDC GLUCOMTR-MCNC: 85 MG/DL (ref 70–105)
GLUCOSE BLDC GLUCOMTR-MCNC: 93 MG/DL (ref 70–105)
GLUCOSE SERPL-MCNC: 105 MG/DL (ref 70–105)
GLUCOSE SERPL-MCNC: 112 MG/DL (ref 70–105)
GLUCOSE SERPL-MCNC: 118 MG/DL (ref 70–105)
GLUCOSE SERPL-MCNC: 157 MG/DL (ref 70–105)
HCT VFR BLD AUTO: 37.1 % (ref 34–45)
HGB BLD-MCNC: 12.6 G/DL (ref 11.5–15.5)
MAGNESIUM SERPL-MCNC: 2.5 MG/DL (ref 1.8–2.4)
MCH RBC QN AUTO: 30.1 PG (ref 28–33)
MCHC RBC AUTO-ENTMCNC: 34 G/DL (ref 32–36)
MCV RBC AUTO: 88.5 FL (ref 81–97)
PLATELET # BLD AUTO: 196 10*3/UL (ref 140–350)
PMV BLD AUTO: 8.8 FL (ref 6.9–10.8)
POTASSIUM SERPL-SCNC: 3.3 MMOL/L (ref 3.5–5.1)
POTASSIUM SERPL-SCNC: 3.8 MMOL/L (ref 3.5–5.1)
POTASSIUM SERPL-SCNC: 4.1 MMOL/L (ref 3.5–5.1)
POTASSIUM SERPL-SCNC: 4.1 MMOL/L (ref 3.5–5.1)
RBC # BLD AUTO: 4.19 10*6/ΜL (ref 3.7–5.3)
SODIUM SERPL-SCNC: 136 MMOL/L (ref 135–145)
SODIUM SERPL-SCNC: 137 MMOL/L (ref 135–145)
SODIUM SERPL-SCNC: 138 MMOL/L (ref 135–145)
SODIUM SERPL-SCNC: 140 MMOL/L (ref 135–145)
WBC # BLD AUTO: 9.3 10*3/UL (ref 4.5–10.5)

## 2024-06-16 PROCEDURE — 71045 X-RAY EXAM CHEST 1 VIEW: CPT

## 2024-06-16 PROCEDURE — 82947 ASSAY GLUCOSE BLOOD QUANT: CPT | Mod: QW

## 2024-06-16 PROCEDURE — 85027 COMPLETE CBC AUTOMATED: CPT

## 2024-06-16 PROCEDURE — 2590000100 HC RX 259

## 2024-06-16 PROCEDURE — 6370000100 HC RX 637 (ALT 250 FOR IP)

## 2024-06-16 PROCEDURE — 36415 COLL VENOUS BLD VENIPUNCTURE: CPT

## 2024-06-16 PROCEDURE — 2580000300 HC RX 258: Performed by: PHYSICIAN ASSISTANT

## 2024-06-16 PROCEDURE — 80048 BASIC METABOLIC PNL TOTAL CA: CPT

## 2024-06-16 PROCEDURE — (BLANK) HC ROOM ICU INTERMEDIATE

## 2024-06-16 PROCEDURE — 94799 UNLISTED PULMONARY SVC/PX: CPT

## 2024-06-16 PROCEDURE — 6360000200 HC RX 636 W HCPCS (ALT 250 FOR IP)

## 2024-06-16 PROCEDURE — 6360000200 HC RX 636 W HCPCS (ALT 250 FOR IP): Performed by: PHYSICIAN ASSISTANT

## 2024-06-16 PROCEDURE — 83735 ASSAY OF MAGNESIUM: CPT

## 2024-06-16 PROCEDURE — 6370000100 HC RX 637 (ALT 250 FOR IP): Performed by: PHYSICIAN ASSISTANT

## 2024-06-16 PROCEDURE — 94640 AIRWAY INHALATION TREATMENT: CPT

## 2024-06-16 RX ORDER — POTASSIUM CHLORIDE 7.45 MG/ML
10 INJECTION INTRAVENOUS ONCE
Status: DISCONTINUED | OUTPATIENT
Start: 2024-06-16 | End: 2024-06-16

## 2024-06-16 RX ORDER — POTASSIUM CHLORIDE 750 MG/1
40 TABLET, FILM COATED, EXTENDED RELEASE ORAL ONCE
Status: COMPLETED | OUTPATIENT
Start: 2024-06-16 | End: 2024-06-16

## 2024-06-16 RX ORDER — POTASSIUM CHLORIDE 750 MG/1
20 TABLET, FILM COATED, EXTENDED RELEASE ORAL ONCE
Status: COMPLETED | OUTPATIENT
Start: 2024-06-16 | End: 2024-06-16

## 2024-06-16 RX ORDER — POTASSIUM CHLORIDE 750 MG/1
20 TABLET, FILM COATED, EXTENDED RELEASE ORAL ONCE
Status: DISCONTINUED | OUTPATIENT
Start: 2024-06-16 | End: 2024-06-17 | Stop reason: HOSPADM

## 2024-06-16 RX ORDER — CYCLOBENZAPRINE HCL 10 MG
10 TABLET ORAL 3 TIMES DAILY PRN
Status: DISCONTINUED | OUTPATIENT
Start: 2024-06-16 | End: 2024-06-16

## 2024-06-16 RX ORDER — CARISOPRODOL 350 MG/1
350 TABLET ORAL 3 TIMES DAILY PRN
Status: DISCONTINUED | OUTPATIENT
Start: 2024-06-16 | End: 2024-06-17 | Stop reason: HOSPADM

## 2024-06-16 RX ORDER — FUROSEMIDE 40 MG/1
40 TABLET ORAL
Status: DISCONTINUED | OUTPATIENT
Start: 2024-06-16 | End: 2024-06-17 | Stop reason: HOSPADM

## 2024-06-16 RX ADMIN — IPRATROPIUM BROMIDE 0.5 MG: 0.5 SOLUTION RESPIRATORY (INHALATION) at 08:04

## 2024-06-16 RX ADMIN — CEFAZOLIN 2000 MG: 2 INJECTION, POWDER, FOR SOLUTION INTRAMUSCULAR; INTRAVENOUS at 19:56

## 2024-06-16 RX ADMIN — SODIUM CHLORIDE 30 ML: 9 INJECTION, SOLUTION INTRAVENOUS at 03:25

## 2024-06-16 RX ADMIN — CARISOPRODOL 350 MG: 350 TABLET ORAL at 04:36

## 2024-06-16 RX ADMIN — FUROSEMIDE 40 MG: 40 TABLET ORAL at 15:33

## 2024-06-16 RX ADMIN — ONDANSETRON 4 MG: 2 INJECTION INTRAMUSCULAR; INTRAVENOUS at 20:53

## 2024-06-16 RX ADMIN — TRAMADOL HYDROCHLORIDE 50 MG: 50 TABLET, FILM COATED ORAL at 19:59

## 2024-06-16 RX ADMIN — SENNOSIDES AND DOCUSATE SODIUM 1 TABLET: 50; 8.6 TABLET ORAL at 20:00

## 2024-06-16 RX ADMIN — POTASSIUM CHLORIDE 40 MEQ: 750 TABLET, FILM COATED, EXTENDED RELEASE ORAL at 13:03

## 2024-06-16 RX ADMIN — Medication 1000 MG: at 19:58

## 2024-06-16 RX ADMIN — Medication 1000 MG: at 12:24

## 2024-06-16 RX ADMIN — ETHYL ALCOHOL 1 APPLICATION: 62 SWAB TOPICAL at 12:25

## 2024-06-16 RX ADMIN — CEFAZOLIN 2000 MG: 2 INJECTION, POWDER, FOR SOLUTION INTRAMUSCULAR; INTRAVENOUS at 03:27

## 2024-06-16 RX ADMIN — ETHYL ALCOHOL 1 APPLICATION: 62 SWAB TOPICAL at 19:59

## 2024-06-16 RX ADMIN — SENNOSIDES AND DOCUSATE SODIUM 1 TABLET: 50; 8.6 TABLET ORAL at 08:50

## 2024-06-16 RX ADMIN — LEVALBUTEROL HYDROCHLORIDE 1.25 MG: 1.25 SOLUTION RESPIRATORY (INHALATION) at 08:04

## 2024-06-16 RX ADMIN — METOPROLOL TARTRATE 12.5 MG: 25 TABLET, FILM COATED ORAL at 08:50

## 2024-06-16 RX ADMIN — METOLAZONE 10 MG: 2.5 TABLET ORAL at 08:50

## 2024-06-16 RX ADMIN — POLYETHYLENE GLYCOL 3350 17 G: 17 POWDER, FOR SOLUTION ORAL at 08:49

## 2024-06-16 RX ADMIN — POTASSIUM CHLORIDE 40 MEQ: 750 TABLET, FILM COATED, EXTENDED RELEASE ORAL at 19:59

## 2024-06-16 RX ADMIN — LIDOCAINE 2 PATCH: 700 PATCH TOPICAL at 08:47

## 2024-06-16 RX ADMIN — METOPROLOL TARTRATE 12.5 MG: 25 TABLET, FILM COATED ORAL at 20:00

## 2024-06-16 RX ADMIN — SODIUM CHLORIDE 50 ML: 9 INJECTION, SOLUTION INTRAVENOUS at 19:55

## 2024-06-16 RX ADMIN — CEFAZOLIN 2000 MG: 2 INJECTION, POWDER, FOR SOLUTION INTRAMUSCULAR; INTRAVENOUS at 12:31

## 2024-06-16 RX ADMIN — Medication 1000 MG: at 06:15

## 2024-06-16 RX ADMIN — CLOPIDOGREL BISULFATE 75 MG: 75 TABLET ORAL at 08:49

## 2024-06-16 RX ADMIN — ROSUVASTATIN CALCIUM 20 MG: 20 TABLET, FILM COATED ORAL at 20:00

## 2024-06-16 RX ADMIN — POTASSIUM CHLORIDE 20 MEQ: 750 TABLET, FILM COATED, EXTENDED RELEASE ORAL at 18:00

## 2024-06-16 RX ADMIN — NAPROXEN SODIUM 81 MG: 220 TABLET, FILM COATED ORAL at 08:49

## 2024-06-16 RX ADMIN — ENOXAPARIN SODIUM 40 MG: 100 INJECTION SUBCUTANEOUS at 06:16

## 2024-06-16 RX ADMIN — POTASSIUM CHLORIDE 20 MEQ: 750 TABLET, FILM COATED, EXTENDED RELEASE ORAL at 15:33

## 2024-06-16 RX ADMIN — LANSOPRAZOLE 15 MG: 15 CAPSULE, DELAYED RELEASE ORAL at 15:33

## 2024-06-16 RX ADMIN — POTASSIUM CHLORIDE 20 MEQ: 750 TABLET, FILM COATED, EXTENDED RELEASE ORAL at 08:51

## 2024-06-16 RX ADMIN — FUROSEMIDE 40 MG: 40 TABLET ORAL at 08:50

## 2024-06-16 ASSESSMENT — ACTIVITIES OF DAILY LIVING (ADL)
ASSISTIVE_DEVICES: EYEGLASSES
ADEQUATE_TO_COMPLETE_ADL: YES

## 2024-06-16 NOTE — PROGRESS NOTES
CARDIOTHORACIC & VASCULAR SURGERY PROGRESS NOTE    PROCEDURE PERFORMED:Median sternotomy with resection of complex myxoma left atrial  Primary reconstruction of left atrial dome  Bovine patch interatrial septoplasty  CABG x 2 (LIMA to LAD, SVG to OM1)  Right endoscopic greater saphenous vein harvest  Ascending aorta resection with replacement with 28 mm /10 mm Hemashield graft  Deep hypothermic circulatory arrest  Retrograde cerebral perfusion via SVC  Anterior sternal plating with Katie [employer] plates  Application of Prevena wound VAC  DATE: 6/13/24  SURGEON: DR. Huynh    Hospital Course:    6/14 POD #1: Extubated to 3L NC, Lower CI overnight requiring pacing at 80bpm, also vagal episode with bradycardia to 30s resolved with pacing. 7.5L positive. Diurese started overnight. Likely start lasix gtt. Dobutamine off paced at 70bpm    6/15 POD #2: Stable overnight. Continues on lasix gtt at 30mg/hr, -4.5L in 24 hours. PACs in bigem overnight amio bolus x1 with resolution. Needs extensive PT and motivation to complete IS/acapella. Transfer to Stepdown     6/16 POD #3: Transferred to HVU overnight. Started soma for some muscle cramping. Diuresing well. Continue to wean off supplemental o2. CT with 182ml in 24 hours. Potential removal this am      ROS: Pertinent positives noted above. Patient denies SOB, chest pain, palpitations and all other pertinent 12 point ROS are essentially negative.     HPI:Ms. Acevedo is a 64-year-old female with PMHx nonalcoholic fatty liver disease, thyroid nodules, hiatal hernia, chronic bronchitis, HTN, HLD, ascending aortic aneurysm (4.5 cm), bicuspid aortic valve, and intra-septal mass who presents for imaging follow-up with Dr. Huynh.  Referred from general cardiology Dr. Pulliam.       Patient was having some increased fatigue in January.  3/14/24 KENDRICK demonstrated a new 1.4 x 1.6 cm well-circumscribed mass attached to the intra-atrial septum near the fossa ovalis.  Bicuspid aortic valve  "continues to only have mild stenosis with trace regurgitation with aortic root measuring 4.06 cm.  Follow-up carotid with mild, not hemodynamically significant stenosis in bilateral carotids, CTA imaging demonstrated mild atherosclerotic calcifications and 4.5 cm ascending thoracic aortic aneurysm with no evidence of dissection.  Aneurysm is 3.96 cm in 2021, then in July 2023 was 4.2 cm. Left heart cath demonstrated 70% LAD ostia stenosis and mid LAD 80% stenosis along with mid RCA 30% stenosis. Thoracic surgical history of left breast biopsies but no large surgeries or radiation therapy.     Patient is active around the house with gardening and yard work.  Non-smoker.  Occasional alcohol use.  No illicit drug use.  Will occasionally get dizzy/lightheaded and increased dyspnea with walking uphill.  However no edema, orthopnea, PND, or chest pain.  Patient does note that she does not want a mechanical valve as she was not want to be on warfarin.       Patient has returned today to discuss results of coronary angiogram.  Also had long discussion with [facility] blood supply and found the process to be exhausted and unlikely to have enough donors for what is necessary.  Patient and family decided to accept generic donor blood to speed up process.  Continues to deny chest pain, edema, or new palpitations         OBJECTIVE:    Vital Signs (Last 24 hours)  /67 (BP Location: Right arm, Patient Position: In bed, Cuff Size: Regular Adult)   Pulse 58   Temp 36.7 °C (98.1 °F) (Oral)   Resp 16   Ht 1.676 m (5' 6\")   Wt 98 kg (216 lb)   SpO2 91%   BMI 34.86 kg/m²     PHYSICAL EXAM:  GENERAL: Well nourished, no acute distress  NEURO: Alert and Oriented x3, no focal deficits, moves all extremities   HEENT: PERRLA, normocephalic, atraumatic,   CARDIAC: RRR, no murmur, gallop, or rub appreciated   LUNGS: Clear to auscultation bilaterally, unlabored breathing   ABDOMEN: Soft  NTND, + BS in all 4 quadrants   SKIN: No " obvious, rashes, edema, or lesions   PERIPHERAL VASCULAR: + dopplerable pulses bilaterally   PSYCH: Normal mood and affect  TUBES/LINES/WIRE: peripheral IV's   INCISIONS: Clean, dry, intact: PWV to sternum with good suction, no drainage. SVG is healing well without infectious signs.     Chest Tube Output Last 12/24hrs:   182ml/24hours        Cardiovascular/Inotropic Gtts:  dexmedeTOMIDine (PRECEDEX) 400 mcg in  mL infusion (premix), 0.2-1.5 mcg/kg/hr, Last Rate: Stopped (06/13/24 1810)  phenylephrine (ANGEL-SYNEPHRINE) 40 mg in sodium chloride 0.9 % 250 mL infusion,  mcg/min  norEPINEPHrine (LEVOPHED) 8 mg in  mL infusion (premix), 0.5-30 mcg/min, Last Rate: Stopped (06/14/24 0609)  DOBUTamine 500 mg in D5W 250 mL infusion (premix), 2.5-20 mcg/kg/min, Last Rate: Stopped (06/14/24 0235)  EPINEPHrine (ADRENALIN) 4 mg in sodium chloride 0.9 % 250 mL infusion, 1-10 mcg/min  milrinone (PRIMACOR) 20 mg in D5W 100 mL infusion (premix), 0.0625-0.75 mcg/kg/min  vasopressin 40 Units in sodium chloride 0.9 % 100 mL infusion (POST CARDIAC SURGERY), 0.01-0.1 Units/min  insulin regular 100 units in  mL infusion (non-OB/non-DKA premix), 0-28 Units/hr, Last Rate: Stopped (06/14/24 1607)          Volume Status:  Wt Readings from Last 9 Encounters:   06/16/24 98 kg (216 lb)   06/12/24 98 kg (216 lb)   04/29/24 97.5 kg (215 lb)   04/12/24 97.1 kg (214 lb)   04/03/24 98.9 kg (218 lb)   03/18/24 98.5 kg (217 lb 2.5 oz)   03/14/24 98.4 kg (217 lb)   03/12/24 100.9 kg (222 lb 6.4 oz)   02/13/24 100.7 kg (222 lb)     I/O this shift:  In: 520.7 [P.O.:250; I.V.:105.4; IV Piggyback:165.3]  Out: 837 [Urine:795; Chest Tube:42]    Intake/Output Summary (Last 24 hours) at 6/16/2024 0552  Last data filed at 6/16/2024 0547  Gross per 24 hour   Intake 1950.77 ml   Output 3972 ml   Net -2021.23 ml       Inpatient Medications:  lidocaine, 2 patch, Topical, Daily  metOLazone, 10 mg, oral, Daily  furosemide, 40 mg, intravenous, 2x  daily diuretic  metoprolol tartrate, 12.5 mg, oral, 2x daily  ceFAZolin, 2,000 mg, intravenous, q8h  potassium chloride, 20 mEq, oral, 2x daily with meals  scopolamine, 1 patch, Left Ear, q72h  rosuvastatin, 20 mg, oral, Nightly  insulin short-acting 100 unit/mL SQ injection (correction dose), 0-15 Units, subcutaneous, Insulin: 4x daily with meals  acetaminophen, 1,000 mg, oral, q6h NILESH   Or  acetaminophen, 650 mg, oral, q6h NILESH  aspirin, 81 mg, oral, Daily  lansoprazole, 15 mg, oral, Daily at 1600  sennosides-docusate sodium, 1 tablet, oral, 2x daily  polyethylene glycol, 17 g, oral, Daily  levalbuterol (XOPENEX) nebulizer solution 1.25 mg orderable, 1.25 mg, nebulization, 3x daily  ipratropium, 0.5 mg, nebulization, 3x daily  enoxaparin, 40 mg, subcutaneous, q24h  clopidogreL, 75 mg, oral, Daily  ethyl alcohoL, 1 Application, nasal, 2x daily        LABS:  Lab Results   Component Value Date    WBC 10.8 (H) 06/15/2024    HGB 11.8 06/15/2024    HCT 33.4 (L) 06/15/2024    MCV 86.4 06/15/2024     06/15/2024     Lab Results   Component Value Date    GLUCOSE 118 (H) 06/16/2024    CALCIUM 10.0 06/16/2024     06/16/2024    K 4.1 06/16/2024    K 4.1 06/16/2024    CO2 32 06/16/2024    CL 99 06/16/2024    BUN 27 (H) 06/16/2024    CREATININE 1.23 (H) 06/16/2024    ANIONGAP 7 06/16/2024     Lab Results   Component Value Date    INR 1.0 06/13/2024    INR 1.0 06/13/2024    INR 0.9 06/12/2024    PT 11.7 06/13/2024    PT 12.0 06/13/2024    PT 10.6 06/12/2024     Lab Results   Component Value Date    APTT 33.7 06/13/2024       Imaging:    EKG:     STS Quality Indicators   YES NO IF NO, WHY?   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  - Sinus bradycardia continues but stable  - Started on low dose lopressor  - OOB to chair this am  - [place] removed  -Aggressive pulmonary toilet with IS/acapella  -Aggressive  ambulation and mobility with PT/OT/CR    Fluid overload expected with intra operative volume resuscitation   - Lasix gtt pod#1-->now lasix 40mg bid  - Q6 Electrolyte check  - Patient 4.5L positive in past 24 hours    CAD  - Status post 2 vessel bypass   - ASA +Plavix  - Tolerating bblocker  - No ACE/ARB pts EF is greater than 40%    Hyperlipidemia  - High intensity statin     HTN  - Eventual bblockage, pt currently with borderline blood pressure for initiation    Stress Prophylaxis  - Continue prevacid    DVT prophylaxis  - Subq lovenox bid           This assessment and plan was staffed with attending Cardiothoracic Surgeon Dr. Huynh. Please see any addendums/attestations as indicated.       Renny Pena PA-C   Cardiovascular Surgery   5:52 AM

## 2024-06-16 NOTE — PLAN OF CARE
Problem: Knowledge Deficit  Goal: Patient/family/caregiver demonstrates understanding of disease process, treatment plan, medications, and discharge instructions  Description: INTERVENTIONS:   1. Complete learning assessment and assess knowledge base  2. Provide teaching at level of understanding   3. Provide teaching via preferred learning methods  Outcome: Progressing  Flowsheets (Taken 6/16/2024 1454)  Patient/family/caregiver demonstrates understanding of disease process, treatment plan, medications, and discharge instructions:   Complete learning assessment and assess knowledge base   Provide teaching via preferred learning methods   Provide teaching at level of understanding     Problem: Potential for Compromised Skin Integrity  Goal: Skin Integrity is Maintained or Improved  Description: INTERVENTIONS:  1. Assess and monitor skin integrity  2. Collaborate with interdisciplinary team and initiate plans and interventions as needed  3. Alternate a full bath with partial baths for elderly   4. Monitor patient's hygiene practices   5. Collaborate with wound, ostomy, and continence nurse  Outcome: Progressing  Flowsheets (Taken 6/16/2024 1454)  Skin integrity is maintained or improved:   Assess and monitor skin integrity   Alternate a full bath with partial baths for elderly   Collaborate with wound, ostomy, and continence nurse   Collaborate with interdisciplinary team and initiate plans and interventions as needed  Goal: Nutritional status is improving  Description: INTERVENTIONS:  1. Monitor and assess patient for malnutrition (ex- brittle hair, bruises, dry skin, pale skin and conjunctiva, muscle wasting, smooth red tongue, and disorientation)  2. Monitor patient's weight and dietary intake as ordered or per policy  3. Determine patient's food preferences and provide high-protein, high-caloric foods as appropriate  4. Assist patient with eating   5. Allow adequate time for meals   6. Encourage patient to take  dietary supplement as ordered   7. Collaborate with dietitian  8. Include patient/family/caregiver in decisions related to nutrition  Outcome: Progressing  Flowsheets (Taken 6/16/2024 1454)  Nutritional status is improving:   Monitor and assess patient for malnutrition (ex- brittle hair, bruises, dry skin, pale skin and conjunctiva, muscle wasting, smooth red tongue, and disorientation)   Monitor patient's weight and dietary intake as ordered or per policy   Determine patient's food preferences and provide high-protein, high-caloric foods as appropriate   Assist patient with eating   Allow adequate time for meals  Goal: MOBILITY IS MAINTAINED OR IMPROVED  Description: INTERVENTIONS  1. Collaborate with interdisciplinary team and initiate plan and interventions as ordered (PT/OT)  2. Encourage ambulation  3. Up to chair for meals  4. Monitor for signs of deconditioning  Outcome: Progressing  Flowsheets (Taken 6/16/2024 1454)  Mobility is Maintained or Improved:   Collaborate with interdisciplinary team and initiate plan and interventions as ordered (PT/OT)   Encourage ambulation   Up to chair for meals     Problem: Urinary Incontinence  Goal: Perineal skin integrity is maintained or improved  Description: INTERVENTIONS:  1. Assess genitourinary system, perineal skin, labs (urinalysis), and history of incontinence to include past management, aggravating, and alleviating factors  2. Collaborate with interdisciplinary team including wound, ostomy, and continence nurse and initiate plans and interventions as needed  4. Consider urine containment device  5. Apply skin protectant   6. Develop skin care regimen  7. Provide privacy when changing patient's incontinence device to maintain their dignity  Outcome: Progressing  Flowsheets (Taken 6/16/2024 1454)  Perineal skin integrity is maintained or improved:   Assess genitourinary system, perineal skin, labs (urinalysis), and history of incontinence to include past  management, aggravating, and alleviating factors   Collaborate with interdisciplinary team including wound, ostomy, and continence nurse and initiate plans and interventions as needed   Consider urine containment device   Apply skin protectant     Problem: Safety Adult - Fall  Goal: Free from fall injury  Description: INTERVENTIONS:    Inpatient - Please reference Cares/Safety Flowsheet under [place] Fall Risk for interventions.  Pediatrics - Please reference Peds Daily Cares/Safety Flowsheet under [place] Pediatric Fall Assessment Fall Bundle for interventions  LD/OB - Please reference OB Shift Screening Flowsheet under OB Fall Risk for interventions.  Outcome: Progressing

## 2024-06-16 NOTE — PLAN OF CARE
Problem: Knowledge Deficit  Goal: Patient/family/caregiver demonstrates understanding of disease process, treatment plan, medications, and discharge instructions  Description: INTERVENTIONS:   1. Complete learning assessment and assess knowledge base  2. Provide teaching at level of understanding   3. Provide teaching via preferred learning methods  Outcome: Progressing     Problem: Potential for Compromised Skin Integrity  Goal: Skin Integrity is Maintained or Improved  Description: INTERVENTIONS:  1. Assess and monitor skin integrity  2. Collaborate with interdisciplinary team and initiate plans and interventions as needed  3. Alternate a full bath with partial baths for elderly   4. Monitor patient's hygiene practices   5. Collaborate with wound, ostomy, and continence nurse  Outcome: Progressing  Goal: Nutritional status is improving  Description: INTERVENTIONS:  1. Monitor and assess patient for malnutrition (ex- brittle hair, bruises, dry skin, pale skin and conjunctiva, muscle wasting, smooth red tongue, and disorientation)  2. Monitor patient's weight and dietary intake as ordered or per policy  3. Determine patient's food preferences and provide high-protein, high-caloric foods as appropriate  4. Assist patient with eating   5. Allow adequate time for meals   6. Encourage patient to take dietary supplement as ordered   7. Collaborate with dietitian  8. Include patient/family/caregiver in decisions related to nutrition  Outcome: Progressing  Goal: MOBILITY IS MAINTAINED OR IMPROVED  Description: INTERVENTIONS  1. Collaborate with interdisciplinary team and initiate plan and interventions as ordered (PT/OT)  2. Encourage ambulation  3. Up to chair for meals  4. Monitor for signs of deconditioning  Outcome: Progressing     Problem: Urinary Incontinence  Goal: Perineal skin integrity is maintained or improved  Description: INTERVENTIONS:  1. Assess genitourinary system, perineal skin, labs (urinalysis), and  history of incontinence to include past management, aggravating, and alleviating factors  2. Collaborate with interdisciplinary team including wound, ostomy, and continence nurse and initiate plans and interventions as needed  4. Consider urine containment device  5. Apply skin protectant   6. Develop skin care regimen  7. Provide privacy when changing patient's incontinence device to maintain their dignity  Outcome: Progressing     Problem: Safety Adult - Fall  Goal: Free from fall injury  Description: INTERVENTIONS:    Inpatient - Please reference Cares/Safety Flowsheet under [place] Fall Risk for interventions.  Pediatrics - Please reference Peds Daily Cares/Safety Flowsheet under [place] Pediatric Fall Assessment Fall Bundle for interventions  LD/OB - Please reference OB Shift Screening Flowsheet under OB Fall Risk for interventions.  Outcome: Progressing

## 2024-06-16 NOTE — NURSING END OF SHIFT
Nursing End of Shift Summary:    Patient: Jamir Acevedo  MRN: [id number]  : 1960, Age: 64 y.o.    Location: [place] [address]    Nursing Goals  Clinical Goals for the Shift: Monitor cardiac, resp, neuro, and gu status. Promote and maintain safety and comfort. Monior UOP Q1 hr    Narrative Summary of Progress Toward Clinical Goals:  18 ml out the pleural CT and 30 ml out the mediastinal CT. Zak helped with her shoulder pain. Ambulated & up in chair this morning.    Barriers to Goals/Nursing Concerns:  No    New Patient or Family Concerns/Issues:  No    Shift Summary:   Significant Events & Communications to Providers (Last 12 Hours)       Last 5 Values       Row Name 06/15/24 1515                   Provider Notification    Reason for Communication Pain  PRN pain meds aren't helping with pain  -RW        Provider Name Jonny PAYNE                  User Key  (r) = Recorded By, (t) = Taken By, (c) = Cosigned By      Initials Name    Jr Ho, ALY                  Oxygen Usage (Last 12 Hours)       Last 5 Values       Row Name 06/15/24 2300                   Room Air or Baseline Oxygen Trial by Nursing    Is Patient on Room Air OR on the Same Amount of O2 as at Home? No        Are You Performing the QShift O2 Trial? No                      Mobility (Last 12 Hours)       Last 5 Values       Row Name 06/15/24 1600 06/15/24 1900 06/15/24 2100 06/15/24 2233 24 0013       Mobility    Activity Ambulate in dawkins -- -- Stand at bedside;Dangle --    Level of Assistance Standby assist, set-up cues, supervision of patient - no hands on -- -- -- --    Patient Position -- Up in chair In bed In bed In bed    Turning/Repositioning Shifting weight in chair Shifting weight in chair -- Turns self --                  Urethral Catheter       Active Urethral Catheter       Name Placement date Placement time Site Days    Urethral Catheter Latex;Straight-tip;Temperature probe 16 Fr. 24  0718  -- 2                   Active Lines       Active Central venous catheter / Peripherally inserted central catheter / Implantable Port / Hemodialysis catheter / Midline Catheter       Name Placement date Placement time Site Days    Introducer  06/13/24 Right Internal jugular 06/13/24 0725  created via procedure documentation  Internal jugular  2    Midline Peripheral 06/13/24 Left Basilic 06/13/24 0625  created via procedure documentation  Basilic  2                  Infusing Medications   Medication Dose Last Rate    dexmedeTOMIDine  0.2-1.5 mcg/kg/hr Stopped (06/13/24 1810)    phenylephrine (ANGEL-SYNEPHRINE) infusion orderable   mcg/min      norEPINEPHrine  0.5-30 mcg/min Stopped (06/14/24 0609)    DOButamine  2.5-20 mcg/kg/min Stopped (06/14/24 0235)    EPINEPHine infusion orderable  1-10 mcg/min      milrinone  0.0625-0.75 mcg/kg/min      vasopressin 40 Units in sodium chloride 0.9 % 100 mL infusion (POST CARDIAC SURGERY)  0.01-0.1 Units/min      insulin regular 100 units in 100 mL infusion orderable (Non-DKA/Non-OB) calculator  0-28 Units/hr Stopped (06/14/24 1607)     PRN Medications   Medication Dose Last Admin    promethazine  25 mg 25 mg at 06/14/24 0200    traMADol  50 mg 50 mg at 06/15/24 1811    oxyCODONE  5-10 mg 10 mg at 06/15/24 2117    hydrALAZINE  5-10 mg      calcium gluconate  2 g      magnesium sulfate  4 g      magnesium sulfate  2 g      ondansetron  4 mg 4 mg at 06/15/24 2115    alum-mag hydroxide-simeth  30 mL      magnesium hydroxide  30 mL 30 mL at 06/15/24 1411    Or    magnesium hydroxide  30 mL      Or    magnesium hydroxide  30 mL      bisacodyL  10 mg      levalbuterol (XOPENEX) nebulizer solution 1.25 mg orderable  1.25 mg      ipratropium  0.5 mg      artificial tears  1-2 drop 2 drop at 06/14/24 0200    dextrose 50 % in water (D50W)  15-30 mL      dextrose  15.2 g      glucagon  1 mg      Or    glucagon  1 mg      sodium chloride 0.9% (NS)  25-50 mL Stopped at 06/14/24 1057    sodium chloride  0.9% (NS)  25-50 mL Stopped at 06/16/24 0156    metoclopramide (REGLAN) IV orderable  5 mg 5 mg at 06/14/24 1950

## 2024-06-17 ENCOUNTER — APPOINTMENT (OUTPATIENT)
Dept: CARDIAC REHAB | Facility: HOSPITAL | Age: 64
DRG: 220 | End: 2024-06-17
Payer: MEDICARE

## 2024-06-17 ENCOUNTER — APPOINTMENT (OUTPATIENT)
Dept: RADIOLOGY | Facility: HOSPITAL | Age: 64
DRG: 220 | End: 2024-06-17
Payer: MEDICARE

## 2024-06-17 VITALS
WEIGHT: 210 LBS | HEIGHT: 66 IN | DIASTOLIC BLOOD PRESSURE: 70 MMHG | RESPIRATION RATE: 18 BRPM | SYSTOLIC BLOOD PRESSURE: 109 MMHG | TEMPERATURE: 97.8 F | OXYGEN SATURATION: 92 % | BODY MASS INDEX: 33.75 KG/M2 | HEART RATE: 73 BPM

## 2024-06-17 DIAGNOSIS — Z95.1 S/P 2-VESSEL CORONARY ARTERY BYPASS: Primary | ICD-10-CM

## 2024-06-17 LAB
ALBUMIN SERPL-MCNC: 4.1 G/DL (ref 3.5–5.3)
ANION GAP SERPL CALC-SCNC: 9 MMOL/L (ref 3–11)
ANION GAP SERPL CALC-SCNC: 9 MMOL/L (ref 3–11)
BUN SERPL-MCNC: 34 MG/DL (ref 7–25)
BUN SERPL-MCNC: 37 MG/DL (ref 7–25)
CALCIUM ALBUM COR SERPL-MCNC: 10 MG/DL (ref 8.6–10.3)
CALCIUM SERPL-MCNC: 10.1 MG/DL (ref 8.6–10.3)
CALCIUM SERPL-MCNC: 10.1 MG/DL (ref 8.6–10.3)
CALCIUM SERPL-MCNC: 10.3 MG/DL (ref 8.6–10.3)
CHLORIDE SERPL-SCNC: 100 MMOL/L (ref 98–107)
CHLORIDE SERPL-SCNC: 101 MMOL/L (ref 98–107)
CO2 SERPL-SCNC: 28 MMOL/L (ref 21–32)
CO2 SERPL-SCNC: 28 MMOL/L (ref 21–32)
CREAT SERPL-MCNC: 1.03 MG/DL (ref 0.6–1.1)
CREAT SERPL-MCNC: 1.04 MG/DL (ref 0.6–1.1)
EGFRCR SERPLBLD CKD-EPI 2021: 60 ML/MIN/1.73M*2
EGFRCR SERPLBLD CKD-EPI 2021: 60 ML/MIN/1.73M*2
ERYTHROCYTE [DISTWIDTH] IN BLOOD BY AUTOMATED COUNT: 15.1 % (ref 11.5–14)
GLUCOSE SERPL-MCNC: 119 MG/DL (ref 70–105)
GLUCOSE SERPL-MCNC: 128 MG/DL (ref 70–105)
HCT VFR BLD AUTO: 35.8 % (ref 34–45)
HGB BLD-MCNC: 12.7 G/DL (ref 11.5–15.5)
MAGNESIUM SERPL-MCNC: 2.2 MG/DL (ref 1.8–2.4)
MCH RBC QN AUTO: 31 PG (ref 28–33)
MCHC RBC AUTO-ENTMCNC: 35.4 G/DL (ref 32–36)
MCV RBC AUTO: 87.6 FL (ref 81–97)
PLATELET # BLD AUTO: 209 10*3/UL (ref 140–350)
PMV BLD AUTO: 8.5 FL (ref 6.9–10.8)
POTASSIUM SERPL-SCNC: 3.7 MMOL/L (ref 3.5–5.1)
POTASSIUM SERPL-SCNC: 3.7 MMOL/L (ref 3.5–5.1)
POTASSIUM SERPL-SCNC: 3.8 MMOL/L (ref 3.5–5.1)
POTASSIUM SERPL-SCNC: 3.8 MMOL/L (ref 3.5–5.1)
RBC # BLD AUTO: 4.08 10*6/ΜL (ref 3.7–5.3)
SODIUM SERPL-SCNC: 137 MMOL/L (ref 135–145)
SODIUM SERPL-SCNC: 138 MMOL/L (ref 135–145)
WBC # BLD AUTO: 8.9 10*3/UL (ref 4.5–10.5)

## 2024-06-17 PROCEDURE — 71045 X-RAY EXAM CHEST 1 VIEW: CPT

## 2024-06-17 PROCEDURE — 80048 BASIC METABOLIC PNL TOTAL CA: CPT

## 2024-06-17 PROCEDURE — 36430 TRANSFUSION BLD/BLD COMPNT: CPT

## 2024-06-17 PROCEDURE — 98960 EDU&TRN PT SELF-MGMT NQHP 1: CPT

## 2024-06-17 PROCEDURE — 6370000100 HC RX 637 (ALT 250 FOR IP): Performed by: PHYSICIAN ASSISTANT

## 2024-06-17 PROCEDURE — 6370000100 HC RX 637 (ALT 250 FOR IP)

## 2024-06-17 PROCEDURE — 6360000200 HC RX 636 W HCPCS (ALT 250 FOR IP)

## 2024-06-17 PROCEDURE — 83735 ASSAY OF MAGNESIUM: CPT

## 2024-06-17 PROCEDURE — 85027 COMPLETE CBC AUTOMATED: CPT

## 2024-06-17 PROCEDURE — 93798 PHYS/QHP OP CAR RHAB W/ECG: CPT

## 2024-06-17 PROCEDURE — 36415 COLL VENOUS BLD VENIPUNCTURE: CPT

## 2024-06-17 PROCEDURE — 6370000100 HC RX 637 (ALT 250 FOR IP): Performed by: THORACIC SURGERY (CARDIOTHORACIC VASCULAR SURGERY)

## 2024-06-17 PROCEDURE — 2580000300 HC RX 258: Performed by: PHYSICIAN ASSISTANT

## 2024-06-17 PROCEDURE — 6360000200 HC RX 636 W HCPCS (ALT 250 FOR IP): Performed by: PHYSICIAN ASSISTANT

## 2024-06-17 RX ORDER — FUROSEMIDE 40 MG/1
40 TABLET ORAL 2 TIMES DAILY
Qty: 10 TABLET | Refills: 0 | Status: SHIPPED | OUTPATIENT
Start: 2024-06-17 | End: 2024-06-24 | Stop reason: ALTCHOICE

## 2024-06-17 RX ORDER — POTASSIUM CHLORIDE 750 MG/1
20 TABLET, FILM COATED, EXTENDED RELEASE ORAL ONCE
Status: COMPLETED | OUTPATIENT
Start: 2024-06-17 | End: 2024-06-17

## 2024-06-17 RX ORDER — ROSUVASTATIN CALCIUM 20 MG/1
20 TABLET, COATED ORAL NIGHTLY
Qty: 30 TABLET | Refills: 3 | Status: SHIPPED | OUTPATIENT
Start: 2024-06-17 | End: 2024-10-15

## 2024-06-17 RX ORDER — CALC/MAG/B COMPLEX/D3/HERB 61
15 TABLET ORAL DAILY
Qty: 90 CAPSULE | Refills: 0 | Status: SHIPPED | OUTPATIENT
Start: 2024-06-17 | End: 2024-09-15

## 2024-06-17 RX ORDER — POTASSIUM CHLORIDE 1500 MG/1
20 TABLET, EXTENDED RELEASE ORAL 2 TIMES DAILY
Qty: 10 TABLET | Refills: 0 | Status: SHIPPED | OUTPATIENT
Start: 2024-06-17 | End: 2024-06-20 | Stop reason: SDUPTHER

## 2024-06-17 RX ORDER — CLOPIDOGREL BISULFATE 75 MG/1
75 TABLET ORAL DAILY
Qty: 90 TABLET | Refills: 0 | Status: SHIPPED | OUTPATIENT
Start: 2024-06-18 | End: 2024-09-16

## 2024-06-17 RX ORDER — TRAMADOL HYDROCHLORIDE 50 MG/1
50 TABLET ORAL EVERY 8 HOURS PRN
Qty: 9 TABLET | Refills: 0 | Status: SHIPPED | OUTPATIENT
Start: 2024-06-17 | End: 2024-06-20

## 2024-06-17 RX ORDER — METOPROLOL TARTRATE 25 MG/1
12.5 TABLET, FILM COATED ORAL 2 TIMES DAILY
Qty: 90 TABLET | Refills: 3 | Status: SHIPPED | OUTPATIENT
Start: 2024-06-17 | End: 2025-06-17

## 2024-06-17 RX ADMIN — CEFAZOLIN 2000 MG: 2 INJECTION, POWDER, FOR SOLUTION INTRAMUSCULAR; INTRAVENOUS at 11:30

## 2024-06-17 RX ADMIN — POTASSIUM BICARBONATE 20 MEQ: 782 TABLET, EFFERVESCENT ORAL at 03:24

## 2024-06-17 RX ADMIN — POTASSIUM CHLORIDE 20 MEQ: 750 TABLET, FILM COATED, EXTENDED RELEASE ORAL at 07:30

## 2024-06-17 RX ADMIN — Medication 1000 MG: at 05:13

## 2024-06-17 RX ADMIN — SCOPALAMINE 1 PATCH: 1 PATCH, EXTENDED RELEASE TRANSDERMAL at 07:38

## 2024-06-17 RX ADMIN — METOLAZONE 10 MG: 2.5 TABLET ORAL at 07:33

## 2024-06-17 RX ADMIN — CEFAZOLIN 2000 MG: 2 INJECTION, POWDER, FOR SOLUTION INTRAMUSCULAR; INTRAVENOUS at 03:26

## 2024-06-17 RX ADMIN — ENOXAPARIN SODIUM 40 MG: 100 INJECTION SUBCUTANEOUS at 07:33

## 2024-06-17 RX ADMIN — SODIUM CHLORIDE 50 ML: 9 INJECTION, SOLUTION INTRAVENOUS at 03:26

## 2024-06-17 RX ADMIN — SODIUM CHLORIDE 25 ML: 9 INJECTION, SOLUTION INTRAVENOUS at 11:29

## 2024-06-17 RX ADMIN — LIDOCAINE 2 PATCH: 700 PATCH TOPICAL at 07:38

## 2024-06-17 RX ADMIN — METOPROLOL TARTRATE 12.5 MG: 25 TABLET, FILM COATED ORAL at 07:37

## 2024-06-17 RX ADMIN — CLOPIDOGREL BISULFATE 75 MG: 75 TABLET ORAL at 07:33

## 2024-06-17 RX ADMIN — Medication 1000 MG: at 00:08

## 2024-06-17 RX ADMIN — NAPROXEN SODIUM 81 MG: 220 TABLET, FILM COATED ORAL at 07:33

## 2024-06-17 RX ADMIN — Medication 1000 MG: at 12:08

## 2024-06-17 RX ADMIN — FUROSEMIDE 40 MG: 40 TABLET ORAL at 07:37

## 2024-06-17 RX ADMIN — ETHYL ALCOHOL 1 APPLICATION: 62 SWAB TOPICAL at 07:37

## 2024-06-17 NOTE — PROGRESS NOTES
CARDIOTHORACIC & VASCULAR SURGERY PROGRESS NOTE    PROCEDURE PERFORMED:Median sternotomy with resection of complex myxoma left atrial  Primary reconstruction of left atrial dome  Bovine patch interatrial septoplasty  CABG x 2 (LIMA to LAD, SVG to OM1)  Right endoscopic greater saphenous vein harvest  Ascending aorta resection with replacement with 28 mm /10 mm Hemashield graft  Deep hypothermic circulatory arrest  Retrograde cerebral perfusion via SVC  Anterior sternal plating with [employer] plates  Application of Prevena wound VAC  DATE: 6/13/24  SURGEON: DR. Huynh    Hospital Course:    6/14 POD #1: Extubated to 3L NC, Lower CI overnight requiring pacing at 80bpm, also vagal episode with bradycardia to 30s resolved with pacing. 7.5L positive. Diurese started overnight. Likely start lasix gtt. Dobutamine off paced at 70bpm    6/15 POD #2: Stable overnight. Continues on lasix gtt at 30mg/hr, -4.5L in 24 hours. PACs in bigem overnight amio bolus x1 with resolution. Needs extensive PT and motivation to complete IS/acapella. Transfer to Stepdown     6/16 POD #3: Transferred to HVU overnight. Started soma for some muscle cramping. Diuresing well. Continue to wean off supplemental o2. CT with 182ml in 24 hours. Potential removal this am    6/17 POD #4: Doing very well this morning. Hopeful to go home. CT wiggins and pacing wires were Discontinued yesterday. DC home today    ROS: Pertinent positives noted above. Patient denies SOB, chest pain, palpitations and all other pertinent 12 point ROS are essentially negative.     HPI:Ms. Acevedo is a 64-year-old female with PMHx nonalcoholic fatty liver disease, thyroid nodules, hiatal hernia, chronic bronchitis, HTN, HLD, ascending aortic aneurysm (4.5 cm), bicuspid aortic valve, and intra-septal mass who presents for imaging follow-up with Dr. Huynh.  Referred from general cardiology Dr. Pulliam.       Patient was having some increased fatigue in January.  3/14/24 KENDRICK  "demonstrated a new 1.4 x 1.6 cm well-circumscribed mass attached to the intra-atrial septum near the fossa ovalis.  Bicuspid aortic valve continues to only have mild stenosis with trace regurgitation with aortic root measuring 4.06 cm.  Follow-up carotid with mild, not hemodynamically significant stenosis in bilateral carotids, CTA imaging demonstrated mild atherosclerotic calcifications and 4.5 cm ascending thoracic aortic aneurysm with no evidence of dissection.  Aneurysm is 3.96 cm in 2021, then in July 2023 was 4.2 cm. Left heart cath demonstrated 70% LAD ostia stenosis and mid LAD 80% stenosis along with mid RCA 30% stenosis. Thoracic surgical history of left breast biopsies but no large surgeries or radiation therapy.     Patient is active around the house with gardening and yard work.  Non-smoker.  Occasional alcohol use.  No illicit drug use.  Will occasionally get dizzy/lightheaded and increased dyspnea with walking uphill.  However no edema, orthopnea, PND, or chest pain.  Patient does note that she does not want a mechanical valve as she was not want to be on warfarin.       Patient has returned today to discuss results of coronary angiogram.  Also had long discussion with [facility] blood supply and found the process to be exhausted and unlikely to have enough donors for what is necessary.  Patient and family decided to accept generic donor blood to speed up process.  Continues to deny chest pain, edema, or new palpitations         OBJECTIVE:    Vital Signs (Last 24 hours)  /72 (BP Location: Right arm, Patient Position: Sitting, Cuff Size: Regular Adult)   Pulse 68   Temp 36.4 °C (97.5 °F) (Oral)   Resp 15   Ht 1.676 m (5' 6\")   Wt 95.3 kg (210 lb)   SpO2 91%   BMI 33.89 kg/m²     PHYSICAL EXAM:  GENERAL: Well nourished, no acute distress  NEURO: Alert and Oriented x3, no focal deficits, moves all extremities   HEENT: PERRLA, normocephalic, atraumatic,   CARDIAC: RRR, no murmur, gallop, or rub " appreciated   LUNGS: Clear to auscultation bilaterally, unlabored breathing   ABDOMEN: Soft  NTND, + BS in all 4 quadrants   SKIN: No obvious, rashes, edema, or lesions   PERIPHERAL VASCULAR: + dopplerable pulses bilaterally   PSYCH: Normal mood and affect  TUBES/LINES/WIRE: peripheral IV's   INCISIONS: Clean, dry, intact: PWV to sternum with good suction, no drainage. SVG is healing well without infectious signs.     Chest Tube Output Last 12/24hrs:  removed        Cardiovascular/Inotropic Gtts: none           Volume Status:  Wt Readings from Last 9 Encounters:   06/17/24 95.3 kg (210 lb)   06/12/24 98 kg (216 lb)   04/29/24 97.5 kg (215 lb)   04/12/24 97.1 kg (214 lb)   04/03/24 98.9 kg (218 lb)   03/18/24 98.5 kg (217 lb 2.5 oz)   03/14/24 98.4 kg (217 lb)   03/12/24 100.9 kg (222 lb 6.4 oz)   02/13/24 100.7 kg (222 lb)     I/O this shift:  In: 504.1 [P.O.:350; I.V.:42.1; IV Piggyback:112]  Out: 300 [Urine:300]    Intake/Output Summary (Last 24 hours) at 6/17/2024 0539  Last data filed at 6/17/2024 0352  Gross per 24 hour   Intake 1535.57 ml   Output 901 ml   Net 634.57 ml       Inpatient Medications:  furosemide, 40 mg, oral, 2x daily diuretic  potassium chloride, 20 mEq, oral, Once  lidocaine, 2 patch, Topical, Daily  metOLazone, 10 mg, oral, Daily  metoprolol tartrate, 12.5 mg, oral, 2x daily  ceFAZolin, 2,000 mg, intravenous, q8h  potassium chloride, 20 mEq, oral, 2x daily with meals  scopolamine, 1 patch, Left Ear, q72h  rosuvastatin, 20 mg, oral, Nightly  acetaminophen, 1,000 mg, oral, q6h NILESH   Or  acetaminophen, 650 mg, oral, q6h NILESH  aspirin, 81 mg, oral, Daily  lansoprazole, 15 mg, oral, Daily at 1600  sennosides-docusate sodium, 1 tablet, oral, 2x daily  polyethylene glycol, 17 g, oral, Daily  enoxaparin, 40 mg, subcutaneous, q24h  clopidogreL, 75 mg, oral, Daily  ethyl alcohoL, 1 Application, nasal, 2x daily        LABS:  Lab Results   Component Value Date    WBC 9.3 06/16/2024    HGB 12.6 06/16/2024     HCT 37.1 06/16/2024    MCV 88.5 06/16/2024     06/16/2024     Lab Results   Component Value Date    GLUCOSE 119 (H) 06/17/2024    CALCIUM 10.3 06/17/2024     06/17/2024    K 3.7 06/17/2024    K 3.7 06/17/2024    CO2 28 06/17/2024     06/17/2024    BUN 34 (H) 06/17/2024    CREATININE 1.04 06/17/2024    ANIONGAP 9 06/17/2024     Lab Results   Component Value Date    INR 1.0 06/13/2024    INR 1.0 06/13/2024    INR 0.9 06/12/2024    PT 11.7 06/13/2024    PT 12.0 06/13/2024    PT 10.6 06/12/2024     Lab Results   Component Value Date    APTT 33.7 06/13/2024       Imaging:    EKG:     STS Quality Indicators   YES NO IF NO, WHY?   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  - Sinus bradycardia continues but stable  - Tolerating lopressor  - OOB to chair this am  - CT, wiggins and pacing wires removed yesterday  -Aggressive pulmonary toilet with IS/acapella  -Aggressive ambulation and mobility with PT/OT/CR    Fluid overload expected with intra operative volume resuscitation   - Approaching euvolemia - continue po lasix   - Q6 Electrolyte check    CAD  - Status post 2 vessel bypass   - ASA +Plavix  - Tolerating bblocker  - No ACE/ARB pts EF is greater than 40%    Hyperlipidemia  - High intensity statin     HTN  - Controlled on 12.5mg lopressor    Stress Prophylaxis  - Continue prevacid    DVT prophylaxis  - Subq lovenox bid           This assessment and plan was staffed with attending Cardiothoracic Surgeon Dr. Huynh. Please see any addendums/attestations as indicated.       Renny Pena PA-C   Cardiovascular Surgery   5:39 AM

## 2024-06-17 NOTE — DISCHARGE INSTRUCTIONS
Postoperative Discharge Instructions     No lifting over 10 pounds x 12 weeks post-op or doing activities that strain/cause pain to the sternum x 12 weeks post-op. Continue to follow your sternal precautions.    Cardiac rehab will contact you after discharge to begin your supervised exercise program as long as you are not discharged with home health.  If cardiac rehab does not contact you within 2 weeks of being discharged, please call our clinic.    No driving until follow-up appointment with cardiothoracic surgery or while taking any opioid pain medication.    Use acetaminophen (Tylenol) for pain control on a scheduled basis over the next 3 days. For example to manage your pain take 2-325 mg tablets of acetaminophen (Tylenol) every 6 hours. Maximum dose of Tylenol is 4000 mg per 24 hour period or  tablets daily. Use opioid pain medication for breakthrough or severe postoperative pain only. Use Tramadol 25 mg only as needed for severe pain. Ok to take Tramadol 25 mg every 8 hours as needed. Tramadol is an opioid, keep this medication in your possession and out of reach of those considered vulnerable, such as children.     Continue to regularly use your IS at home every 5-10x per hour. Continue to take dedicated walks and ambulate 4-6 times daily.    Obtain a blood pressure cuff if you do not already have one at home. Take your blood pressure every morning before taking your medications. Keep a log of these measurements and bring to your follow up appointment.    Make an appointment to see your primary care provider within the next 7 to 10 days.  Bring these discharge instructions, your new medication list, and your blood pressure log with you to see your PCP.  Your PCP or cardiologist will manage most of your medications.  If you need medication refills, please call your PCPs office.

## 2024-06-17 NOTE — DISCHARGE SUMMARY
Chief Complaint: No chief complaint on file.    Admitting Provider: Cash Huynh MD  Discharge Provider: Cash Huyhn MD  Primary Care Physician at Discharge: Jacinta Armando [phone]   Length of stay: 4  Admission Date: 6/13/2024  Discharge Date: 6/17/2024    Procedures Performed  Median sternotomy with resection of complex myxoma left atrial  Primary reconstruction of left atrial dome  Bovine patch interatrial septoplasty  CABG x 2 (LIMA to LAD, SVG to OM1)  Right endoscopic greater saphenous vein harvest  Ascending aorta resection with replacement with 28 mm /10 mm Hemashield graft  Deep hypothermic circulatory arrest  Retrograde cerebral perfusion via SVC  Anterior sternal plating with Katie [employer] plates  Application of Prevena wound VAC    HPI:  Ms. Acevedo is a 64-year-old female with PMHx nonalcoholic fatty liver disease, thyroid nodules, hiatal hernia, chronic bronchitis, HTN, HLD, ascending aortic aneurysm (4.5 cm), bicuspid aortic valve, and intra-septal mass who presents for imaging follow-up with Dr. Huynh.  Referred from general cardiology Dr. Pulliam.       Patient was having some increased fatigue in January.  3/14/24 KENDRICK demonstrated a new 1.4 x 1.6 cm well-circumscribed mass attached to the intra-atrial septum near the fossa ovalis.  Bicuspid aortic valve continues to only have mild stenosis with trace regurgitation with aortic root measuring 4.06 cm.  Follow-up carotid with mild, not hemodynamically significant stenosis in bilateral carotids, CTA imaging demonstrated mild atherosclerotic calcifications and 4.5 cm ascending thoracic aortic aneurysm with no evidence of dissection.  Aneurysm is 3.96 cm in 2021, then in July 2023 was 4.2 cm. Left heart cath demonstrated 70% LAD ostia stenosis and mid LAD 80% stenosis along with mid RCA 30% stenosis. Thoracic surgical history of left breast biopsies but no large surgeries or radiation therapy.     Patient is active around the  house with gardening and yard work.  Non-smoker.  Occasional alcohol use.  No illicit drug use.  Will occasionally get dizzy/lightheaded and increased dyspnea with walking uphill.  However no edema, orthopnea, PND, or chest pain.  Patient does note that she does not want a mechanical valve as she was not want to be on warfarin.       Patient has returned today to discuss results of coronary angiogram.  Also had long discussion with [employer] blood supply and found the process to be exhausted and unlikely to have enough donors for what is necessary.  Patient and family decided to accept generic donor blood to speed up process.  Continues to deny chest pain, edema, or new palpitations       COURSE OF HOSPITALIZATION:  Patient underwent myxoma resection, CABG x 2 and ascending aorta replacement on 6/13/24-please see full op note by Dr. Huynh. Postoperative course was complicated by full body volume overload and deconditioning. Both were resolved without complication and patient is ambulating well enough to go home independently with  on POD #4.    PLAN  -retention staples in place to midline sternal incision, to be removed at postop clinic appointment. Patient is educated to use daily island dressings until her postop clinic visit. She is also to continue wearing a surgical bra  -40mg lasix BID with potassium replacement x 5 days- re-evaluate at clinic appointment  -DAPT with asa 81 mg daily for life and 75 mg plavix x 90 days- additional dosing per cardiology  -epicardial wires were clipped at skins edge  -completed a full course of abx for preop UTI    Hospital Problem List  Principal Problem:    CAD (coronary artery disease)  Active Problems:    Acute respiratory insufficiency, postoperative    Allergies as of 05/24/2024 - Reviewed 04/29/2024   Allergen Reaction Noted    [place] Anaphylaxis 07/29/2021    Neomy-polymyxinb-bacitracin-hc  07/29/2021    Ibuprofen Rash 04/03/2024       Discharge  Disposition  Home  Code status at discharge:Full     Outpatient Follow-Up  Future Appointments   Date Time Provider Department Center   6/25/2024  1:30 PM C[facility]AC RE[facility]AB [facility], ADMIT [facility]        Discharge Medications     Your medication list        START taking these medications        Instructions Last Dose Given Next Dose Due   clopidogreL 75 mg tablet  Commonly known as: PLAVIX  Start taking on: June 18, 2024      Take 1 tablet (75 mg total) by mouth daily       furosemide 40 mg tablet  Commonly known as: LASIX      Take 1 tablet (40 mg total) by mouth 2 (two) times a day for 5 days       lansoprazole 15 mg capsule  Commonly known as: PREVACID      Take 1 capsule (15 mg total) by mouth daily       metoprolol tartrate 25 mg tablet  Commonly known as: LOPRESSOR      Take 0.5 tablets (12.5 mg total) by mouth 2 (two) times a day Check your blood pressure and heart rate before taking this medication.  Do not take if heart rate is less than 65 bpm or systolic blood pressure (top number) is less than 100.       potassium chloride 20 mEq CR tablet  Commonly known as: K-TAB      Take 1 tablet (20 mEq total) by mouth 2 (two) times a day for 5 days       rosuvastatin 20 mg tablet  Commonly known as: CRESTOR      Take 1 tablet (20 mg total) by mouth nightly       traMADol 50 mg tablet  Commonly known as: ULTRAM 50      Take 1 tablet (50 mg total) by mouth every 8 (eight) hours as needed for pain scale 8-10/10 for up to 3 days Max Daily Amount: 150 mg              CONTINUE taking these medications        Instructions Last Dose Given Next Dose Due   aspirin 81 mg EC tablet           OMEGA DHA ORAL                  STOP taking these medications      amiodarone 200 mg tablet  Commonly known as: Pacerone        hydroCHLOROthiazide 12.5 mg capsule  Commonly known as: MICROZIDE        sulfamethoxazole-trimethoprim 800-160 mg per tablet  Commonly known as: Bactrim DS                  Where to Get Your Medications        These  medications were sent to [facility] ([facility])  [address] [place] [id number]      Phone: [phone]   clopidogreL 75 mg tablet  furosemide 40 mg tablet  lansoprazole 15 mg capsule  metoprolol tartrate 25 mg tablet  potassium chloride 20 mEq CR tablet  rosuvastatin 20 mg tablet  traMADol 50 mg tablet         Vital signs in last 24 hours:  Temp:  [36.4 °C (97.5 °F)-36.6 °C (97.8 °F)] 36.6 °C (97.8 °F)  Heart Rate:  [61-73] 73  Resp:  [15-20] 18  SpO2:  [90 %-93 %] 92 %  BP: (105-123)/(61-86) 109/70  SpO2/FiO2 Ratio Using Approximate FiO2 (%):  [450] 450  Estimated P/F Ratio Using Approximate FiO2 (%):  [382.8] 382.8  SpO2: 92 %  Oxygen Therapy: None (Room air)    Physical Exam:  GENERAL: Well nourished, no acute distress  NEURO: Alert and Oriented x3, no focal deficits, moves all extremities   HEENT: PERRLA, normocephalic, atraumatic,   CARDIAC: RRR, no murmur, gallop, or rub appreciated   LUNGS: Clear to auscultation bilaterally, unlabored breathing   ABDOMEN: Soft  NTND, + BS in all 4 quadrants   SKIN: No obvious, rashes, edema, or lesions   PERIPHERAL VASCULAR: + dopplerable pulses bilaterally   PSYCH: Normal mood and affect  INCISIONS: well approximated midline sternal incision with staples, no erythema or drainage, minimal eccymosis. Sternal incision and CT sites covered with island dressing    Admission weight: Weight: 99.2 kg (218 lb 11.2 oz)   Wt Readings from Last 9 Encounters:   06/17/24 95.3 kg (210 lb)   06/12/24 98 kg (216 lb)   04/29/24 97.5 kg (215 lb)   04/12/24 97.1 kg (214 lb)   04/03/24 98.9 kg (218 lb)   03/18/24 98.5 kg (217 lb 2.5 oz)   03/14/24 98.4 kg (217 lb)   03/12/24 100.9 kg (222 lb 6.4 oz)   02/13/24 100.7 kg (222 lb)     I/O last 3 completed shifts:  In: 2081.7 [P.O.:1575; I.V.:173; IV Piggyback:333.8]  Out: 1738 [Urine:1670; Chest Tube:68]  I/O this shift:  In: 120 [P.O.:120]  Out: 400 [Urine:400]    Intake/Output Summary (Last 24 hours) at 6/17/2024 1120  Last  data filed at 6/17/2024 1013  Gross per 24 hour   Intake 1256.07 ml   Output 1000 ml   Net 256.07 ml          Dietary Orders   (From admission, onward)                 Start     Ordered    06/17/24 0435  Diet Regular; Cardiac Heart Healthy, Diabetic, Fluid Restricted (total/d); Consistent Carb (no mealtime insulin); 1500 ml; Yes  Diet effective now        Question Answer Comment   Nutrition Therapy Protocol (Dietitian May Adjust Diet and Nourishments) No    Diet type Regular    Diet Additional Cardiac Heart Healthy    Diet Additional Diabetic    Diet Additional Fluid Restricted (total/d)    Diabetic: Consistent Carb (no mealtime insulin)    Fluid Restriction: 1500 ml    Allow Caffeine? Yes        06/17/24 0434                    Labs:  Results from last 4 days   Lab Units 06/17/24  0620 06/16/24  0707 06/15/24  0215 06/14/24  0411 06/13/24  1617 06/13/24  1530   WBC AUTO 10*3/uL 8.9 9.3 10.8* 7.9 5.5 5.1   HEMOGLOBIN g/dL 12.7 12.6 11.8 11.2* 11.2* 8.5*   HEMATOCRIT % 35.8 37.1 33.4* 31.9* 31.2* 24.1*   PLATELETS AUTO 10*3/uL 209 196 183 190 145 153   NEUTROS PCT AUTO %  --   --   --   --   --  84.1*   LYMPHS PCT AUTO %  --   --   --   --   --  11.3   MONOS PCT AUTO %  --   --   --   --   --  3.7   EOS PCT AUTO %  --   --   --   --   --  0.5     Results from last 4 days   Lab Units 06/17/24  0620 06/17/24  0001 06/16/24  1743 06/16/24  1125 06/16/24  0707 06/16/24  0044 06/15/24  2025 06/15/24  1112 06/15/24  0543 06/15/24  0215   SODIUM mmol/L 138 137 137 136 140 138  --  142 146* 146*   POTASSIUM MMOL/L 3.8  3.8 3.7  3.7 3.3*  3.3* 3.3*  3.3* 3.8 4.1  4.1 3.5 3.3*  3.3* 3.7  3.7 3.8  3.8   CHLORIDE mmol/L 101 100 96* 98 98 99  --  101 104 104   CO2 mmol/L 28 28 31 30 32 32  --  32 33* 33*   BUN mg/dL 37* 34* 31* 32* 30* 27*  --  24 23 22   CREATININE mg/dL 1.03 1.04 1.20* 1.13* 1.16* 1.23*  --  1.27* 1.29* 1.26*   GLUCOSE mg/dL 128* 119* 157* 105 112* 118*  --  134* 135* 140*   CALCIUM mg/dL 10.1  10.1  10.3 10.0 9.9 10.4*  10.4* 10.0  --  9.3 9.6 9.6  9.6     Results from last 4 days   Lab Units 06/17/24  0620 06/16/24  0707 06/15/24  0215 06/14/24  0411 06/13/24  1617   MAGNESIUM MG/DL 2.2 2.5* 2.6* 2.8* 3.5*             Results from last 4 days   Lab Units 06/13/24  1617 06/13/24  1530   APTT SECONDS 33.7 31.2   INR  1.0 1.0             Lab Results   Component Value Date    ABOTYPE A 06/12/2024     Lab Results   Component Value Date    CHOL 222 05/18/2021     Lab Results   Component Value Date    HDL 46 05/18/2021     Lab Results   Component Value Date    LDLCALC 140 05/18/2021     Lab Results   Component Value Date    TRIG 104 05/18/2021       RADIOLOGRAPHIC DATA:       DISCHARGE EKG:      Electronically Signed by: Elizabeth Caldwell CNP 6/17/2024   Cardiothoracic Surgery CNP    A voice recognition program was used to aid in documentation of this record. Sometimes words are not presented exactly as they were spoken.  While efforts were made to carefully edit and correct any inaccuracies, some errors may be present.  Please take this into context.  Please contact the provider if errors are identified.

## 2024-06-17 NOTE — PLAN OF CARE
Problem: Potential for Compromised Skin Integrity  Goal: Skin Integrity is Maintained or Improved  Description: INTERVENTIONS:  1. Assess and monitor skin integrity  2. Collaborate with interdisciplinary team and initiate plans and interventions as needed  3. Alternate a full bath with partial baths for elderly   4. Monitor patient's hygiene practices   5. Collaborate with wound, ostomy, and continence nurse  Outcome: Progressing  Goal: Nutritional status is improving  Description: INTERVENTIONS:  1. Monitor and assess patient for malnutrition (ex- brittle hair, bruises, dry skin, pale skin and conjunctiva, muscle wasting, smooth red tongue, and disorientation)  2. Monitor patient's weight and dietary intake as ordered or per policy  3. Determine patient's food preferences and provide high-protein, high-caloric foods as appropriate  4. Assist patient with eating   5. Allow adequate time for meals   6. Encourage patient to take dietary supplement as ordered   7. Collaborate with dietitian  8. Include patient/family/caregiver in decisions related to nutrition  Outcome: Progressing  Goal: MOBILITY IS MAINTAINED OR IMPROVED  Description: INTERVENTIONS  1. Collaborate with interdisciplinary team and initiate plan and interventions as ordered (PT/OT)  2. Encourage ambulation  3. Up to chair for meals  4. Monitor for signs of deconditioning  Outcome: Progressing     Problem: Knowledge Deficit  Goal: Patient/family/caregiver demonstrates understanding of disease process, treatment plan, medications, and discharge instructions  Description: INTERVENTIONS:   1. Complete learning assessment and assess knowledge base  2. Provide teaching at level of understanding   3. Provide teaching via preferred learning methods  Outcome: Progressing     Problem: Urinary Incontinence  Goal: Perineal skin integrity is maintained or improved  Description: INTERVENTIONS:  1. Assess genitourinary system, perineal skin, labs (urinalysis), and  history of incontinence to include past management, aggravating, and alleviating factors  2. Collaborate with interdisciplinary team including wound, ostomy, and continence nurse and initiate plans and interventions as needed  4. Consider urine containment device  5. Apply skin protectant   6. Develop skin care regimen  7. Provide privacy when changing patient's incontinence device to maintain their dignity  Outcome: Progressing     Problem: Safety Adult - Fall  Goal: Free from fall injury  Description: INTERVENTIONS:    Inpatient - Please reference Cares/Safety Flowsheet under [place] Fall Risk for interventions.  Pediatrics - Please reference Peds Daily Cares/Safety Flowsheet under [place] Pediatric Fall Assessment Fall Bundle for interventions  LD/OB - Please reference OB Shift Screening Flowsheet under OB Fall Risk for interventions.  Outcome: Progressing

## 2024-06-17 NOTE — NURSING END OF SHIFT
Nursing End of Shift Summary:    Patient: Jamir Acevedo  MRN: [id number]  : 1960, Age: 64 y.o.    Location: [place] [address]    Nursing Goals  Clinical Goals for the Shift: Monitor cardiac, resp, neuro, and gu status. Promote and maintain safety and comfort. Monior UOP Q1 hr    Narrative Summary of Progress Toward Clinical Goals:  x3 CT pulled out, by Ar ORELLANA at 10 AM. Right IJ was removed. Pressure dressing in place. No bleeding or hematoma noted. Phillips Catheter removed. Pacing wires still attached to patient, but not connected. Pt is able to voice when to go void. Total water intake 825. Estimated output, 500 ml. Pt resting comfortably in bed with heart pillow. Replaced K+, per protocol.     Barriers to Goals/Nursing Concerns:  No    New Patient or Family Concerns/Issues:  No    Shift Summary:   Significant Events & Communications to Providers (Last 12 Hours)       Last 5 Values    No documentation.                 Oxygen Usage (Last 12 Hours)       Last 5 Values       Row Name 24 0810 24 1400                Room Air or Baseline Oxygen Trial by Nursing    Is Patient on Room Air OR on the Same Amount of O2 as at Home? No Yes       Are You Performing the QShift O2 Trial? No No       Reason Trial Is Not Being Performed Does not meet criteria --       Reason Criteria Is Not Met (Link to Policy in Row Information) On > 4 lpm oxygen (Adults) --       O2 Setting at Start of Trial -- Room Air                     Mobility (Last 12 Hours)       Last 5 Values       Row Name 24 0800 24 0833 24 0947 24 1100 24 1107       Mobility    Activity Chair;Ambulate in room;Ambulate in dawkins;Bathroom privileges;Back to bed -- Ambulate in dawkins;Chair Ambulate in room;Bathroom privileges Ambulate in dawkins    Level of Assistance -- -- Modified independent, requires aide device or extra time Modified independent, requires aide device or extra time --    Distance Ambulated (feet) -- -- 603 Feet 30  Feet 393 Feet    Distance Ambulated (Meters Calculated) -- -- 183.79 Meters 9.14 Meters 119.79 Meters    Patient Position -- Up in chair Up in chair -- --    Turning/Repositioning Pillow support -- Shifting weight in chair -- --    Distance Ambulated (Meters Calculated)(Do Not Use) -- -- 183.79 Feet 9.14 Feet 119.79 Feet      Row Name 06/16/24 1200 06/16/24 1214 06/16/24 1500 06/16/24 1600 06/16/24 1700       Mobility    Activity Back to bed;Bathroom privileges -- Ambulate in room;Bathroom privileges;Back to bed -- Ambulate in room;Bathroom privileges;Ambulate in dawkins;Back to bed    Level of Assistance -- -- Modified independent, requires aide device or extra time -- Modified independent, requires aide device or extra time    Distance Ambulated (feet) -- -- 8 Feet -- 393 Feet    Distance Ambulated (Meters Calculated) -- -- 2.44 Meters -- 119.79 Meters    Patient Position -- In bed -- In bed --    Turning/Repositioning Turns self;Pillow support;Supine -- Turns self -- Turns self    Distance Ambulated (Meters Calculated)(Do Not Use) -- -- 2.44 Feet -- 119.79 Feet                  Urethral Catheter       Active Urethral Catheter       None                  Active Lines       Active Central venous catheter / Peripherally inserted central catheter / Implantable Port / Hemodialysis catheter / Midline Catheter       Name Placement date Placement time Site Days    Midline Peripheral 06/13/24 Left Basilic 06/13/24 0625  created via procedure documentation  Basilic  3                  Infusing Medications   Medication Dose Last Rate    dexmedeTOMIDine  0.2-1.5 mcg/kg/hr Stopped (06/13/24 1810)    phenylephrine (ANGEL-SYNEPHRINE) infusion orderable   mcg/min      norEPINEPHrine  0.5-30 mcg/min Stopped (06/14/24 0609)    DOButamine  2.5-20 mcg/kg/min Stopped (06/14/24 0235)    EPINEPHine infusion orderable  1-10 mcg/min      milrinone  0.0625-0.75 mcg/kg/min      vasopressin 40 Units in sodium chloride 0.9 % 100 mL  infusion (POST CARDIAC SURGERY)  0.01-0.1 Units/min      insulin regular 100 units in 100 mL infusion orderable (Non-DKA/Non-OB) calculator  0-28 Units/hr Stopped (06/14/24 1607)     PRN Medications   Medication Dose Last Admin    carisoprodoL  350 mg 350 mg at 06/16/24 0436    promethazine  25 mg 25 mg at 06/14/24 0200    traMADol  50 mg 50 mg at 06/15/24 1811    oxyCODONE  5-10 mg 10 mg at 06/15/24 2117    hydrALAZINE  5-10 mg      calcium gluconate  2 g      magnesium sulfate  4 g      magnesium sulfate  2 g      ondansetron  4 mg 4 mg at 06/15/24 2115    alum-mag hydroxide-simeth  30 mL      magnesium hydroxide  30 mL 30 mL at 06/15/24 1411    Or    magnesium hydroxide  30 mL      Or    magnesium hydroxide  30 mL      bisacodyL  10 mg      levalbuterol (XOPENEX) nebulizer solution 1.25 mg orderable  1.25 mg      ipratropium  0.5 mg      artificial tears  1-2 drop 2 drop at 06/14/24 0200    dextrose 50 % in water (D50W)  15-30 mL      dextrose  15.2 g      glucagon  1 mg      Or    glucagon  1 mg      sodium chloride 0.9% (NS)  25-50 mL Stopped at 06/16/24 0402    sodium chloride 0.9% (NS)  25-50 mL Stopped at 06/16/24 0156    metoclopramide (REGLAN) IV orderable  5 mg 5 mg at 06/14/24 1950

## 2024-06-17 NOTE — INTERDISCIPLINARY/THERAPY
Case Management Discharge Note    Phone # [phone]    Discharge Disposition: Home     Transportation: Private vehicle    Home Health: none    New DME provided: none    Specialty Referrals: none         Active Ambulatory Referrals   (From admission, onward)                None            Support System Notified: spouse at bedside.     RN notified: yes

## 2024-06-17 NOTE — DISCHARGE INSTR - OTHER INFO
Surgical Incision Care  Ok to remove all dressings after 24 hours and then may shower daily  Use gentle soap and water. Allow the soapy water to run down your incision, do not allow the harsh stream of water to directly splash your incision   No vigorous scrubbing, pat dry   No soaking wound until completely healed  No scented lotions or perfumes on incision  Surgical glue sealant will fall off on its own, no picking  Contact the CT surgery ALY Rene at [phone] for any redness, swelling, drainage or fever. They are available from Monday - Friday 8-5pm. If you have any acute SOB, chest pain, or severe symptoms please be evaluated at your closest emergency room

## 2024-06-17 NOTE — INTERDISCIPLINARY/THERAPY
06/17/24 1032   Phase I Resting    Heart Rate (bpm) 3 bpm   Blood Pressure 100/73   O2 Flow Rate 0 L/min   Comment RN okayed amb   Phase I Exercise   Heart Rate (bpm) 86 bpm   Blood Pressure 112/75   O2 Flow Rate 0 L/min   Ambulation Distance (meters) 100 meters   Tolerance Well   Assistive Device Gait belt   Amount of Assistance Required 1 Assist   Comment Pt denies any c/o with amb   Phase I Recovery   Heart Rate (bpm) 76 bpm   Blood Pressure 101/67   O2 Flow Rate 0 L/min   Comment Pt left resting in bed at end of session with call light in reach and  in room.   Cardiac Rehab Phase I   Amount of Time Spent with Patient (mins) 48 minutes   Phase 2 Referral Site [facility]   Comment Post-surgical education completed w/ pt. All questions and concerns were answered at this time. Pt will be referred to [facility]iac Rehab in .

## 2024-06-19 ENCOUNTER — APPOINTMENT (OUTPATIENT)
Dept: RADIOLOGY | Facility: HOSPITAL | Age: 64
End: 2024-06-19
Payer: MEDICARE

## 2024-06-19 ENCOUNTER — HOSPITAL ENCOUNTER (EMERGENCY)
Facility: HOSPITAL | Age: 64
Discharge: HOME | End: 2024-06-20
Attending: EMERGENCY MEDICINE
Payer: MEDICARE

## 2024-06-19 ENCOUNTER — PATIENT OUTREACH (OUTPATIENT)
Dept: FAMILY MEDICINE | Facility: HOSPITAL | Age: 64
End: 2024-06-19
Payer: MEDICARE

## 2024-06-19 DIAGNOSIS — R79.89 ELEVATED TROPONIN: ICD-10-CM

## 2024-06-19 DIAGNOSIS — Z95.1 HISTORY OF TWO VESSEL CORONARY ARTERY BYPASS GRAFT: ICD-10-CM

## 2024-06-19 DIAGNOSIS — D15.1 MYXOMA OF HEART: ICD-10-CM

## 2024-06-19 DIAGNOSIS — R07.9 ACUTE CHEST PAIN: Primary | ICD-10-CM

## 2024-06-19 DIAGNOSIS — I25.10 CORONARY ARTERY DISEASE INVOLVING NATIVE HEART WITHOUT ANGINA PECTORIS, UNSPECIFIED VESSEL OR LESION TYPE: ICD-10-CM

## 2024-06-19 DIAGNOSIS — E87.6 HYPOKALEMIA: ICD-10-CM

## 2024-06-19 DIAGNOSIS — N17.9 ACUTE KIDNEY INJURY (CMS/HCC): ICD-10-CM

## 2024-06-19 LAB
ALBUMIN SERPL-MCNC: 4.8 G/DL (ref 3.5–5.3)
ALP SERPL-CCNC: 89 U/L (ref 50–130)
ALT SERPL-CCNC: 29 U/L (ref 7–52)
ANION GAP SERPL CALC-SCNC: 14 MMOL/L (ref 3–11)
APTT PPP: 29.7 SECONDS (ref 25.1–36.5)
AST SERPL-CCNC: 30 U/L
BASOPHILS # BLD AUTO: 0.1 10*3/UL
BASOPHILS NFR BLD AUTO: 0.6 % (ref 0–2)
BILIRUB SERPL-MCNC: 1.01 MG/DL (ref 0.2–1.4)
BNP SERPL-MCNC: 198 PG/ML (ref 0–100)
BUN SERPL-MCNC: 34 MG/DL (ref 7–25)
CALCIUM ALBUM COR SERPL-MCNC: 10 MG/DL (ref 8.6–10.3)
CALCIUM SERPL-MCNC: 10.6 MG/DL (ref 8.6–10.3)
CHLORIDE SERPL-SCNC: 96 MMOL/L (ref 98–107)
CO2 SERPL-SCNC: 28 MMOL/L (ref 21–32)
CREAT SERPL-MCNC: 1.27 MG/DL (ref 0.6–1.1)
EGFRCR SERPLBLD CKD-EPI 2021: 47 ML/MIN/1.73M*2
EOSINOPHIL # BLD AUTO: 0.5 10*3/UL
EOSINOPHIL NFR BLD AUTO: 5.3 % (ref 0–3)
ERYTHROCYTE [DISTWIDTH] IN BLOOD BY AUTOMATED COUNT: 15.1 % (ref 11.5–14)
FIBRIN D-DIMER (NG/ML) IN PLATELET POOR PLASMA: 5361 NG/MLFEU
GLUCOSE SERPL-MCNC: 118 MG/DL (ref 70–105)
HCT VFR BLD AUTO: 35.9 % (ref 34–45)
HGB BLD-MCNC: 12.9 G/DL (ref 11.5–15.5)
INR BLD: 1
LYMPHOCYTES # BLD AUTO: 2.2 10*3/UL
LYMPHOCYTES NFR BLD AUTO: 24.7 % (ref 11–47)
MAGNESIUM SERPL-MCNC: 1.9 MG/DL (ref 1.8–2.4)
MCH RBC QN AUTO: 30.3 PG (ref 28–33)
MCHC RBC AUTO-ENTMCNC: 35.9 G/DL (ref 32–36)
MCV RBC AUTO: 84.3 FL (ref 81–97)
MONOCYTES # BLD AUTO: 1.1 10*3/UL
MONOCYTES NFR BLD AUTO: 12.1 % (ref 3–11)
NEUTROPHILS # BLD AUTO: 5.1 10*3/UL
NEUTROPHILS NFR BLD AUTO: 57.3 % (ref 41–81)
PLATELET # BLD AUTO: 281 10*3/UL (ref 140–350)
PMV BLD AUTO: 8.7 FL (ref 6.9–10.8)
POTASSIUM SERPL-SCNC: 2.8 MMOL/L (ref 3.5–5.1)
PROT SERPL-MCNC: 7.5 G/DL (ref 6–8.3)
PROTHROMBIN TIME: 11.1 SECONDS (ref 9.4–12.5)
RBC # BLD AUTO: 4.26 10*6/ΜL (ref 3.7–5.3)
SODIUM SERPL-SCNC: 138 MMOL/L (ref 135–145)
TROPONIN I SERPL-MCNC: 389.3 PG/ML
WBC # BLD AUTO: 9 10*3/UL (ref 4.5–10.5)

## 2024-06-19 PROCEDURE — 93005 ELECTROCARDIOGRAM TRACING: CPT

## 2024-06-19 PROCEDURE — 71045 X-RAY EXAM CHEST 1 VIEW: CPT

## 2024-06-19 PROCEDURE — 36415 COLL VENOUS BLD VENIPUNCTURE: CPT | Performed by: EMERGENCY MEDICINE

## 2024-06-19 PROCEDURE — 85025 COMPLETE CBC W/AUTO DIFF WBC: CPT | Performed by: EMERGENCY MEDICINE

## 2024-06-19 PROCEDURE — 83735 ASSAY OF MAGNESIUM: CPT | Performed by: EMERGENCY MEDICINE

## 2024-06-19 PROCEDURE — 99284 EMERGENCY DEPT VISIT MOD MDM: CPT | Performed by: EMERGENCY MEDICINE

## 2024-06-19 PROCEDURE — 85730 THROMBOPLASTIN TIME PARTIAL: CPT | Performed by: EMERGENCY MEDICINE

## 2024-06-19 PROCEDURE — 84484 ASSAY OF TROPONIN QUANT: CPT | Performed by: EMERGENCY MEDICINE

## 2024-06-19 PROCEDURE — 85610 PROTHROMBIN TIME: CPT | Performed by: EMERGENCY MEDICINE

## 2024-06-19 PROCEDURE — 80053 COMPREHEN METABOLIC PANEL: CPT | Performed by: EMERGENCY MEDICINE

## 2024-06-19 PROCEDURE — 83880 ASSAY OF NATRIURETIC PEPTIDE: CPT | Performed by: EMERGENCY MEDICINE

## 2024-06-19 PROCEDURE — 85379 FIBRIN DEGRADATION QUANT: CPT | Performed by: EMERGENCY MEDICINE

## 2024-06-19 RX ORDER — SODIUM CHLORIDE 9 MG/ML
25-50 INJECTION, SOLUTION INTRAVENOUS AS NEEDED
Status: DISCONTINUED | OUTPATIENT
Start: 2024-06-19 | End: 2024-06-20 | Stop reason: HOSPADM

## 2024-06-19 RX ORDER — SODIUM CHLORIDE 0.9 % (FLUSH) 0.9 %
3 SYRINGE (ML) INJECTION AS NEEDED
Status: DISCONTINUED | OUTPATIENT
Start: 2024-06-19 | End: 2024-06-20 | Stop reason: HOSPADM

## 2024-06-19 ASSESSMENT — PAIN DESCRIPTION - DESCRIPTORS: DESCRIPTORS: ACHING

## 2024-06-19 NOTE — PROGRESS NOTES
Jamir Acevedo was contacted following recent hospitalization at [facility]. The patient was discharged from the hospital on 6/17/2024 .The Discharge Summary and After Visit Summary were reviewed. The patient's main diagnosis during the hospitalization was Coronary heart disease.  Followup appointment: 6/24/24 HVI. Any additional testing and labs will be discussed at the patient's upcoming post-hospital follow up appointment.    Transitional Care Management Follow Up (most recent)       Transitional Care Management - 06/19/24 1327          OTHER    Date of post hospital outreach 06/19/24     Contact Status Contact done     Speaking with the Patient or Patient's Caregiver? Patient     Is the patient on the Diabetes registry? N     Were patient's home medications changed or did they have any new medications added during the hospitalization? Y     Did someone go over the discharge summary with the patient or the patient's caregiver and discuss the medications listed on it? Y     Does the patient or patient's caregiver have any questions about the medication changes? N     Patient verbalized understanding of when to contact health care provider or when to get help right away? Y     Discharge instructions reviewed with patient or patient's caregiver and all questions answered? Y     Is there a Home Health/DME Plan being enacted? Please note name of HH agency, DME vendor, contacted/received Y    cardiac rehab 2    Does patient have psychosocial issues that might impact their health status? None     Does patient have financial barriers to care? None                      Maia Brooks RN  June 19, 2024 1:35 PM
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
## 2024-06-20 ENCOUNTER — TELEPHONE (OUTPATIENT)
Dept: CARDIOTHORACIC SURGERY | Facility: CLINIC | Age: 64
End: 2024-06-20
Payer: MEDICARE

## 2024-06-20 ENCOUNTER — APPOINTMENT (OUTPATIENT)
Dept: CT IMAGING | Facility: HOSPITAL | Age: 64
End: 2024-06-20
Payer: MEDICARE

## 2024-06-20 VITALS
HEIGHT: 66 IN | RESPIRATION RATE: 15 BRPM | DIASTOLIC BLOOD PRESSURE: 73 MMHG | OXYGEN SATURATION: 92 % | WEIGHT: 209 LBS | TEMPERATURE: 98.1 F | HEART RATE: 72 BPM | BODY MASS INDEX: 33.59 KG/M2 | SYSTOLIC BLOOD PRESSURE: 108 MMHG

## 2024-06-20 LAB
DELTA HIGH SENSITIVITY TROPONIN I, 2 HOUR: -52.1
TROPONIN I SERPL-MCNC: 337.2 PG/ML

## 2024-06-20 PROCEDURE — 99284 EMERGENCY DEPT VISIT MOD MDM: CPT

## 2024-06-20 PROCEDURE — 36415 COLL VENOUS BLD VENIPUNCTURE: CPT | Performed by: EMERGENCY MEDICINE

## 2024-06-20 PROCEDURE — 2580000300 HC RX 258: Performed by: EMERGENCY MEDICINE

## 2024-06-20 PROCEDURE — 2550000100 HC RX 255: Performed by: EMERGENCY MEDICINE

## 2024-06-20 PROCEDURE — 6370000100 HC RX 637 (ALT 250 FOR IP): Performed by: EMERGENCY MEDICINE

## 2024-06-20 PROCEDURE — 84484 ASSAY OF TROPONIN QUANT: CPT | Performed by: EMERGENCY MEDICINE

## 2024-06-20 PROCEDURE — 96360 HYDRATION IV INFUSION INIT: CPT

## 2024-06-20 PROCEDURE — 71275 CT ANGIOGRAPHY CHEST: CPT

## 2024-06-20 RX ORDER — IOPAMIDOL 755 MG/ML
80 INJECTION, SOLUTION INTRAVASCULAR ONCE
Status: COMPLETED | OUTPATIENT
Start: 2024-06-20 | End: 2024-06-20

## 2024-06-20 RX ORDER — POTASSIUM CHLORIDE 1.5 G/1.58G
40 POWDER, FOR SOLUTION ORAL ONCE
Status: COMPLETED | OUTPATIENT
Start: 2024-06-20 | End: 2024-06-20

## 2024-06-20 RX ORDER — LANOLIN ALCOHOL/MO/W.PET/CERES
400 CREAM (GRAM) TOPICAL ONCE
Status: COMPLETED | OUTPATIENT
Start: 2024-06-20 | End: 2024-06-20

## 2024-06-20 RX ORDER — SODIUM CHLORIDE 9 MG/ML
1000 INJECTION, SOLUTION INTRAVENOUS ONCE
Status: COMPLETED | OUTPATIENT
Start: 2024-06-20 | End: 2024-06-20

## 2024-06-20 RX ADMIN — SODIUM CHLORIDE 1000 ML: 9 INJECTION, SOLUTION INTRAVENOUS at 00:24

## 2024-06-20 RX ADMIN — POTASSIUM CHLORIDE 40 MEQ: 1.5 FOR SOLUTION ORAL at 02:37

## 2024-06-20 RX ADMIN — MAGNESIUM OXIDE 400 MG: 400 TABLET ORAL at 02:35

## 2024-06-20 RX ADMIN — IOPAMIDOL 80 ML: 755 INJECTION, SOLUTION INTRAVENOUS at 00:30

## 2024-06-20 ASSESSMENT — ENCOUNTER SYMPTOMS
DYSURIA: 0
FOCAL WEAKNESS: 1
CHILLS: 0
HEADACHES: 0
PALPITATIONS: 0
PERSISTENT INFECTIONS: 0
TREMORS: 0
IRREGULAR HEARTBEAT: 0
LIGHT-HEADEDNESS: 1
DIARRHEA: 0
GASTROINTESTINAL NEGATIVE: 1
RESPIRATORY NEGATIVE: 1
VISUAL CHANGE: 0
WEAKNESS: 0
CONSTITUTIONAL NEGATIVE: 1
DECREASED APPETITE: 0
FALLS: 0
BACK PAIN: 1
BACK PAIN: 1
NEAR-SYNCOPE: 0
ORTHOPNEA: 0
MEMORY LOSS: 0
BLACKOUTS: 0
ALTERED MENTAL STATUS: 0
LOSS OF BALANCE: 1
SUSPICIOUS LESIONS: 0
SEIZURES: 0
DECREASED LIBIDO: 1
BRUISES/BLEEDS EASILY: 1
NEUROLOGICAL NEGATIVE: 1
MYALGIAS: 0
POLYDIPSIA: 1
SPUTUM PRODUCTION: 0
NUMBNESS: 1
HEMATOCHEZIA: 0
UNUSUAL HAIR DISTRIBUTION: 0
VOMITING: 0
VISUAL HALOS: 0
SORE THROAT: 0
NAUSEA: 0
SLEEP DISTURBANCES DUE TO BREATHING: 0
SYNCOPE: 0
DEPRESSION: 0
HALLUCINATIONS: 0
WEIGHT GAIN: 0
DIZZINESS: 1
SENSORY CHANGE: 0
TROUBLE SWALLOWING: 0
JOINT SWELLING: 0
EYES NEGATIVE: 1
WEIGHT LOSS: 1
DIAPHORESIS: 0
SNORING: 0
FEVER: 0
POOR WOUND HEALING: 0
HEMOPTYSIS: 0
NIGHT SWEATS: 0
COLOR CHANGE: 0
CLAUDICATION: 0
ODYNOPHAGIA: 0
NERVOUS/ANXIOUS: 0
DOUBLE VISION: 0
SHORTNESS OF BREATH: 1
HEMATURIA: 0
PND: 0

## 2024-06-20 NOTE — TELEPHONE ENCOUNTER
Jamir called to let me know this morning that she was in the ER last evening with chest pain.  Jamir stated that did testing for blood clots and did lab work, CXR and the ER found that her potassium was low at 2.8 so they gave her liquid potassium to bring her potassium level up.  Jamir stated that her chest pain was pretty much gone by the time she arrived to the ER.  Jamir stated that she was very tired today and she felt weak.  Jamir also stated that after she got home last evening from the ER she ended up vomitting up the potassium that she drank and today she was having diarrhea.  I let her know that I want to talk with Manuela Sánchez PA-C to see what she wanted to do.    I talked with Manuela and passed all of the information that Jamir told me.  Manuela recommended to double her potassium until she sees her on Monday the 24th.      I then let Jamir know that Manuela wants to have her take 40 meq of potassium two times a day starting with tonights dose.  I then asked where Jamir would like me to send the prescription for the additional potassium and she stated that she wants it sent to the hospital pharmacy.  I told her that the prescription was sent to the pharmacy and that it closed at 1800 tonight.     Jamir voiced her understanding of the information given to her.
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
## 2024-06-22 ENCOUNTER — APPOINTMENT (OUTPATIENT)
Dept: RADIOLOGY | Facility: HOSPITAL | Age: 64
End: 2024-06-22
Payer: MEDICARE

## 2024-06-22 ENCOUNTER — HOSPITAL ENCOUNTER (EMERGENCY)
Facility: HOSPITAL | Age: 64
Discharge: HOME | End: 2024-06-23
Attending: EMERGENCY MEDICINE
Payer: MEDICARE

## 2024-06-22 DIAGNOSIS — R09.1 PLEURITIS: Primary | ICD-10-CM

## 2024-06-22 DIAGNOSIS — Z95.1 STATUS POST CORONARY ARTERY BYPASS GRAFT: ICD-10-CM

## 2024-06-22 PROCEDURE — 84484 ASSAY OF TROPONIN QUANT: CPT | Performed by: EMERGENCY MEDICINE

## 2024-06-22 PROCEDURE — 93005 ELECTROCARDIOGRAM TRACING: CPT

## 2024-06-22 PROCEDURE — 85025 COMPLETE CBC W/AUTO DIFF WBC: CPT | Performed by: EMERGENCY MEDICINE

## 2024-06-22 PROCEDURE — 71046 X-RAY EXAM CHEST 2 VIEWS: CPT

## 2024-06-22 PROCEDURE — 36415 COLL VENOUS BLD VENIPUNCTURE: CPT | Performed by: EMERGENCY MEDICINE

## 2024-06-22 PROCEDURE — 86140 C-REACTIVE PROTEIN: CPT | Performed by: EMERGENCY MEDICINE

## 2024-06-22 PROCEDURE — 85652 RBC SED RATE AUTOMATED: CPT | Performed by: EMERGENCY MEDICINE

## 2024-06-22 PROCEDURE — 99285 EMERGENCY DEPT VISIT HI MDM: CPT | Performed by: EMERGENCY MEDICINE

## 2024-06-22 PROCEDURE — 80053 COMPREHEN METABOLIC PANEL: CPT | Performed by: EMERGENCY MEDICINE

## 2024-06-22 RX ORDER — COLCHICINE 0.6 MG/1
1.2 TABLET ORAL ONCE
Status: COMPLETED | OUTPATIENT
Start: 2024-06-22 | End: 2024-06-23

## 2024-06-22 ASSESSMENT — PAIN DESCRIPTION - DESCRIPTORS: DESCRIPTORS: SHARP

## 2024-06-23 VITALS
TEMPERATURE: 99 F | SYSTOLIC BLOOD PRESSURE: 105 MMHG | RESPIRATION RATE: 22 BRPM | WEIGHT: 211.42 LBS | DIASTOLIC BLOOD PRESSURE: 66 MMHG | HEART RATE: 71 BPM | OXYGEN SATURATION: 93 % | BODY MASS INDEX: 34.12 KG/M2

## 2024-06-23 LAB
ALBUMIN SERPL-MCNC: 4 G/DL (ref 3.5–5.3)
ALP SERPL-CCNC: 70 U/L (ref 50–130)
ALT SERPL-CCNC: 19 U/L (ref 7–52)
ANION GAP SERPL CALC-SCNC: 9 MMOL/L (ref 3–11)
AST SERPL-CCNC: 19 U/L
BASOPHILS # BLD AUTO: 0.1 10*3/UL
BASOPHILS NFR BLD AUTO: 0.6 % (ref 0–2)
BILIRUB SERPL-MCNC: 0.69 MG/DL (ref 0.2–1.4)
BUN SERPL-MCNC: 25 MG/DL (ref 7–25)
CALCIUM ALBUM COR SERPL-MCNC: 9.1 MG/DL (ref 8.6–10.3)
CALCIUM SERPL-MCNC: 9.1 MG/DL (ref 8.6–10.3)
CHLORIDE SERPL-SCNC: 107 MMOL/L (ref 98–107)
CO2 SERPL-SCNC: 24 MMOL/L (ref 21–32)
CREAT SERPL-MCNC: 1.09 MG/DL (ref 0.6–1.1)
CRP SERPL-MCNC: 16 MG/L
DELTA HIGH SENSITIVITY TROPONIN I, 1 HOUR: -5.1
EGFRCR SERPLBLD CKD-EPI 2021: 56 ML/MIN/1.73M*2
EOSINOPHIL # BLD AUTO: 0.3 10*3/UL
EOSINOPHIL NFR BLD AUTO: 3.8 % (ref 0–3)
ERYTHROCYTE [DISTWIDTH] IN BLOOD BY AUTOMATED COUNT: 14.8 % (ref 11.5–14)
ERYTHROCYTE [SEDIMENTATION RATE] IN BLOOD: 20 MM/HR
GLUCOSE SERPL-MCNC: 129 MG/DL (ref 70–105)
HCT VFR BLD AUTO: 30.3 % (ref 34–45)
HGB BLD-MCNC: 11.3 G/DL (ref 11.5–15.5)
LYMPHOCYTES # BLD AUTO: 1.1 10*3/UL
LYMPHOCYTES NFR BLD AUTO: 12.3 % (ref 11–47)
MCH RBC QN AUTO: 32.9 PG (ref 28–33)
MCHC RBC AUTO-ENTMCNC: 34.5 G/DL (ref 32–36)
MCV RBC AUTO: 88.8 FL (ref 81–97)
MONOCYTES # BLD AUTO: 0.7 10*3/UL
MONOCYTES NFR BLD AUTO: 8.3 % (ref 3–11)
NEUTROPHILS # BLD AUTO: 6.6 10*3/UL
NEUTROPHILS NFR BLD AUTO: 75 % (ref 41–81)
PLATELET # BLD AUTO: 261 10*3/UL (ref 140–350)
PMV BLD AUTO: 8.2 FL (ref 6.9–10.8)
POTASSIUM SERPL-SCNC: 4 MMOL/L (ref 3.5–5.1)
PROT SERPL-MCNC: 6.2 G/DL (ref 6–8.3)
RBC # BLD AUTO: 3.42 10*6/ΜL (ref 3.7–5.3)
SODIUM SERPL-SCNC: 140 MMOL/L (ref 135–145)
TROPONIN I SERPL-MCNC: 116.3 PG/ML
TROPONIN I SERPL-MCNC: 121.4 PG/ML
WBC # BLD AUTO: 8.8 10*3/UL (ref 4.5–10.5)

## 2024-06-23 PROCEDURE — 6370000100 HC RX 637 (ALT 250 FOR IP): Performed by: EMERGENCY MEDICINE

## 2024-06-23 PROCEDURE — 84484 ASSAY OF TROPONIN QUANT: CPT | Performed by: EMERGENCY MEDICINE

## 2024-06-23 PROCEDURE — 36415 COLL VENOUS BLD VENIPUNCTURE: CPT | Performed by: EMERGENCY MEDICINE

## 2024-06-23 RX ORDER — PREDNISONE 10 MG/1
1 TABLET ORAL ONCE
Status: COMPLETED | OUTPATIENT
Start: 2024-06-23 | End: 2024-06-23

## 2024-06-23 RX ADMIN — PREDNISONE 1 PACKAGE: 10 TABLET ORAL at 01:05

## 2024-06-23 RX ADMIN — COLCHICINE 1.2 MG: 0.6 TABLET, FILM COATED ORAL at 00:16
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
## 2024-06-23 NOTE — DISCHARGE INSTRUCTIONS
Please take the provided steroids to help with what may be inflammatory pain.  No signs of any other dangerous problems at this time.  Recommend you continue with the inspirometer and also schedule 1000 mg acetaminophen every 6 hours for the next 2 or 3 days.  Please keep your follow-up visit on Monday.  Return here if you have any new or worsening symptoms

## 2024-06-24 ENCOUNTER — LAB (OUTPATIENT)
Dept: LAB | Facility: HOSPITAL | Age: 64
End: 2024-06-24
Payer: MEDICARE

## 2024-06-24 ENCOUNTER — TELEPHONE (OUTPATIENT)
Dept: CARDIAC REHAB | Facility: HOSPITAL | Age: 64
End: 2024-06-24
Payer: MEDICARE

## 2024-06-24 ENCOUNTER — OFFICE VISIT (OUTPATIENT)
Dept: CARDIOTHORACIC SURGERY | Facility: CLINIC | Age: 64
End: 2024-06-24
Payer: MEDICARE

## 2024-06-24 ENCOUNTER — HOSPITAL ENCOUNTER (OUTPATIENT)
Dept: RADIOLOGY | Facility: HOSPITAL | Age: 64
Discharge: HOME | End: 2024-06-24
Payer: MEDICARE

## 2024-06-24 VITALS
WEIGHT: 211 LBS | BODY MASS INDEX: 33.91 KG/M2 | HEIGHT: 66 IN | RESPIRATION RATE: 18 BRPM | DIASTOLIC BLOOD PRESSURE: 78 MMHG | HEART RATE: 66 BPM | OXYGEN SATURATION: 96 % | SYSTOLIC BLOOD PRESSURE: 123 MMHG

## 2024-06-24 DIAGNOSIS — I25.10 CORONARY ARTERY DISEASE INVOLVING NATIVE CORONARY ARTERY OF NATIVE HEART WITHOUT ANGINA PECTORIS: ICD-10-CM

## 2024-06-24 DIAGNOSIS — D15.1 MYXOMA OF HEART: Primary | ICD-10-CM

## 2024-06-24 DIAGNOSIS — I71.21 ANEURYSM OF ASCENDING AORTA WITHOUT RUPTURE (CMS/HCC): ICD-10-CM

## 2024-06-24 DIAGNOSIS — J98.11 ATELECTASIS: ICD-10-CM

## 2024-06-24 LAB
ANION GAP SERPL CALC-SCNC: 6 MMOL/L (ref 3–11)
BASOPHILS # BLD AUTO: 0.1 10*3/UL
BASOPHILS NFR BLD AUTO: 0.5 % (ref 0–2)
BUN SERPL-MCNC: 20 MG/DL (ref 7–25)
CALCIUM SERPL-MCNC: 10.6 MG/DL (ref 8.6–10.3)
CHLORIDE SERPL-SCNC: 109 MMOL/L (ref 98–107)
CO2 SERPL-SCNC: 25 MMOL/L (ref 21–32)
CREAT SERPL-MCNC: 0.73 MG/DL (ref 0.6–1.1)
EGFRCR SERPLBLD CKD-EPI 2021: 91 ML/MIN/1.73M*2
EOSINOPHIL # BLD AUTO: 0.3 10*3/UL
EOSINOPHIL NFR BLD AUTO: 1.9 % (ref 0–3)
ERYTHROCYTE [DISTWIDTH] IN BLOOD BY AUTOMATED COUNT: 15.1 % (ref 11.5–14)
GLUCOSE SERPL-MCNC: 105 MG/DL (ref 70–105)
HCT VFR BLD AUTO: 34 % (ref 34–45)
HGB BLD-MCNC: 11.7 G/DL (ref 11.5–15.5)
LYMPHOCYTES # BLD AUTO: 1 10*3/UL
LYMPHOCYTES NFR BLD AUTO: 6.9 % (ref 11–47)
MCH RBC QN AUTO: 31.1 PG (ref 28–33)
MCHC RBC AUTO-ENTMCNC: 34.3 G/DL (ref 32–36)
MCV RBC AUTO: 90.5 FL (ref 81–97)
MONOCYTES # BLD AUTO: 0.5 10*3/UL
MONOCYTES NFR BLD AUTO: 3.2 % (ref 3–11)
NEUTROPHILS # BLD AUTO: 12.7 10*3/UL
NEUTROPHILS NFR BLD AUTO: 87.5 % (ref 41–81)
PLATELET # BLD AUTO: 365 10*3/UL (ref 140–350)
PMV BLD AUTO: 8.2 FL (ref 6.9–10.8)
POTASSIUM SERPL-SCNC: 5.2 MMOL/L (ref 3.5–5.1)
RBC # BLD AUTO: 3.76 10*6/ΜL (ref 3.7–5.3)
SODIUM SERPL-SCNC: 140 MMOL/L (ref 135–145)
WBC # BLD AUTO: 14.5 10*3/UL (ref 4.5–10.5)

## 2024-06-24 PROCEDURE — 99024 POSTOP FOLLOW-UP VISIT: CPT | Performed by: STUDENT IN AN ORGANIZED HEALTH CARE EDUCATION/TRAINING PROGRAM

## 2024-06-24 PROCEDURE — 93010 ELECTROCARDIOGRAM REPORT: CPT | Performed by: INTERNAL MEDICINE

## 2024-06-24 PROCEDURE — 36415 COLL VENOUS BLD VENIPUNCTURE: CPT

## 2024-06-24 PROCEDURE — 85025 COMPLETE CBC W/AUTO DIFF WBC: CPT

## 2024-06-24 PROCEDURE — 80048 BASIC METABOLIC PNL TOTAL CA: CPT

## 2024-06-24 PROCEDURE — 71046 X-RAY EXAM CHEST 2 VIEWS: CPT

## 2024-06-24 PROCEDURE — 93005 ELECTROCARDIOGRAM TRACING: CPT | Performed by: THORACIC SURGERY (CARDIOTHORACIC VASCULAR SURGERY)

## 2024-06-24 ASSESSMENT — PAIN SCALES - GENERAL: PAINLEVEL: 0-NO PAIN

## 2024-06-25 ENCOUNTER — HOSPITAL ENCOUNTER (OUTPATIENT)
Dept: CARDIAC REHAB | Facility: HOSPITAL | Age: 64
Discharge: STILL ADMITTED OR ADMINISTRATIVE | End: 2024-06-25
Payer: MEDICARE

## 2024-06-25 VITALS — HEART RATE: 72 BPM | BODY MASS INDEX: 34.39 KG/M2 | HEIGHT: 66 IN | WEIGHT: 214 LBS

## 2024-06-25 DIAGNOSIS — Z95.1 S/P 2-VESSEL CORONARY ARTERY BYPASS: ICD-10-CM

## 2024-06-25 PROCEDURE — 93798 PHYS/QHP OP CAR RHAB W/ECG: CPT
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
## 2024-06-26 ENCOUNTER — HOSPITAL ENCOUNTER (OUTPATIENT)
Dept: CARDIAC REHAB | Facility: HOSPITAL | Age: 64
Discharge: STILL ADMITTED OR ADMINISTRATIVE | End: 2024-06-26
Payer: MEDICARE

## 2024-06-26 DIAGNOSIS — Z95.1 S/P 2-VESSEL CORONARY ARTERY BYPASS: ICD-10-CM

## 2024-06-26 PROCEDURE — 93798 PHYS/QHP OP CAR RHAB W/ECG: CPT

## 2024-06-28 ENCOUNTER — HOSPITAL ENCOUNTER (OUTPATIENT)
Dept: CARDIAC REHAB | Facility: HOSPITAL | Age: 64
Discharge: STILL ADMITTED OR ADMINISTRATIVE | End: 2024-06-28
Payer: MEDICARE

## 2024-06-28 DIAGNOSIS — Z95.1 S/P 2-VESSEL CORONARY ARTERY BYPASS: ICD-10-CM

## 2024-06-28 PROCEDURE — 93798 PHYS/QHP OP CAR RHAB W/ECG: CPT

## 2024-07-01 ENCOUNTER — HOSPITAL ENCOUNTER (OUTPATIENT)
Dept: CARDIAC REHAB | Facility: HOSPITAL | Age: 64
Discharge: STILL ADMITTED OR ADMINISTRATIVE | End: 2024-07-01
Payer: MEDICARE

## 2024-07-01 DIAGNOSIS — Z95.1 S/P 2-VESSEL CORONARY ARTERY BYPASS: ICD-10-CM

## 2024-07-01 PROCEDURE — 93798 PHYS/QHP OP CAR RHAB W/ECG: CPT

## 2024-07-03 ENCOUNTER — HOSPITAL ENCOUNTER (OUTPATIENT)
Dept: CARDIAC REHAB | Facility: HOSPITAL | Age: 64
Discharge: STILL ADMITTED OR ADMINISTRATIVE | End: 2024-07-03
Payer: MEDICARE

## 2024-07-03 DIAGNOSIS — Z95.1 S/P 2-VESSEL CORONARY ARTERY BYPASS: ICD-10-CM

## 2024-07-03 PROCEDURE — 93798 PHYS/QHP OP CAR RHAB W/ECG: CPT

## 2024-07-05 ENCOUNTER — HOSPITAL ENCOUNTER (OUTPATIENT)
Dept: CARDIAC REHAB | Facility: HOSPITAL | Age: 64
Discharge: STILL ADMITTED OR ADMINISTRATIVE | End: 2024-07-05
Payer: MEDICARE

## 2024-07-05 DIAGNOSIS — Z95.1 S/P 2-VESSEL CORONARY ARTERY BYPASS: ICD-10-CM

## 2024-07-05 PROCEDURE — 93798 PHYS/QHP OP CAR RHAB W/ECG: CPT

## 2024-07-05 ASSESSMENT — ENCOUNTER SYMPTOMS
BRUISES/BLEEDS EASILY: 0
DIARRHEA: 0
DEPRESSION: 0
JOINT SWELLING: 0
COLOR CHANGE: 0
WEAKNESS: 0
MYALGIAS: 0
SUSPICIOUS LESIONS: 0
VISUAL CHANGE: 0
SORE THROAT: 0
WEIGHT GAIN: 0
NERVOUS/ANXIOUS: 0
POOR WOUND HEALING: 0
DECREASED LIBIDO: 1
SPUTUM PRODUCTION: 0
DECREASED APPETITE: 0
IRREGULAR HEARTBEAT: 0
PERSISTENT INFECTIONS: 0
DIZZINESS: 0
UNUSUAL HAIR DISTRIBUTION: 0
VOMITING: 0
SYNCOPE: 0
POLYDIPSIA: 0
ALTERED MENTAL STATUS: 0
TROUBLE SWALLOWING: 0
TREMORS: 0
HEMATOCHEZIA: 0
NAUSEA: 0
DIAPHORESIS: 0
SNORING: 0
SLEEP DISTURBANCES DUE TO BREATHING: 0
DYSURIA: 0
NEAR-SYNCOPE: 0
FOCAL WEAKNESS: 0
HALLUCINATIONS: 0
DOUBLE VISION: 0
FALLS: 0
ORTHOPNEA: 0
PALPITATIONS: 0
PND: 0
FEVER: 0
NIGHT SWEATS: 0
VISUAL HALOS: 0
LOSS OF BALANCE: 0
SEIZURES: 0
LIGHT-HEADEDNESS: 0
CLAUDICATION: 0
MEMORY LOSS: 0
SENSORY CHANGE: 0
ODYNOPHAGIA: 0
CHILLS: 0
WEIGHT LOSS: 0
SHORTNESS OF BREATH: 0
NUMBNESS: 0
BLACKOUTS: 0
HEADACHES: 0
BACK PAIN: 0
HEMATURIA: 0
HEMOPTYSIS: 0

## 2024-07-08 ENCOUNTER — OFFICE VISIT (OUTPATIENT)
Dept: CARDIOTHORACIC SURGERY | Facility: CLINIC | Age: 64
End: 2024-07-08
Payer: MEDICARE

## 2024-07-08 ENCOUNTER — HOSPITAL ENCOUNTER (OUTPATIENT)
Dept: CARDIAC REHAB | Facility: HOSPITAL | Age: 64
Discharge: STILL ADMITTED OR ADMINISTRATIVE | End: 2024-07-08
Payer: MEDICARE

## 2024-07-08 VITALS
HEIGHT: 66 IN | OXYGEN SATURATION: 97 % | WEIGHT: 210 LBS | BODY MASS INDEX: 33.75 KG/M2 | RESPIRATION RATE: 18 BRPM | SYSTOLIC BLOOD PRESSURE: 133 MMHG | DIASTOLIC BLOOD PRESSURE: 82 MMHG | HEART RATE: 71 BPM

## 2024-07-08 DIAGNOSIS — Q23.81 BICUSPID AORTIC VALVE: ICD-10-CM

## 2024-07-08 DIAGNOSIS — Z95.1 S/P 2-VESSEL CORONARY ARTERY BYPASS: ICD-10-CM

## 2024-07-08 DIAGNOSIS — I25.10 CORONARY ARTERY DISEASE INVOLVING NATIVE CORONARY ARTERY OF NATIVE HEART WITHOUT ANGINA PECTORIS: Primary | ICD-10-CM

## 2024-07-08 PROCEDURE — 99024 POSTOP FOLLOW-UP VISIT: CPT | Performed by: STUDENT IN AN ORGANIZED HEALTH CARE EDUCATION/TRAINING PROGRAM

## 2024-07-08 PROCEDURE — 93798 PHYS/QHP OP CAR RHAB W/ECG: CPT

## 2024-07-08 ASSESSMENT — PAIN SCALES - GENERAL: PAINLEVEL: 0-NO PAIN

## 2024-07-10 ENCOUNTER — HOSPITAL ENCOUNTER (OUTPATIENT)
Dept: CARDIAC REHAB | Facility: HOSPITAL | Age: 64
Discharge: STILL ADMITTED OR ADMINISTRATIVE | End: 2024-07-10
Payer: MEDICARE

## 2024-07-10 DIAGNOSIS — Z95.1 S/P 2-VESSEL CORONARY ARTERY BYPASS: ICD-10-CM

## 2024-07-10 PROCEDURE — 93798 PHYS/QHP OP CAR RHAB W/ECG: CPT

## 2024-07-12 ENCOUNTER — HOSPITAL ENCOUNTER (OUTPATIENT)
Dept: CARDIAC REHAB | Facility: HOSPITAL | Age: 64
Discharge: STILL ADMITTED OR ADMINISTRATIVE | End: 2024-07-12
Payer: MEDICARE

## 2024-07-12 DIAGNOSIS — Z95.1 S/P 2-VESSEL CORONARY ARTERY BYPASS: ICD-10-CM

## 2024-07-12 PROCEDURE — 93798 PHYS/QHP OP CAR RHAB W/ECG: CPT

## 2024-07-15 ENCOUNTER — HOSPITAL ENCOUNTER (OUTPATIENT)
Dept: CARDIAC REHAB | Facility: HOSPITAL | Age: 64
Discharge: STILL ADMITTED OR ADMINISTRATIVE | End: 2024-07-15
Payer: MEDICARE

## 2024-07-15 DIAGNOSIS — Z95.1 S/P 2-VESSEL CORONARY ARTERY BYPASS: ICD-10-CM

## 2024-07-15 PROCEDURE — 93798 PHYS/QHP OP CAR RHAB W/ECG: CPT

## 2024-07-17 ENCOUNTER — HOSPITAL ENCOUNTER (OUTPATIENT)
Dept: CARDIAC REHAB | Facility: HOSPITAL | Age: 64
Discharge: STILL ADMITTED OR ADMINISTRATIVE | End: 2024-07-17
Payer: MEDICARE

## 2024-07-17 DIAGNOSIS — Z95.1 S/P 2-VESSEL CORONARY ARTERY BYPASS: ICD-10-CM

## 2024-07-17 PROCEDURE — 93798 PHYS/QHP OP CAR RHAB W/ECG: CPT

## 2024-07-19 ENCOUNTER — HOSPITAL ENCOUNTER (OUTPATIENT)
Dept: CARDIAC REHAB | Facility: HOSPITAL | Age: 64
Discharge: STILL ADMITTED OR ADMINISTRATIVE | End: 2024-07-19
Payer: MEDICARE

## 2024-07-19 DIAGNOSIS — Z95.1 S/P 2-VESSEL CORONARY ARTERY BYPASS: ICD-10-CM

## 2024-07-19 PROCEDURE — 93798 PHYS/QHP OP CAR RHAB W/ECG: CPT

## 2024-07-22 ENCOUNTER — HOSPITAL ENCOUNTER (OUTPATIENT)
Dept: CARDIAC REHAB | Facility: HOSPITAL | Age: 64
Discharge: STILL ADMITTED OR ADMINISTRATIVE | End: 2024-07-22
Payer: MEDICARE

## 2024-07-22 DIAGNOSIS — Z95.1 S/P 2-VESSEL CORONARY ARTERY BYPASS: ICD-10-CM

## 2024-07-22 PROCEDURE — 93798 PHYS/QHP OP CAR RHAB W/ECG: CPT

## 2024-07-24 ENCOUNTER — HOSPITAL ENCOUNTER (OUTPATIENT)
Dept: CARDIAC REHAB | Facility: HOSPITAL | Age: 64
Discharge: STILL ADMITTED OR ADMINISTRATIVE | End: 2024-07-24
Payer: MEDICARE

## 2024-07-24 DIAGNOSIS — Z95.1 S/P 2-VESSEL CORONARY ARTERY BYPASS: ICD-10-CM

## 2024-07-24 PROCEDURE — 93798 PHYS/QHP OP CAR RHAB W/ECG: CPT

## 2024-07-26 ENCOUNTER — HOSPITAL ENCOUNTER (OUTPATIENT)
Dept: CARDIAC REHAB | Facility: HOSPITAL | Age: 64
Discharge: STILL ADMITTED OR ADMINISTRATIVE | End: 2024-07-26
Payer: MEDICARE

## 2024-07-26 DIAGNOSIS — Z95.1 S/P 2-VESSEL CORONARY ARTERY BYPASS: ICD-10-CM

## 2024-07-26 PROCEDURE — 93798 PHYS/QHP OP CAR RHAB W/ECG: CPT

## 2024-07-29 ENCOUNTER — HOSPITAL ENCOUNTER (OUTPATIENT)
Dept: CARDIAC REHAB | Facility: HOSPITAL | Age: 64
Discharge: STILL ADMITTED OR ADMINISTRATIVE | End: 2024-07-29
Payer: MEDICARE

## 2024-07-29 DIAGNOSIS — Z95.1 S/P 2-VESSEL CORONARY ARTERY BYPASS: ICD-10-CM

## 2024-07-29 PROCEDURE — 93798 PHYS/QHP OP CAR RHAB W/ECG: CPT

## 2024-07-31 ENCOUNTER — HOSPITAL ENCOUNTER (OUTPATIENT)
Dept: CARDIAC REHAB | Facility: HOSPITAL | Age: 64
Discharge: STILL ADMITTED OR ADMINISTRATIVE | End: 2024-07-31
Payer: MEDICARE

## 2024-07-31 DIAGNOSIS — Z95.1 S/P 2-VESSEL CORONARY ARTERY BYPASS: ICD-10-CM

## 2024-07-31 PROCEDURE — 93798 PHYS/QHP OP CAR RHAB W/ECG: CPT
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
## 2024-08-02 ENCOUNTER — HOSPITAL ENCOUNTER (OUTPATIENT)
Dept: CARDIAC REHAB | Facility: HOSPITAL | Age: 64
Discharge: STILL ADMITTED OR ADMINISTRATIVE | End: 2024-08-02
Payer: MEDICARE

## 2024-08-02 DIAGNOSIS — Z95.1 S/P 2-VESSEL CORONARY ARTERY BYPASS: ICD-10-CM

## 2024-08-02 PROCEDURE — 93798 PHYS/QHP OP CAR RHAB W/ECG: CPT

## 2024-08-05 ENCOUNTER — HOSPITAL ENCOUNTER (OUTPATIENT)
Dept: CARDIAC REHAB | Facility: HOSPITAL | Age: 64
Discharge: STILL ADMITTED OR ADMINISTRATIVE | End: 2024-08-05
Payer: MEDICARE

## 2024-08-05 DIAGNOSIS — Z95.1 S/P 2-VESSEL CORONARY ARTERY BYPASS: ICD-10-CM

## 2024-08-05 PROCEDURE — 93798 PHYS/QHP OP CAR RHAB W/ECG: CPT

## 2024-08-05 ASSESSMENT — ENCOUNTER SYMPTOMS
SORE THROAT: 0
COLOR CHANGE: 0
DOUBLE VISION: 0
WEAKNESS: 0
HEMATOCHEZIA: 0
WEIGHT GAIN: 0
DYSURIA: 0
MEMORY LOSS: 0
HEADACHES: 0
DIAPHORESIS: 0
BACK PAIN: 0
PERSISTENT INFECTIONS: 0
BRUISES/BLEEDS EASILY: 1
NERVOUS/ANXIOUS: 0
FEVER: 0
FALLS: 0
JOINT SWELLING: 0
PALPITATIONS: 0
ALTERED MENTAL STATUS: 0
MYALGIAS: 0
ODYNOPHAGIA: 0
SYNCOPE: 0
HEMOPTYSIS: 0
SHORTNESS OF BREATH: 0
BLACKOUTS: 0
CHILLS: 0
LOSS OF BALANCE: 0
CLAUDICATION: 0
VISUAL HALOS: 0
NAUSEA: 0
WEIGHT LOSS: 0
SPUTUM PRODUCTION: 0
NIGHT SWEATS: 0
POLYDIPSIA: 0
FOCAL WEAKNESS: 0
LIGHT-HEADEDNESS: 0
NUMBNESS: 0
SLEEP DISTURBANCES DUE TO BREATHING: 0
TROUBLE SWALLOWING: 0
UNUSUAL HAIR DISTRIBUTION: 0
SENSORY CHANGE: 0
TREMORS: 0
VOMITING: 0
NEAR-SYNCOPE: 0
POOR WOUND HEALING: 0
PND: 0
SUSPICIOUS LESIONS: 0
DECREASED LIBIDO: 1
SEIZURES: 0
HEMATURIA: 0
DEPRESSION: 0
DECREASED APPETITE: 0
DIARRHEA: 0
ORTHOPNEA: 0
IRREGULAR HEARTBEAT: 0
VISUAL CHANGE: 0
HALLUCINATIONS: 0
DIZZINESS: 0
SNORING: 0

## 2024-08-07 ENCOUNTER — OFFICE VISIT (OUTPATIENT)
Dept: CARDIOLOGY | Facility: CLINIC | Age: 64
End: 2024-08-07
Payer: MEDICARE

## 2024-08-07 ENCOUNTER — HOSPITAL ENCOUNTER (OUTPATIENT)
Dept: CARDIAC REHAB | Facility: HOSPITAL | Age: 64
Discharge: STILL ADMITTED OR ADMINISTRATIVE | End: 2024-08-07
Payer: MEDICARE

## 2024-08-07 VITALS
SYSTOLIC BLOOD PRESSURE: 122 MMHG | HEART RATE: 57 BPM | BODY MASS INDEX: 33.27 KG/M2 | DIASTOLIC BLOOD PRESSURE: 68 MMHG | OXYGEN SATURATION: 96 % | HEIGHT: 66 IN | WEIGHT: 207 LBS

## 2024-08-07 DIAGNOSIS — I25.10 CORONARY ARTERY DISEASE INVOLVING NATIVE CORONARY ARTERY OF NATIVE HEART WITHOUT ANGINA PECTORIS: Primary | ICD-10-CM

## 2024-08-07 DIAGNOSIS — I10 PRIMARY HYPERTENSION: ICD-10-CM

## 2024-08-07 DIAGNOSIS — Z95.1 S/P 2-VESSEL CORONARY ARTERY BYPASS: ICD-10-CM

## 2024-08-07 DIAGNOSIS — D15.1 ATRIAL MYXOMA: ICD-10-CM

## 2024-08-07 DIAGNOSIS — I71.21 ANEURYSM OF ASCENDING AORTA WITHOUT RUPTURE (CMS/HCC): ICD-10-CM

## 2024-08-07 DIAGNOSIS — Q23.81 BICUSPID AORTIC VALVE: ICD-10-CM

## 2024-08-07 DIAGNOSIS — E78.5 DYSLIPIDEMIA: ICD-10-CM

## 2024-08-07 DIAGNOSIS — Z95.1 HX OF CABG: ICD-10-CM

## 2024-08-07 PROCEDURE — 93005 ELECTROCARDIOGRAM TRACING: CPT | Performed by: NURSE PRACTITIONER

## 2024-08-07 PROCEDURE — 99214 OFFICE O/P EST MOD 30 MIN: CPT | Performed by: NURSE PRACTITIONER

## 2024-08-07 PROCEDURE — G0463 HOSPITAL OUTPT CLINIC VISIT: HCPCS | Performed by: NURSE PRACTITIONER

## 2024-08-07 PROCEDURE — 93798 PHYS/QHP OP CAR RHAB W/ECG: CPT

## 2024-08-07 ASSESSMENT — PAIN SCALES - GENERAL: PAINLEVEL: 0-NO PAIN

## 2024-08-09 ENCOUNTER — HOSPITAL ENCOUNTER (OUTPATIENT)
Dept: CARDIAC REHAB | Facility: HOSPITAL | Age: 64
Discharge: STILL ADMITTED OR ADMINISTRATIVE | End: 2024-08-09
Payer: MEDICARE

## 2024-08-09 DIAGNOSIS — Z95.1 S/P 2-VESSEL CORONARY ARTERY BYPASS: ICD-10-CM

## 2024-08-09 PROCEDURE — 93798 PHYS/QHP OP CAR RHAB W/ECG: CPT

## 2024-08-12 ENCOUNTER — HOSPITAL ENCOUNTER (OUTPATIENT)
Dept: CARDIAC REHAB | Facility: HOSPITAL | Age: 64
Discharge: STILL ADMITTED OR ADMINISTRATIVE | End: 2024-08-12
Payer: MEDICARE

## 2024-08-12 DIAGNOSIS — Z95.1 S/P 2-VESSEL CORONARY ARTERY BYPASS: ICD-10-CM

## 2024-08-12 PROCEDURE — 93798 PHYS/QHP OP CAR RHAB W/ECG: CPT

## 2024-08-14 ENCOUNTER — HOSPITAL ENCOUNTER (OUTPATIENT)
Dept: CARDIAC REHAB | Facility: HOSPITAL | Age: 64
Discharge: STILL ADMITTED OR ADMINISTRATIVE | End: 2024-08-14
Payer: MEDICARE

## 2024-08-14 DIAGNOSIS — Z95.1 S/P 2-VESSEL CORONARY ARTERY BYPASS: ICD-10-CM

## 2024-08-14 PROCEDURE — 93798 PHYS/QHP OP CAR RHAB W/ECG: CPT

## 2024-08-16 ENCOUNTER — HOSPITAL ENCOUNTER (OUTPATIENT)
Dept: CARDIAC REHAB | Facility: HOSPITAL | Age: 64
Discharge: STILL ADMITTED OR ADMINISTRATIVE | End: 2024-08-16
Payer: MEDICARE

## 2024-08-16 DIAGNOSIS — Z95.1 S/P 2-VESSEL CORONARY ARTERY BYPASS: ICD-10-CM

## 2024-08-16 PROCEDURE — 93798 PHYS/QHP OP CAR RHAB W/ECG: CPT

## 2024-08-19 ENCOUNTER — HOSPITAL ENCOUNTER (OUTPATIENT)
Dept: CARDIAC REHAB | Facility: HOSPITAL | Age: 64
Discharge: STILL ADMITTED OR ADMINISTRATIVE | End: 2024-08-19
Payer: MEDICARE

## 2024-08-19 DIAGNOSIS — Z95.1 S/P 2-VESSEL CORONARY ARTERY BYPASS: ICD-10-CM

## 2024-08-19 PROCEDURE — 93797 PHYS/QHP OP CAR RHAB WO ECG: CPT

## 2024-08-21 ENCOUNTER — HOSPITAL ENCOUNTER (OUTPATIENT)
Dept: CARDIAC REHAB | Facility: HOSPITAL | Age: 64
Discharge: STILL ADMITTED OR ADMINISTRATIVE | End: 2024-08-21
Payer: MEDICARE

## 2024-08-21 DIAGNOSIS — Z95.1 S/P 2-VESSEL CORONARY ARTERY BYPASS: ICD-10-CM

## 2024-08-21 PROCEDURE — 93797 PHYS/QHP OP CAR RHAB WO ECG: CPT

## 2024-08-23 ENCOUNTER — HOSPITAL ENCOUNTER (OUTPATIENT)
Dept: CARDIAC REHAB | Facility: HOSPITAL | Age: 64
Discharge: STILL ADMITTED OR ADMINISTRATIVE | End: 2024-08-23
Payer: MEDICARE

## 2024-08-23 DIAGNOSIS — Z95.1 S/P 2-VESSEL CORONARY ARTERY BYPASS: ICD-10-CM

## 2024-08-23 PROCEDURE — 93797 PHYS/QHP OP CAR RHAB WO ECG: CPT

## 2024-08-26 ENCOUNTER — HOSPITAL ENCOUNTER (OUTPATIENT)
Dept: CARDIAC REHAB | Facility: HOSPITAL | Age: 64
Discharge: STILL ADMITTED OR ADMINISTRATIVE | End: 2024-08-26
Payer: MEDICARE

## 2024-08-26 DIAGNOSIS — Z95.1 S/P 2-VESSEL CORONARY ARTERY BYPASS: ICD-10-CM

## 2024-08-26 PROCEDURE — 93797 PHYS/QHP OP CAR RHAB WO ECG: CPT

## 2024-08-28 ENCOUNTER — HOSPITAL ENCOUNTER (OUTPATIENT)
Dept: CARDIAC REHAB | Facility: HOSPITAL | Age: 64
Discharge: STILL ADMITTED OR ADMINISTRATIVE | End: 2024-08-28
Payer: MEDICARE

## 2024-08-28 DIAGNOSIS — Z95.1 S/P 2-VESSEL CORONARY ARTERY BYPASS: ICD-10-CM

## 2024-08-28 PROCEDURE — 93797 PHYS/QHP OP CAR RHAB WO ECG: CPT

## 2024-08-30 ENCOUNTER — HOSPITAL ENCOUNTER (OUTPATIENT)
Dept: CARDIAC REHAB | Facility: HOSPITAL | Age: 64
Discharge: STILL ADMITTED OR ADMINISTRATIVE | End: 2024-08-30
Payer: MEDICARE

## 2024-08-30 DIAGNOSIS — Z95.1 S/P 2-VESSEL CORONARY ARTERY BYPASS: ICD-10-CM

## 2024-08-30 PROCEDURE — 93797 PHYS/QHP OP CAR RHAB WO ECG: CPT

## 2024-09-04 PROCEDURE — 93010 ELECTROCARDIOGRAM REPORT: CPT | Performed by: INTERNAL MEDICINE

## 2024-09-06 ENCOUNTER — HOSPITAL ENCOUNTER (OUTPATIENT)
Dept: CARDIAC REHAB | Facility: HOSPITAL | Age: 64
Discharge: STILL ADMITTED OR ADMINISTRATIVE | End: 2024-09-06
Payer: MEDICARE

## 2024-09-06 DIAGNOSIS — Z95.1 S/P 2-VESSEL CORONARY ARTERY BYPASS: ICD-10-CM

## 2024-09-06 PROCEDURE — 93797 PHYS/QHP OP CAR RHAB WO ECG: CPT

## 2024-09-09 ENCOUNTER — HOSPITAL ENCOUNTER (OUTPATIENT)
Dept: CARDIAC REHAB | Facility: HOSPITAL | Age: 64
Discharge: STILL ADMITTED OR ADMINISTRATIVE | End: 2024-09-09
Payer: MEDICARE

## 2024-09-09 DIAGNOSIS — Z95.1 S/P 2-VESSEL CORONARY ARTERY BYPASS: ICD-10-CM

## 2024-09-09 PROCEDURE — 93797 PHYS/QHP OP CAR RHAB WO ECG: CPT

## 2024-09-11 ENCOUNTER — HOSPITAL ENCOUNTER (OUTPATIENT)
Dept: CARDIAC REHAB | Facility: HOSPITAL | Age: 64
Discharge: STILL ADMITTED OR ADMINISTRATIVE | End: 2024-09-11
Payer: MEDICARE

## 2024-09-11 DIAGNOSIS — Z95.1 S/P 2-VESSEL CORONARY ARTERY BYPASS: ICD-10-CM

## 2024-09-11 PROCEDURE — 93797 PHYS/QHP OP CAR RHAB WO ECG: CPT

## 2024-09-13 ENCOUNTER — HOSPITAL ENCOUNTER (OUTPATIENT)
Dept: CARDIAC REHAB | Facility: HOSPITAL | Age: 64
Discharge: STILL ADMITTED OR ADMINISTRATIVE | End: 2024-09-13
Payer: MEDICARE

## 2024-09-13 DIAGNOSIS — Z95.1 S/P 2-VESSEL CORONARY ARTERY BYPASS: ICD-10-CM

## 2024-09-13 PROCEDURE — 93797 PHYS/QHP OP CAR RHAB WO ECG: CPT

## 2024-09-16 ENCOUNTER — HOSPITAL ENCOUNTER (OUTPATIENT)
Dept: CARDIAC REHAB | Facility: HOSPITAL | Age: 64
Discharge: STILL ADMITTED OR ADMINISTRATIVE | End: 2024-09-16
Payer: MEDICARE

## 2024-09-16 DIAGNOSIS — Z95.1 S/P 2-VESSEL CORONARY ARTERY BYPASS: ICD-10-CM

## 2024-09-16 PROCEDURE — 93797 PHYS/QHP OP CAR RHAB WO ECG: CPT

## 2024-09-18 ENCOUNTER — HOSPITAL ENCOUNTER (OUTPATIENT)
Dept: CARDIAC REHAB | Facility: HOSPITAL | Age: 64
Discharge: STILL ADMITTED OR ADMINISTRATIVE | End: 2024-09-18
Payer: MEDICARE

## 2024-09-18 DIAGNOSIS — Z95.1 S/P 2-VESSEL CORONARY ARTERY BYPASS: ICD-10-CM

## 2024-09-18 PROCEDURE — 93797 PHYS/QHP OP CAR RHAB WO ECG: CPT

## 2024-10-09 NOTE — ANESTHESIA POSTPROCEDURE EVALUATION
If vertigo is getting better, yes he can do his COVID and flu vaccine together at the pharmacy   Patient: Jamir Acevedo    Procedure Summary       Date: 06/13/24 Room / Location: [facility] [place] OR  / [facility] [place] OR    Anesthesia Start: 0701 Anesthesia Stop: 1611    Procedure: CORONARY ARTERY BYPASS GRAFT X 2 UTILIZING LEFT INTERNAL MAMMARY, RIGHT SAPHENOUS VEIN VIA ENDOSCOPIC VEIN HARVEST, ATRIAL MYXOMA RESECTION WITH BOVINE ATRIAL PATCH SEPTOPLASTY, PRIMARY RECONSTRUCTION OF LEFT ATRIAL DOME, ASCENDING AORTIC ANEURYSM REPLACEMENT USING [id number] HEMASHIELD GRAFT, CARDIOPULMONARY BYPASS WITH DEEP HYPOTHERMIC CIRCULATORY ARREST, TRANSESOPHAGEAL ECHOCARDIOGRAM, STERNAL PLATING, PLACEMENT OF PREVENA INCISION MANAGEMENT SYSTEM (Chest) Diagnosis:       Atrial myxoma      Coronary artery disease involving native coronary artery of native heart with angina pectoris (CMS/HCC)      (Atrial myxoma [D15.1])      (Coronary artery disease involving native coronary artery of native heart with angina pectoris (CMS/HCC) [I25.119])    Surgeons: Cash Huynh MD Responsible Provider: Eliceo Pantoja MD    Anesthesia Type: general ASA Status: 4            Anesthesia Type: general    Last vitals   Vitals Value Taken Time   BP     Temp 32.5 °C (90.5 °F) 06/13/24 1654   Pulse 70 06/13/24 1610   Resp 0 06/13/24 1648   SpO2 96 % 06/13/24 1610   Pain Score     Vitals shown include unfiled device data.    Anesthesia Post Evaluation    Patient location during evaluation: ICU  Patient participation: complete - patient cannot participate  Level of consciousness: obtunded/minimal responses  Pain management: adequate  Airway patency: patent  Cardiovascular status: acceptable and hemodynamically stable  Respiratory status: acceptable, intubated and ventilator  Hydration status: acceptable       There were no known notable events for this encounter.    Cosmetic?  This procedure is not cosmetic.

## 2024-11-13 ENCOUNTER — OFFICE VISIT (OUTPATIENT)
Dept: URGENT CARE | Facility: CLINIC | Age: 64
End: 2024-11-13
Payer: MEDICARE

## 2024-11-13 VITALS
TEMPERATURE: 98.1 F | HEART RATE: 54 BPM | DIASTOLIC BLOOD PRESSURE: 73 MMHG | RESPIRATION RATE: 16 BRPM | BODY MASS INDEX: 33.09 KG/M2 | OXYGEN SATURATION: 96 % | WEIGHT: 205 LBS | SYSTOLIC BLOOD PRESSURE: 148 MMHG

## 2024-11-13 DIAGNOSIS — H00.021 HORDEOLUM INTERNUM OF RIGHT UPPER EYELID: Primary | ICD-10-CM

## 2024-11-13 PROCEDURE — 99213 OFFICE O/P EST LOW 20 MIN: CPT | Mod: GF | Performed by: PHYSICIAN ASSISTANT

## 2024-11-13 PROCEDURE — G0463 HOSPITAL OUTPT CLINIC VISIT: HCPCS

## 2024-11-13 RX ORDER — ERYTHROMYCIN 5 MG/G
1 OINTMENT OPHTHALMIC EVERY 6 HOURS
Qty: 20 G | Refills: 0 | Status: SHIPPED | OUTPATIENT
Start: 2024-11-13 | End: 2024-11-18

## 2024-11-13 NOTE — PROGRESS NOTES
Subjective      History of Present Illness    The patient presents with a two-day history of a swollen, itchy right eye. She reports that the swelling is significant enough to interfere with her vision, and the eyelid is sore and tender. The left eye is also slightly itchy and watery. She denies any discharge from the eye, but notes that the right eye is watery and warm. She has been using dry eye drops and hot compresses, which have provided some relief. She has no recent history of cold symptoms, runny nose, ear pain, or sore throat. She does have a history of seasonal allergies, which typically do not affect her eyes. She does not wear contact lenses and had an eye exam one month ago. She has not used any new eye products recently.            Objective      /73   Pulse 54   Temp 36.7 °C (98.1 °F) (Temporal)   Resp 16   Wt 93 kg (205 lb)   SpO2 96%   BMI 33.09 kg/m²            Physical Exam       Physical Exam     Physical Exam    HEENT: Right eye sclera clear, no conjunctivitis, tender upper eyelid lump to midline lid, consistent with internal stye, no ocular discharge, watery and itchy eye noted. Left eye wnl. PERRLA.              Assessment/Plan      Diagnoses and all orders for this visit:    Hordeolum internum of right upper eyelid  -     erythromycin (ROMYCIN) ophthalmic ointment; Apply 1 Application to right eye every 6 (six) hours for 5 days         Assessment/Plan     Stye (Internal Hordeolum)  Swelling and itching of the right upper eyelid for 2 days. No discharge, but watery. No recent illness or new products. No visual disturbances.  -Continue warm compresses and gentle massage.  -Prescribe Erythromycin ointment to apply along the lash line 3-4 times a day.  -If symptoms persist beyond two weeks, refer to an eye doctor for further eval and treatment options.     Seasonal Allergies  Chronic postnasal drip. No current exacerbation.  -Continue current management.           No follow-ups on  file.         Patti Zambrano PA-C     November 13, 2024 12:36 PM

## 2024-11-13 NOTE — PATIENT INSTRUCTIONS
*Use frequent warm compresses to right eyelid with gentle massage to help the blocked gland heal  *You may use erythromycin ointment to treat any bacterial etiology of this   *Recommend following up with your optometrist versus ophthalmologist if this has not resolved within 2 weeks as this can have long-term consequences.

## 2025-03-27 ENCOUNTER — CONSULT (OUTPATIENT)
Dept: PODIATRY | Facility: CLINIC | Age: 65
End: 2025-03-27
Payer: MEDICARE

## 2025-03-27 VITALS
TEMPERATURE: 97.9 F | RESPIRATION RATE: 16 BRPM | WEIGHT: 221 LBS | HEART RATE: 54 BPM | BODY MASS INDEX: 35.52 KG/M2 | HEIGHT: 66 IN | DIASTOLIC BLOOD PRESSURE: 86 MMHG | SYSTOLIC BLOOD PRESSURE: 150 MMHG | OXYGEN SATURATION: 96 %

## 2025-03-27 DIAGNOSIS — M79.675 PAIN OF TOE OF LEFT FOOT: ICD-10-CM

## 2025-03-27 DIAGNOSIS — L60.0 ONYCHOCRYPTOSIS: Primary | ICD-10-CM

## 2025-03-27 DIAGNOSIS — L60.0 INGROWN TOENAIL OF LEFT FOOT: ICD-10-CM

## 2025-03-27 PROCEDURE — G0463 HOSPITAL OUTPT CLINIC VISIT: HCPCS | Performed by: PODIATRIST

## 2025-03-27 ASSESSMENT — ENCOUNTER SYMPTOMS
BRUISES/BLEEDS EASILY: 1
VOMITING: 0
WOUND: 0
FEVER: 0
MYALGIAS: 0
CHEST TIGHTNESS: 0
SHORTNESS OF BREATH: 0
ARTHRALGIAS: 0

## 2025-03-27 ASSESSMENT — PAIN SCALES - GENERAL: PAINLEVEL_OUTOF10: 0-NO PAIN
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
## 2025-08-04 ENCOUNTER — OFFICE VISIT (OUTPATIENT)
Dept: PODIATRY | Facility: CLINIC | Age: 65
End: 2025-08-04
Payer: MEDICARE

## 2025-08-04 VITALS
HEART RATE: 58 BPM | DIASTOLIC BLOOD PRESSURE: 92 MMHG | OXYGEN SATURATION: 97 % | BODY MASS INDEX: 35.36 KG/M2 | HEIGHT: 66 IN | WEIGHT: 220 LBS | SYSTOLIC BLOOD PRESSURE: 147 MMHG | RESPIRATION RATE: 18 BRPM

## 2025-08-04 DIAGNOSIS — L60.0 ONYCHOCRYPTOSIS: ICD-10-CM

## 2025-08-04 DIAGNOSIS — L60.0 INGROWN TOENAIL OF LEFT FOOT: Primary | ICD-10-CM

## 2025-08-04 DIAGNOSIS — M79.675 PAIN OF TOE OF LEFT FOOT: ICD-10-CM

## 2025-08-04 PROCEDURE — G0463 HOSPITAL OUTPT CLINIC VISIT: HCPCS | Performed by: PODIATRIST

## 2025-08-04 PROCEDURE — 2500000200 HC RX 250 WO HCPCS: Mod: NCMED,NC | Performed by: PODIATRIST

## 2025-08-04 PROCEDURE — 11750 EXCISION NAIL&NAIL MATRIX: CPT | Performed by: PODIATRIST

## 2025-08-04 PROCEDURE — 11750 EXCISION NAIL&NAIL MATRIX: CPT | Mod: TA | Performed by: PODIATRIST

## 2025-08-04 PROCEDURE — 99213 OFFICE O/P EST LOW 20 MIN: CPT | Mod: 25 | Performed by: PODIATRIST

## 2025-08-04 RX ADMIN — LIDOCAINE HYDROCHLORIDE: 20 INJECTION, SOLUTION INFILTRATION; PERINEURAL at 16:57

## 2025-08-04 ASSESSMENT — PAIN SCALES - GENERAL: PAINLEVEL_OUTOF10: 0-NO PAIN

## 2025-08-06 ASSESSMENT — ENCOUNTER SYMPTOMS
VOMITING: 0
MYALGIAS: 0
UNUSUAL HAIR DISTRIBUTION: 0
ALTERED MENTAL STATUS: 0
SUSPICIOUS LESIONS: 0
VISUAL CHANGE: 0
HEMATURIA: 0
SEIZURES: 0
BACK PAIN: 0
BLACKOUTS: 0
IRREGULAR HEARTBEAT: 0
NIGHT SWEATS: 0
DIAPHORESIS: 0
DOUBLE VISION: 0
HEMOPTYSIS: 0
WEAKNESS: 0
SHORTNESS OF BREATH: 0
POLYDIPSIA: 0
DIZZINESS: 1
DECREASED APPETITE: 0
TREMORS: 0
ORTHOPNEA: 0
NEAR-SYNCOPE: 0
LOSS OF BALANCE: 1
FALLS: 0
DIARRHEA: 0
HALLUCINATIONS: 0
SORE THROAT: 0
HEMATOCHEZIA: 0
MEMORY LOSS: 0
DYSURIA: 0
PERSISTENT INFECTIONS: 0
SNORING: 0
NUMBNESS: 0
SPUTUM PRODUCTION: 0
BRUISES/BLEEDS EASILY: 1
FEVER: 0
CLAUDICATION: 0
DEPRESSION: 0
NERVOUS/ANXIOUS: 0
ODYNOPHAGIA: 0
SENSORY CHANGE: 0
TROUBLE SWALLOWING: 0
COLOR CHANGE: 0
DECREASED LIBIDO: 1
FOCAL WEAKNESS: 0
LIGHT-HEADEDNESS: 1
CHILLS: 0
WEIGHT GAIN: 1
SLEEP DISTURBANCES DUE TO BREATHING: 0
SYNCOPE: 0
JOINT SWELLING: 0
PND: 0
VISUAL HALOS: 0
POOR WOUND HEALING: 0
PALPITATIONS: 0
NAUSEA: 0
WEIGHT LOSS: 0
HEADACHES: 0

## 2025-08-07 ENCOUNTER — OFFICE VISIT (OUTPATIENT)
Dept: CARDIOLOGY | Facility: CLINIC | Age: 65
End: 2025-08-07
Payer: MEDICARE

## 2025-08-07 VITALS
HEIGHT: 66 IN | WEIGHT: 225 LBS | RESPIRATION RATE: 18 BRPM | OXYGEN SATURATION: 98 % | BODY MASS INDEX: 36.16 KG/M2 | SYSTOLIC BLOOD PRESSURE: 120 MMHG | DIASTOLIC BLOOD PRESSURE: 80 MMHG | HEART RATE: 47 BPM

## 2025-08-07 DIAGNOSIS — I25.10 CORONARY ARTERY DISEASE INVOLVING NATIVE HEART WITHOUT ANGINA PECTORIS, UNSPECIFIED VESSEL OR LESION TYPE: ICD-10-CM

## 2025-08-07 DIAGNOSIS — I35.0 NONRHEUMATIC AORTIC VALVE STENOSIS: Primary | ICD-10-CM

## 2025-08-07 PROCEDURE — 93005 ELECTROCARDIOGRAM TRACING: CPT | Performed by: STUDENT IN AN ORGANIZED HEALTH CARE EDUCATION/TRAINING PROGRAM

## 2025-08-07 PROCEDURE — G0463 HOSPITAL OUTPT CLINIC VISIT: HCPCS | Performed by: STUDENT IN AN ORGANIZED HEALTH CARE EDUCATION/TRAINING PROGRAM

## 2025-08-07 RX ORDER — METOPROLOL TARTRATE 25 MG/1
12.5 TABLET, FILM COATED ORAL 2 TIMES DAILY
Qty: 90 TABLET | Refills: 3 | Status: SHIPPED | OUTPATIENT
Start: 2025-08-07 | End: 2026-08-07

## 2025-08-07 ASSESSMENT — ENCOUNTER SYMPTOMS
NUMBNESS: 0
DOUBLE VISION: 0
DEPRESSION: 0
ORTHOPNEA: 0
NEAR-SYNCOPE: 0
HEMOPTYSIS: 0
CLAUDICATION: 0
MYALGIAS: 0
HEADACHES: 0
WEIGHT LOSS: 0
SPUTUM PRODUCTION: 0
MEMORY LOSS: 0
LOSS OF BALANCE: 1
NERVOUS/ANXIOUS: 0
IRREGULAR HEARTBEAT: 0
UNUSUAL HAIR DISTRIBUTION: 0
LIGHT-HEADEDNESS: 1
HALLUCINATIONS: 0
PERSISTENT INFECTIONS: 0
TROUBLE SWALLOWING: 0
DYSURIA: 0
NAUSEA: 0
SORE THROAT: 0
TREMORS: 0
HEMATOCHEZIA: 0
CHILLS: 0
VOMITING: 0
BRUISES/BLEEDS EASILY: 1
SHORTNESS OF BREATH: 0
SUSPICIOUS LESIONS: 0
DECREASED LIBIDO: 1
DIAPHORESIS: 0
ALTERED MENTAL STATUS: 0
POLYDIPSIA: 0
COLOR CHANGE: 0
BACK PAIN: 0
SYNCOPE: 0
VISUAL HALOS: 0
SLEEP DISTURBANCES DUE TO BREATHING: 0
SEIZURES: 0
WEIGHT GAIN: 1
VISUAL CHANGE: 0
PALPITATIONS: 0
BLACKOUTS: 0
DECREASED APPETITE: 0
SENSORY CHANGE: 0
PND: 0
FEVER: 0
NIGHT SWEATS: 0
ODYNOPHAGIA: 0
FALLS: 0
POOR WOUND HEALING: 0
DIZZINESS: 1
DIARRHEA: 0
HEMATURIA: 0
JOINT SWELLING: 0
SNORING: 0
WEAKNESS: 0
FOCAL WEAKNESS: 0

## 2025-08-07 ASSESSMENT — PAIN SCALES - GENERAL
PAINLEVEL_OUTOF10: 0-NO PAIN

## 2025-08-18 ENCOUNTER — CLINICAL SUPPORT (OUTPATIENT)
Dept: CARDIOLOGY | Facility: CLINIC | Age: 65
End: 2025-08-18
Payer: MEDICARE

## 2025-08-18 ENCOUNTER — OFFICE VISIT (OUTPATIENT)
Dept: PODIATRY | Facility: CLINIC | Age: 65
End: 2025-08-18
Payer: MEDICARE

## 2025-08-18 VITALS — DIASTOLIC BLOOD PRESSURE: 80 MMHG | HEART RATE: 54 BPM | SYSTOLIC BLOOD PRESSURE: 158 MMHG | OXYGEN SATURATION: 96 %

## 2025-08-18 VITALS
DIASTOLIC BLOOD PRESSURE: 82 MMHG | OXYGEN SATURATION: 98 % | SYSTOLIC BLOOD PRESSURE: 142 MMHG | HEART RATE: 53 BPM | HEIGHT: 66 IN | WEIGHT: 225 LBS | BODY MASS INDEX: 36.16 KG/M2

## 2025-08-18 DIAGNOSIS — L60.0 INGROWN TOENAIL OF LEFT FOOT: Primary | ICD-10-CM

## 2025-08-18 DIAGNOSIS — I10 PRIMARY HYPERTENSION: Primary | ICD-10-CM

## 2025-08-18 PROCEDURE — G0463 HOSPITAL OUTPT CLINIC VISIT: HCPCS | Performed by: PODIATRIST

## 2025-08-18 PROCEDURE — 99213 OFFICE O/P EST LOW 20 MIN: CPT | Performed by: PODIATRIST

## 2025-08-18 ASSESSMENT — PAIN SCALES - GENERAL
PAINLEVEL_OUTOF10: 0-NO PAIN
PAINLEVEL_OUTOF10: 0-NO PAIN

## 2025-08-28 ENCOUNTER — ANCILLARY PROCEDURE (OUTPATIENT)
Dept: CARDIOLOGY | Facility: CLINIC | Age: 65
End: 2025-08-28
Payer: MEDICARE

## 2025-08-28 VITALS — SYSTOLIC BLOOD PRESSURE: 168 MMHG | DIASTOLIC BLOOD PRESSURE: 80 MMHG

## 2025-08-28 DIAGNOSIS — I35.0 NONRHEUMATIC AORTIC VALVE STENOSIS: ICD-10-CM

## 2025-08-28 PROCEDURE — 93306 TTE W/DOPPLER COMPLETE: CPT

## 2025-08-29 LAB
ASCENDING AORTA: 2.91 CM
AV LVOT PEAK GRADIENT: 3.1 MMHG
AV MEAN PRESS GRAD SYS DOP V1V2: 10 MMHG
AV PEAK GRADIENT: 18 MMHG
AV VMAX SYS DOP: 2.1 M/S
DOP CALC AO VTI: 57 CM
DOP CALC LVOT DIAMETER: 2.18 CM
DOP CALC LVOT STROKE VOLUME: 94 CM3
DOP CALC MV VTI: 38.97 CM
DOP CALC RVOT PEAK VEL: 0.7 M/S
DOP CALCLVOT PEAK VEL VTI: 25.1 CM
E/A RATIO: 3.7
E/E' RATIO (AVERAGE): 13.9
E/E' RATIO: 16.7
ERAP: 8 MMHG
INTERVENTRICULAR SEPTUM: 1 CM (ref 0.6–1.1)
IVC PROX: 1.9 CM
LA AREA A4C SYSTOLE: 58.5 CM3
LEFT ATRIUM SIZE: 3.69 CM
LEFT ATRIUM VOLUME INDEX: 27 ML/M2
LEFT ATRIUM VOLUME: 56.5 CM3
LEFT INTERNAL DIMENSION IN SYSTOLE: 2.8 CM (ref 2.1–4)
LEFT VENTRICLE DIASTOLIC VOLUME INDEX: 66.2 ML/M2
LEFT VENTRICLE DIASTOLIC VOLUME: 139 CM3
LEFT VENTRICLE SYSTOLIC VOLUME INDEX: 28.7 ML/M2
LEFT VENTRICLE SYSTOLIC VOLUME: 60 CM3
LEFT VENTRICULAR INTERNAL DIMENSION IN DIASTOLE: 4 CM (ref 3.5–6)
LV EF BIPLANE MOD: 57 %
LVAD-AP2: 36.9 CM2
MV DEC SLOPE: 6510 MM/S2
MV DT: 141 MS
MV MEAN GRADIENT: 1.2 MMHG
MV PEAK A VEL: 32.5 CM/S
MV PEAK E VEL: 120 CM/S
MV PEAK GRADIENT: 6 MMHG
MV STENOSIS PRESSURE HALF TIME: 55 MS
MV VALVE AREA P 1/2 METHOD: 4 CM2
MV VMAX: 120 CM/S
MVA (VTI): 2.4 CM2
POSTERIOR WALL: 0.9 CM (ref 0.6–1.1)
PULM VEIN S/D RATIO: 0.6
PV PEAK D VEL: 99 CM/S
PV PEAK GRADIENT: 5.11 MMHG
PV PEAK S VEL: 55 CM/S
PV VMAX: 1.13 M/S
RA AREA: 9.6 CM2
RH ABDOMINAL AORTA: 2.1 CM
RH CV ECHO AV VALVE AREA VEL: 1.6 CM2
RH CV ECHO AV VALVE AREA VTI: 1.7 CM2
RH LVOT PEAK VELOCITY REST: 0.9 M/S
RIGHT VENTRICULAR INTERNAL DIMENSION IN DIASTOLE: 4.2 CM
RV AP4 BASE: 3.9 CM
S': 9.3 CM/S
TDI: 7.2 CM/S
TDILATERAL: 10.9 CM/S
TR MAX PG: 18.49 MMHG
TRICUSPID ANNULAR PLANE SYSTOLIC EXCURSION: 2 CM
TRICUSPID VALVE PEAK REGURGITATION VELOCITY: 2.2 M/S
TV REST PULMONARY ARTERY PRESSURE: 27 MMHG

## 2025-08-29 PROCEDURE — 93306 TTE W/DOPPLER COMPLETE: CPT | Mod: 26 | Performed by: INTERNAL MEDICINE

## (undated) DEVICE — GLOVE BIOGEL PI IND 6.5 SURGICAL

## (undated) DEVICE — BAG DECANTER II

## (undated) DEVICE — TUBING SUCTION 3/16"X10'

## (undated) DEVICE — WATER STL IRR 500ML BTL USP PLASTIC POUR BOTTLE

## (undated) DEVICE — COVER MAYO STAND XLG REINFORCE 30 1/2 X 57

## (undated) DEVICE — TROCAR BLADED 11 X 100MM Z THREAD

## (undated) DEVICE — SHUNT SENSOR USE W CDI SYSTEM

## (undated) DEVICE — SPONGE LAP 18X18" W LOOP STL

## (undated) DEVICE — SYRINGE 5CC NO NEEDLE LUER LOCK TIP

## (undated) DEVICE — SUTURE VICRYL 3-0 CT-1 36" UNDYED

## (undated) DEVICE — PATCH DEFIB LIFELINK CARDIOVERSION

## (undated) DEVICE — POUCH ENDO CATCH 10MM

## (undated) DEVICE — SURGICEL ABSORBABLE HEMOSTAT 4 X 8

## (undated) DEVICE — NEEDLE INSUFFLATION 120M C2201

## (undated) DEVICE — COVER SITE RITE WITH GEL ULTRASOUND PROBE STL

## (undated) DEVICE — TOURNIQUET VASCULAR

## (undated) DEVICE — TROCAR BLADED 5 X 100MM Z THREAD

## (undated) DEVICE — TRAY LAP CHOLE CUSTOM RCRH CUSTOM PACK

## (undated) DEVICE — SPONGE REVITAL-OX ENZYMATIC

## (undated) DEVICE — KIT ROOM TURNOVER STANDARD 01C INFECTION CONTROL SYSTEM

## (undated) DEVICE — APPLIER CLIP MED/LRG TITANIUM

## (undated) DEVICE — GLOVE SURG SZ 8.5 GREEN W/ ALOE VERA

## (undated) DEVICE — GLOVE SENSICARE 7.5 SURG

## (undated) DEVICE — PREP SKIN CHLORAPREP ORNG 26ML STL

## (undated) DEVICE — KIT VASOVIEW HEMOPRO 2 MIN 5EA ENDOSCOPIC VESSEL HARVESTING

## (undated) DEVICE — DRAIN BLAKE 24F ROUND HUBLESS SILICONE

## (undated) DEVICE — GEL AQUASONIC STL 100 20GM

## (undated) DEVICE — CONNECTOR BLAKE CARDIO 1 WAY 1 WAY CONNECTOR

## (undated) DEVICE — TIP CAUTERY 2.5" 0012 NONSTICK

## (undated) DEVICE — MISTER BLOWER CO2  AXIUS 16.6CML

## (undated) DEVICE — SUMP PERICARDIAL INTRACARDIAC 12" 20F

## (undated) DEVICE — CLIP LIGA LG HORIZON ORANGE

## (undated) DEVICE — GLOVE NEOLON 2G 6.5 SURG

## (undated) DEVICE — PLUG CATH URINARY W CAP

## (undated) DEVICE — CAUTERY 2IN FINE TIP ACU DISPO

## (undated) DEVICE — DIFFUSER GAS TEMED

## (undated) DEVICE — WIRE GUIDE 35 X 260 ROSEN THSCF-35-260-1.5-ROSEN

## (undated) DEVICE — SUTURE PROLENE 4-0 SH 48 INCH

## (undated) DEVICE — SUTURE PROLENE 5-0 C-1 36" BLUE DA

## (undated) DEVICE — TUBE SET PNEUMOCLEAR SMOKE EVACUATION HIGH FLOW

## (undated) DEVICE — GLOVE SENSICARE 8.0 SURG

## (undated) DEVICE — DRAPE HYH LRG 76 X 55 INCHES

## (undated) DEVICE — CONNECTOR BLAKE CARDIO 2:1 2 WAY CONNECTOR

## (undated) DEVICE — GLOVE GAMMEX SZ 7

## (undated) DEVICE — SUTURE SILK 2-0 30" TIE

## (undated) DEVICE — CANNULA AORTIC ROOT 5.5" 9F STD TIP & FLOGUARD INTRO

## (undated) DEVICE — BANDAGE ELAST VELCRO 4"X5' STL TETRA

## (undated) DEVICE — PACK OPEN HEART PERFUSION CUSTOM INSPIRE 8FD PTS

## (undated) DEVICE — SPONGE SURGIFOAM SZ 100 8.5CM X 12CM X 10MM GELATIN

## (undated) DEVICE — DRAPE TOWEL ADHESIVE 19X30"STL TIBURON UTILITY TRACH

## (undated) DEVICE — KIT SHEATH GLIDE 6/5F 16CM

## (undated) DEVICE — GLOVE SENSICARE MICRO PF 6.0

## (undated) DEVICE — SUTURE MONOCRYL 4-0 PS-2 27" 3/8 CIRCLE NEEDLE REVERSE CUT

## (undated) DEVICE — PREP SKIN DRY W/GLOVES TRAY

## (undated) DEVICE — GLOVE SENSICARE SLT 8.0 SURG

## (undated) DEVICE — GLOVE SENSICARE MICRO PF 7.0

## (undated) DEVICE — CATH VENT LF HEART 16F

## (undated) DEVICE — KIT ANTI-FOG

## (undated) DEVICE — TRAY OPEN HEART PART 1 CUSTOM RCH

## (undated) DEVICE — PUNCH AORTIC 4MM LONG HANDLE

## (undated) DEVICE — STAPLER SKIN WIDE 35

## (undated) DEVICE — CATH DXTERITY 5F JR40 100CM

## (undated) DEVICE — PENCIL ELECTROSURGICAL HAND-SWITCHING @

## (undated) DEVICE — ADAPTER VENTING Y TYPE 7.5

## (undated) DEVICE — CLIP LIGA MED/LG HORIZON GREEN

## (undated) DEVICE — KIT HAND CONTROL DELIVERY AT-X65 HC COEX

## (undated) DEVICE — SUTURE VICRYL 0 UR6 27" VIOLET

## (undated) DEVICE — CANNULA VENOUS 28F SGL STAGE W/ RT ANGLE METAL TIP 8.7MM

## (undated) DEVICE — SCISSOR TIP METZENBAUM 5MM CRV DISP

## (undated) DEVICE — ELECTRODE L HOOK 5MM X 32CM

## (undated) DEVICE — NEEDLE HYPO 30G 3/4" A MONOJECT NONSAFETY

## (undated) DEVICE — GLOVE SENSICARE 7.0 SURG

## (undated) DEVICE — SPONGE TONSIL LG 1" STL DBL STRUNG 5/PK 100 PK/CS

## (undated) DEVICE — TUBING 24" PRESSURE ART M/F

## (undated) DEVICE — BANDAGE ELASTIC 6" STL VELCLOS ACE BANDAGE VELCLOSE

## (undated) DEVICE — TRAY OPEN HEART 2 CUSTOM RCRH CUSTOM PACK

## (undated) DEVICE — BANDAGE ELASTIC VELCLOSE 4"5YD ACE WRAP

## (undated) DEVICE — PATCH FIBRIN SEALANT LG 9.5 X 4.8CM

## (undated) DEVICE — GOWN SURG 3X XLNG IMPERVIOUS SMARTSLEEVE

## (undated) DEVICE — TUBING 72" PRESSURE ART M/M

## (undated) DEVICE — TRAP MUCOUS SPECIMEN 40CC ST + CONTAINS LATEX

## (undated) DEVICE — SWAB BARRIER FILM NO STING 1ML CAVILON

## (undated) DEVICE — BAND TR STD W INFLATOR

## (undated) DEVICE — SOL SOD CHL IRR .9% 1000ML BTL USP PLASTIC POUR BOTTLE

## (undated) DEVICE — CONTAINER SPECIMEN 4OZ STERILE SCREW TOP

## (undated) DEVICE — ADHESIVE EXOFIN TISSUE 1.0ML TOPICAL SKIN ADHESIVE PHARMACY

## (undated) DEVICE — SUTURE PROLENE 5-0 RB-1 36" BLUE DA

## (undated) DEVICE — IRRIGATOR SUCTION STR HANDLE ELECTROSURGICAL W/HAND CONTROL

## (undated) DEVICE — BRA POST SURGICAL DALE XLG FITS B-D 38-44

## (undated) DEVICE — SOL ACD FORMULA A 500ML

## (undated) DEVICE — SUTURE VICRYL 4-0 PS-2 18IN UNDYED

## (undated) DEVICE — CATH IV CLOSED 24GX19MM STRAIGHT STERILE INTROCAN SAFETY

## (undated) DEVICE — CANNULA CORONARY 14FR ARTERY OSTAIL BASKET TIP

## (undated) DEVICE — DRAPE SLUSH MACHINE

## (undated) DEVICE — TUBING SMART 10' X 3/8 X 3/32

## (undated) DEVICE — SUTURE PROLENE 4-0 SH 36" BLUE DA

## (undated) DEVICE — CANNULA 22 FR ARTERIAL W/GUIDEWIRE EOPA

## (undated) DEVICE — CATH DIAG 5F 4.0 TIGER 100CM

## (undated) DEVICE — MOUTHPIECE E-Z GUARD ADLT H336

## (undated) DEVICE — PAD BOVIE ADULT 9' CORD REM ELECTRODE PATIENT RETURN

## (undated) DEVICE — SUTURE ETHIBOND 0 CT1 CX21D CR8 18" GREEN

## (undated) DEVICE — SUTURE MONOCRYL 4-0 PS-2 18" UNDYED

## (undated) DEVICE — DRAIN CHEST PLEUR EVAC SAHARA DRY

## (undated) DEVICE — LINE ASPIRATION ANTICOAG XTRA ASPIRATION & ANTICOAGULATION

## (undated) DEVICE — TROCAR SLEEVE 5 X 100MM

## (undated) DEVICE — KIT LAP SCOPE WARMER D-HELP

## (undated) DEVICE — CLIP LIGA SM HORIZON 4PK 1205 HEARTS 24 CLIPS IN PK

## (undated) DEVICE — BANDAGE ELASTIC VELCLOSE 6"5YD ACE WRAP

## (undated) DEVICE — NEEDLE SPINAL 22G 3 1/2

## (undated) DEVICE — INSERT SOFTJAW FOGARTY SOFT86 REPLACES THE CV5076

## (undated) DEVICE — SPONGE LAP 4X18" PREWASHED STL

## (undated) DEVICE — SET PROCEDURE 225MM 120 MICRON W/BOTTOM OUTLET RESERVOIR XTRA

## (undated) DEVICE — SEALANT 4ML SURGICAL PREVELEAK

## (undated) DEVICE — BLANKET CARDIAC WARMING 66.9X52.4" STL FORCED AIR

## (undated) DEVICE — GOWN SURGICAL LEVEL 4 W/TOWEL XL AERO CHROME

## (undated) DEVICE — GUIDE WIRE WHOLEY .035INX260CM INTERMEDIATE MODIFIED J/SHAPEABLE 0005281

## (undated) DEVICE — SUTURE VICRYL 2 TP-1 MS4 27IN VIOLET

## (undated) DEVICE — SUTURE PROLENE 4-0 RB-1 36" BLUE DA

## (undated) DEVICE — TUBING INSUFFLATION DUAL CONNECTOR

## (undated) DEVICE — INSERT RETRACTOR OCTOPUS

## (undated) DEVICE — GOWN SURGICAL AERO CHROME W/TOWEL LG LEVEL 4 STL

## (undated) DEVICE — HEMOSTAT SNOW 4X4 INCH SURGICEL ABSORB

## (undated) DEVICE — CONNECTOR 3/8 X 3/8 STR

## (undated) DEVICE — CANNULA RETROGRADE PVC 15F

## (undated) DEVICE — TUBE SUCTION POOLE MULTIPURPOSE

## (undated) DEVICE — DRESSING PREVENA 20CM PEEL AND PLACE

## (undated) DEVICE — GLOVE SENSICARE MICRO PF 7.5

## (undated) DEVICE — BOWL 16 OZ GRADUATED STL

## (undated) DEVICE — CANNULA VENOUS SGL STAGE 24F W/RT ANGLE METAL TIP 80MM

## (undated) DEVICE — CABLE EXT FAS LOC SCREW ON 2.0 DISPOSABLE SAFE CONNECT

## (undated) DEVICE — CATH FOLEY SILICONE 5CC 20F 2 WAY

## (undated) DEVICE — SPONGE GAUZE 4X4-12 STL 10'S

## (undated) DEVICE — SUTURE SILK 2 60" TIE

## (undated) DEVICE — SOL SOD CHL INJ .9% 1000ML BAG USP VIAFLEX PLASTIC CONTAINER

## (undated) DEVICE — SUTURE PROLENE 6-0 RB2 30" DBL ARMED MONOFILAMENT

## (undated) DEVICE — Device

## (undated) DEVICE — SOL PLASMA-LYTE A INJ PH 7.4 1000ML USP VIAFLEX PLASTIC BAG

## (undated) DEVICE — CANNULA VESSEL 2MM TIP

## (undated) DEVICE — SUTURE PROLENE 7-0 30 BV-1BLUE DOUBLE ARMED

## (undated) DEVICE — SUTURE VICRYL 0 CT-1

## (undated) DEVICE — IRRIGATION WOUND IRRISEPT WOUND DEBRIDEMENT SYSTEM